# Patient Record
Sex: MALE | Race: WHITE | NOT HISPANIC OR LATINO | Employment: OTHER | URBAN - METROPOLITAN AREA
[De-identification: names, ages, dates, MRNs, and addresses within clinical notes are randomized per-mention and may not be internally consistent; named-entity substitution may affect disease eponyms.]

---

## 2017-03-24 DIAGNOSIS — N23 RENAL COLIC: Primary | ICD-10-CM

## 2017-03-24 NOTE — NURSING NOTE
Pt calling with elevated renal colic. He has an appointment for stone eval in May. Discussed with Dr Andrade. Orders for CT ab/pel w/o contrast ordered. Pt can be worked in next week. PENNIE Esquivel

## 2017-03-27 ENCOUNTER — HOSPITAL ENCOUNTER (OUTPATIENT)
Dept: CT IMAGING | Facility: CLINIC | Age: 62
Discharge: HOME | End: 2017-03-27
Attending: UROLOGY | Admitting: UROLOGY
Payer: COMMERCIAL

## 2017-03-27 DIAGNOSIS — N23 RENAL COLIC: ICD-10-CM

## 2017-03-27 PROCEDURE — 74176 CT ABD & PELVIS W/O CONTRAST: CPT

## 2017-03-28 DIAGNOSIS — N20.1 CALCULUS OF URETER: Primary | ICD-10-CM

## 2017-03-28 RX ORDER — TAMSULOSIN HYDROCHLORIDE 0.4 MG/1
0.4 CAPSULE ORAL DAILY
Qty: 30 CAPSULE | Refills: 1 | Status: SHIPPED | OUTPATIENT
Start: 2017-03-28 | End: 2017-05-30

## 2017-03-30 DIAGNOSIS — N20.0 KIDNEY STONE: Primary | ICD-10-CM

## 2017-03-30 PROCEDURE — 82365 CALCULUS SPECTROSCOPY: CPT | Mod: 90 | Performed by: UROLOGY

## 2017-03-30 PROCEDURE — 99000 SPECIMEN HANDLING OFFICE-LAB: CPT | Performed by: UROLOGY

## 2017-04-02 LAB
APPEARANCE STONE: NORMAL
COMPN STONE: NORMAL
NUMBER STONE: 1
SIZE STONE: NORMAL MM3
WT STONE: 152 MG

## 2017-05-10 DIAGNOSIS — N20.0 KIDNEY STONE: Primary | ICD-10-CM

## 2017-05-12 DIAGNOSIS — N20.0 KIDNEY STONE: Primary | ICD-10-CM

## 2017-05-26 DIAGNOSIS — N20.0 KIDNEY STONE: Primary | ICD-10-CM

## 2017-05-30 ENCOUNTER — HOSPITAL ENCOUNTER (OUTPATIENT)
Dept: GENERAL RADIOLOGY | Facility: CLINIC | Age: 62
Discharge: HOME | End: 2017-05-30
Attending: UROLOGY | Admitting: UROLOGY
Payer: COMMERCIAL

## 2017-05-30 ENCOUNTER — OFFICE VISIT (OUTPATIENT)
Dept: UROLOGY | Facility: CLINIC | Age: 62
End: 2017-05-30
Payer: COMMERCIAL

## 2017-05-30 VITALS
WEIGHT: 221 LBS | HEIGHT: 71 IN | BODY MASS INDEX: 30.94 KG/M2 | DIASTOLIC BLOOD PRESSURE: 90 MMHG | HEART RATE: 70 BPM | SYSTOLIC BLOOD PRESSURE: 160 MMHG

## 2017-05-30 DIAGNOSIS — N20.0 KIDNEY STONE: ICD-10-CM

## 2017-05-30 DIAGNOSIS — N20.0 CALCULUS OF KIDNEY: Primary | ICD-10-CM

## 2017-05-30 PROCEDURE — 99213 OFFICE O/P EST LOW 20 MIN: CPT | Performed by: UROLOGY

## 2017-05-30 PROCEDURE — 74010 XR KUB: CPT | Mod: 52

## 2017-05-30 RX ORDER — POTASSIUM CITRATE 10 MEQ/1
20 TABLET, EXTENDED RELEASE ORAL 2 TIMES DAILY WITH MEALS
Qty: 360 TABLET | Refills: 11 | Status: SHIPPED | OUTPATIENT
Start: 2017-05-30 | End: 2019-09-06

## 2017-05-30 ASSESSMENT — PAIN SCALES - GENERAL: PAINLEVEL: NO PAIN (0)

## 2017-05-30 NOTE — LETTER
"5/30/2017       RE: Magdaleno Marie  [address] [place] [address]     Dear Colleague,    Thank you for referring your patient, Magdaleno Marie, to the [facility] UROLOGY CLINIC [place] at [facility]. Please see a copy of my visit note below.    Office Visit Note  [facility]  [phone]    UROLOGIC DIAGNOSES:   History of stones    CURRENT INTERVENTIONS:       HISTORY:   This is a 61 year old gentleman who underwent a ureteroscopy to remove a stone in 2016. He called in March reporting recurrence of left sided flank pain so a CT scan was ordered, diagnosing him with a 6mm left ureteral stone. We make plans for followup but then 3 days later he passed that stone. This most recent stone consisted of uric acid. Previous stones contained calcium. He is here for KUB today and feels well.      PAST MEDICAL HISTORY:   Past Medical History:   Diagnosis Date     Calculus of kidney        PAST SURGICAL HISTORY:   Past Surgical History:   Procedure Laterality Date     COLONOSCOPY       COMBINED CYSTOSCOPY, RETROGRADES, URETEROSCOPY, LASER HOLMIUM LITHOTRIPSY URETER(S), INSERT STENT Left 7/28/2016    Procedure: COMBINED CYSTOSCOPY, RETROGRADES, URETEROSCOPY, LASER HOLMIUM LITHOTRIPSY URETER(S), INSERT STENT;  Surgeon: Arnel Andrade MD;  Location:  OR       FAMILY HISTORY: No family history on file.    SOCIAL HISTORY:   Social History   Substance Use Topics     Smoking status: Never Smoker     Smokeless tobacco: Never Used     Alcohol use Yes       Current Outpatient Prescriptions   Medication     potassium citrate (UROCIT-K) 10 MEQ (1080 MG) CR tablet     No current facility-administered medications for this visit.      PHYSICAL EXAM:  /90  Pulse 70  Ht 1.803 m (5' 11\")  Wt 100.2 kg (221 lb)  BMI 30.82 kg/m2    HEENT: Normocephalic and atraumatic   Cardiac: Not done  Back/Flank: Not done  CNS/PNS: Not " done  Respiratory: Normal non-labored breathing  Abdomen: Soft nontender and nondistended  Peripheral Vascular: Not done  Mental Status: Not done    Penis: Not done  Scrotal Skin: Not done  Testicles: Not done  Epididymis: Not done  Digital Rectal Exam:     Cystoscopy: Not done    Imaging: KUB today shows small stone in lower pole of right kidney    Urinalysis: UA RESULTS:  Recent Labs   Lab Test  05/28/16   1424   COLOR  Light Yellow   APPEARANCE  Clear   URINEGLC  Negative   URINEBILI  Negative   URINEKETONE  Negative   SG  1.003   UBLD  Moderate*   URINEPH  6.5   PROTEIN  Negative   NITRITE  Negative   LEUKEST  Negative   RBCU  26*   WBCU  <1       PSA:     Post Void Residual:     Other labs: None today      IMPRESSION:  Uric acid stones, right kidney stone    PLAN:  We discussed prevention methods for uric acid stones. I recommended starting KCitrate. This was prescribed for him. I will see him back in 6 months for repeat KUB and urine pH testing    Total Time: 15 min                                      Total in Consultation: 15 min      Arnel Andrade M.D.

## 2017-05-30 NOTE — MR AVS SNAPSHOT
After Visit Summary   5/30/2017    Magdaleno Marie    MRN: [id number]           Patient Information     Date Of Birth          1955        Visit Information        Provider Department      5/30/2017 11:00 AM Arnel Andrade MD [facility]        Today's Diagnoses     Calculus of kidney    -  1       Follow-ups after your visit        Follow-up notes from your care team     Return in about 6 months (around 11/30/2017) for UA, KUB same day.      Your next 10 appointments already scheduled     Dec 05, 2017 10:20 AM CST   XR KUB with SHXR3   [facility] ([facility])    [address] [id number] [phone]           Please bring a list of your current medicines to your exam. (Include vitamins, minerals and over-thecounter medicines.) Leave your valuables at home.  Tell your doctor if there is a chance you may be pregnant.  You do not need to do anything special for this exam.            Dec 05, 2017 11:00 AM CST   Return Visit with Arnel Andrade MD   [facility] ([facility])    [address]  [address]  [place] [id number] [phone]              Future tests that were ordered for you today     Open Future Orders        Priority Expected Expires Ordered    XR KUB Routine 5/30/2017 5/30/2018 5/30/2017            Who to contact     If you have questions or need follow up information about today's clinic visit or your schedule please contact [facility]OLOGY [facility] directly at [phone].  Normal or non-critical lab and imaging results will be communicated to you by MyChart, letter or phone within 4 business days after the clinic has received the results. If you do not hear from us within 7 days, please contact the clinic through MyChart or phone. If you have a critical or abnormal lab result, we  "will notify you by phone as soon as possible.  Submit refill requests through [employer] or call your pharmacy and they will forward the refill request to us. Please allow 3 business days for your refill to be completed.          Additional Information About Your Visit        [employer] Information     [employer] gives you secure access to your electronic health record. If you see a primary care provider, you can also send messages to your care team and make appointments. If you have questions, please call your primary care clinic.  If you do not have a primary care provider, please call [phone] and they will assist you.        Care EveryWhere ID     This is your Care EveryWhere ID. This could be used by other organizations to access your [facility] medical records  [id number]        Your Vitals Were     Pulse Height BMI (Body Mass Index)             70 1.803 m (5' 11\") 30.82 kg/m2          Blood Pressure from Last 3 Encounters:   05/30/17 160/90   07/28/16 (!) 154/94   05/28/16 (!) 183/105    Weight from Last 3 Encounters:   05/30/17 100.2 kg (221 lb)   07/28/16 105.9 kg (233 lb 8 oz)   05/28/16 99.8 kg (220 lb)                 Today's Medication Changes          These changes are accurate as of: 5/30/17 11:36 AM.  If you have any questions, ask your nurse or doctor.               Start taking these medicines.        Dose/Directions    potassium citrate 10 MEQ (1080 MG) CR tablet   Commonly known as:  UROCIT-K   Used for:  Calculus of kidney   Started by:  Arnel Andrade MD        Dose:  20 mEq   Take 2 tablets (20 mEq) by mouth 2 times daily (with meals)   Quantity:  360 tablet   Refills:  11         Stop taking these medicines if you haven't already. Please contact your care team if you have questions.     tamsulosin 0.4 MG capsule   Commonly known as:  FLOMAX   Stopped by:  Arnel Andrade MD                Where to get your medicines      These medications were sent to [id number] - " [place], [place] - [address] AT [facility] OF  [address]  [address], [place] [address]     Phone:  [phone]     potassium citrate 10 MEQ (1080 MG) CR tablet                Primary Care Provider Office Phone # Fax #    [facility] [phone] [address] [address]        Thank you!     Thank you for choosing [facility]OLOG[facility] [place]  for your care. Our goal is always to provide you with excellent care. Hearing back from our patients is one way we can continue to improve our services. Please take a few minutes to complete the written survey that you may receive in the mail after your visit with us. Thank you!             Your Updated Medication List - Protect others around you: Learn how to safely use, store and throw away your medicines at www.disposemymeds.org.          This list is accurate as of: 5/30/17 11:36 AM.  Always use your most recent med list.                   Brand Name Dispense Instructions for use    potassium citrate 10 MEQ (1080 MG) CR tablet    UROCIT-K    360 tablet    Take 2 tablets (20 mEq) by mouth 2 times daily (with meals)

## 2017-05-30 NOTE — PROGRESS NOTES
"Office Visit Note  [facility]  [phone]    UROLOGIC DIAGNOSES:   History of stones    CURRENT INTERVENTIONS:       HISTORY:   This is a 61 year old gentleman who underwent a ureteroscopy to remove a stone in 2016. He called in March reporting recurrence of left sided flank pain so a CT scan was ordered, diagnosing him with a 6mm left ureteral stone. We make plans for followup but then 3 days later he passed that stone. This most recent stone consisted of uric acid. Previous stones contained calcium. He is here for KUB today and feels well.      PAST MEDICAL HISTORY:   Past Medical History:   Diagnosis Date     Calculus of kidney        PAST SURGICAL HISTORY:   Past Surgical History:   Procedure Laterality Date     COLONOSCOPY       COMBINED CYSTOSCOPY, RETROGRADES, URETEROSCOPY, LASER HOLMIUM LITHOTRIPSY URETER(S), INSERT STENT Left 7/28/2016    Procedure: COMBINED CYSTOSCOPY, RETROGRADES, URETEROSCOPY, LASER HOLMIUM LITHOTRIPSY URETER(S), INSERT STENT;  Surgeon: Arnel Andrade MD;  Location:  OR       FAMILY HISTORY: No family history on file.    SOCIAL HISTORY:   Social History   Substance Use Topics     Smoking status: Never Smoker     Smokeless tobacco: Never Used     Alcohol use Yes       Current Outpatient Prescriptions   Medication     potassium citrate (UROCIT-K) 10 MEQ (1080 MG) CR tablet     No current facility-administered medications for this visit.          PHYSICAL EXAM:    /90  Pulse 70  Ht 1.803 m (5' 11\")  Wt 100.2 kg (221 lb)  BMI 30.82 kg/m2    HEENT: Normocephalic and atraumatic   Cardiac: Not done  Back/Flank: Not done  CNS/PNS: Not done  Respiratory: Normal non-labored breathing  Abdomen: Soft nontender and nondistended  Peripheral Vascular: Not done  Mental Status: Not done    Penis: Not done  Scrotal Skin: Not done  Testicles: Not done  Epididymis: Not done  Digital Rectal Exam:     Cystoscopy: Not done    Imaging: KUB today shows small stone in lower " pole of right kidney    Urinalysis: UA RESULTS:  Recent Labs   Lab Test  05/28/16   1424   COLOR  Light Yellow   APPEARANCE  Clear   URINEGLC  Negative   URINEBILI  Negative   URINEKETONE  Negative   SG  1.003   UBLD  Moderate*   URINEPH  6.5   PROTEIN  Negative   NITRITE  Negative   LEUKEST  Negative   RBCU  26*   WBCU  <1       PSA:     Post Void Residual:     Other labs: None today      IMPRESSION:  Uric acid stones, right kidney stone    PLAN:  We discussed prevention methods for uric acid stones. I recommended starting KCitrate. This was prescribed for him. I will see him back in 6 months for repeat KUB and urine pH testing    Total Time: 15 min                                      Total in Consultation: 15 min      Arnel Andrade M.D.

## 2017-05-30 NOTE — NURSING NOTE
Chief Complaint   Patient presents with     Results     KUB results     Mimi Mackey LPN 11:14 AM May 30, 2017

## 2017-12-05 DIAGNOSIS — N20.0 CALCULUS OF KIDNEY: Primary | ICD-10-CM

## 2019-08-26 ENCOUNTER — HOSPITAL ENCOUNTER (OUTPATIENT)
Dept: GENERAL RADIOLOGY | Facility: CLINIC | Age: 64
Discharge: HOME | End: 2019-08-26
Attending: UROLOGY | Admitting: UROLOGY
Payer: COMMERCIAL

## 2019-08-26 ENCOUNTER — TELEPHONE (OUTPATIENT)
Dept: UROLOGY | Facility: CLINIC | Age: 64
End: 2019-08-26

## 2019-08-26 DIAGNOSIS — R10.9 LT FLANK PAIN: Primary | ICD-10-CM

## 2019-08-26 DIAGNOSIS — R10.9 LT FLANK PAIN: ICD-10-CM

## 2019-08-26 PROCEDURE — 74019 RADEX ABDOMEN 2 VIEWS: CPT

## 2019-08-26 NOTE — TELEPHONE ENCOUNTER
Per last office visit. KUB order was placed. Patient will go and have this done. He is planning on traveling out of town in the near future. If there is a stone that is a good size, patient may cancel plans and will schedule follow-up with Md.     Judy Barrow LPN

## 2019-08-26 NOTE — TELEPHONE ENCOUNTER
[facility] Call Center    Phone Message    May a detailed message be left on voicemail: yes    Reason for Call: Other: Patient called has seen Raymond in the past for kidneystones, patient thinks he has them again and would like to see if Dr. Andrade can order a Xray? Please call to inform what he need to do.     Action Taken: Other: p urology

## 2019-08-27 ENCOUNTER — TELEPHONE (OUTPATIENT)
Dept: UROLOGY | Facility: CLINIC | Age: 64
End: 2019-08-27

## 2019-08-27 DIAGNOSIS — N20.0 CALCULUS OF KIDNEY: Primary | ICD-10-CM

## 2019-08-27 NOTE — TELEPHONE ENCOUNTER
Called patient and informed him that he should make an appt. to see MD regarding results. He is aware he has a stone. Patient was transferred to scheduling to make appt.     Judy Barrow LPN

## 2019-08-27 NOTE — TELEPHONE ENCOUNTER
Patient reports to  that he is in pretty severe pain and taking Oxycodone every 6 hours and tamsulosin. Patient wants to have surgery right away to remove stone. Patient's MD is currently out of the office. Patient was instructed if pain is severe to go to ER. Otherwise, if pain is tolerable he can make appt. with MD.     Judy Barrow LPN

## 2019-08-27 NOTE — TELEPHONE ENCOUNTER
[facility] Call Center    Phone Message    May a detailed message be left on voicemail: yes    Reason for Call: Requesting Results   Name/type of test: xray  Date of test: 8/26/19  Was test done at a location other than [facility] (Please fill in the location if not [facility])?: No      Action Taken: Message routed to:  Other: UA Uro

## 2019-09-05 ENCOUNTER — TELEPHONE (OUTPATIENT)
Dept: SURGERY | Facility: CLINIC | Age: 64
End: 2019-09-05

## 2019-09-05 DIAGNOSIS — N20.0 KIDNEY STONE: Primary | ICD-10-CM

## 2019-09-05 NOTE — TELEPHONE ENCOUNTER
Spoke on phone re: KUB results  Pain is on left side but stone is on right  He has had uric acid stones previously so I recommended CT scan tomorrow

## 2019-09-06 ENCOUNTER — HOSPITAL ENCOUNTER (OUTPATIENT)
Dept: CT IMAGING | Facility: CLINIC | Age: 64
Discharge: HOME | End: 2019-09-06
Attending: UROLOGY | Admitting: UROLOGY
Payer: COMMERCIAL

## 2019-09-06 DIAGNOSIS — N20.0 KIDNEY STONE: ICD-10-CM

## 2019-09-06 PROCEDURE — 74176 CT ABD & PELVIS W/O CONTRAST: CPT

## 2019-09-06 RX ORDER — IBUPROFEN 600 MG/1
600 TABLET, FILM COATED ORAL EVERY 6 HOURS PRN
COMMUNITY
End: 2021-09-20

## 2019-09-06 RX ORDER — LISINOPRIL 10 MG/1
10 TABLET ORAL DAILY
COMMUNITY
End: 2023-10-26 | Stop reason: DRUGHIGH

## 2019-09-06 RX ORDER — OXYBUTYNIN CHLORIDE 5 MG/1
5 TABLET ORAL 3 TIMES DAILY PRN
COMMUNITY
End: 2021-09-20

## 2019-09-06 ASSESSMENT — MIFFLIN-ST. JEOR: SCORE: 1818.65

## 2019-09-09 ENCOUNTER — APPOINTMENT (OUTPATIENT)
Dept: GENERAL RADIOLOGY | Facility: CLINIC | Age: 64
End: 2019-09-09
Attending: UROLOGY
Payer: COMMERCIAL

## 2019-09-09 ENCOUNTER — ANESTHESIA (OUTPATIENT)
Dept: SURGERY | Facility: CLINIC | Age: 64
End: 2019-09-09
Payer: COMMERCIAL

## 2019-09-09 ENCOUNTER — ANESTHESIA EVENT (OUTPATIENT)
Dept: SURGERY | Facility: CLINIC | Age: 64
End: 2019-09-09
Payer: COMMERCIAL

## 2019-09-09 ENCOUNTER — HOSPITAL ENCOUNTER (OUTPATIENT)
Facility: CLINIC | Age: 64
Discharge: HOME | End: 2019-09-09
Attending: UROLOGY | Admitting: UROLOGY
Payer: COMMERCIAL

## 2019-09-09 VITALS
SYSTOLIC BLOOD PRESSURE: 160 MMHG | HEART RATE: 70 BPM | OXYGEN SATURATION: 99 % | TEMPERATURE: 98.6 F | DIASTOLIC BLOOD PRESSURE: 89 MMHG | RESPIRATION RATE: 16 BRPM | WEIGHT: 221 LBS | BODY MASS INDEX: 30.94 KG/M2 | HEIGHT: 71 IN

## 2019-09-09 DIAGNOSIS — N20.0 CALCULUS OF KIDNEY: Primary | ICD-10-CM

## 2019-09-09 DIAGNOSIS — N20.0 KIDNEY STONE: Primary | ICD-10-CM

## 2019-09-09 LAB
COPATH REPORT: NORMAL
CREAT SERPL-MCNC: 1.65 MG/DL (ref 0.66–1.25)
GFR SERPL CREATININE-BSD FRML MDRD: 43 ML/MIN/{1.73_M2}
INTERPRETATION ECG - MUSE: NORMAL
POTASSIUM SERPL-SCNC: 4.1 MMOL/L (ref 3.4–5.3)

## 2019-09-09 PROCEDURE — 93010 ELECTROCARDIOGRAM REPORT: CPT | Performed by: INTERNAL MEDICINE

## 2019-09-09 PROCEDURE — 25800030 ZZH RX IP 258 OP 636: Performed by: ANESTHESIOLOGY

## 2019-09-09 PROCEDURE — 36000058 ZZH SURGERY LEVEL 3 EA 15 ADDTL MIN: Performed by: UROLOGY

## 2019-09-09 PROCEDURE — 52356 CYSTO/URETERO W/LITHOTRIPSY: CPT | Mod: 50 | Performed by: UROLOGY

## 2019-09-09 PROCEDURE — 37000008 ZZH ANESTHESIA TECHNICAL FEE, 1ST 30 MIN: Performed by: UROLOGY

## 2019-09-09 PROCEDURE — 88300 SURGICAL PATH GROSS: CPT | Mod: 26 | Performed by: UROLOGY

## 2019-09-09 PROCEDURE — 36000060 ZZH SURGERY LEVEL 3 W FLUORO 1ST 30 MIN: Performed by: UROLOGY

## 2019-09-09 PROCEDURE — 37000009 ZZH ANESTHESIA TECHNICAL FEE, EACH ADDTL 15 MIN: Performed by: UROLOGY

## 2019-09-09 PROCEDURE — 40000306 ZZH STATISTIC PRE PROC ASSESS II: Performed by: UROLOGY

## 2019-09-09 PROCEDURE — C2617 STENT, NON-COR, TEM W/O DEL: HCPCS | Performed by: UROLOGY

## 2019-09-09 PROCEDURE — 25000125 ZZHC RX 250: Performed by: ANESTHESIOLOGY

## 2019-09-09 PROCEDURE — 25000132 ZZH RX MED GY IP 250 OP 250 PS 637: Performed by: UROLOGY

## 2019-09-09 PROCEDURE — 88300 SURGICAL PATH GROSS: CPT | Performed by: UROLOGY

## 2019-09-09 PROCEDURE — 71000013 ZZH RECOVERY PHASE 1 LEVEL 1 EA ADDTL HR: Performed by: UROLOGY

## 2019-09-09 PROCEDURE — 71000027 ZZH RECOVERY PHASE 2 EACH 15 MINS: Performed by: UROLOGY

## 2019-09-09 PROCEDURE — 71000012 ZZH RECOVERY PHASE 1 LEVEL 1 FIRST HR: Performed by: UROLOGY

## 2019-09-09 PROCEDURE — 25000128 H RX IP 250 OP 636: Performed by: UROLOGY

## 2019-09-09 PROCEDURE — 25000128 H RX IP 250 OP 636: Performed by: NURSE ANESTHETIST, CERTIFIED REGISTERED

## 2019-09-09 PROCEDURE — 82565 ASSAY OF CREATININE: CPT | Performed by: ANESTHESIOLOGY

## 2019-09-09 PROCEDURE — 27211024 ZZHC OR SUPPLY OTHER OPNP: Performed by: UROLOGY

## 2019-09-09 PROCEDURE — 74420 UROGRAPHY RTRGR +-KUB: CPT | Mod: 26 | Performed by: UROLOGY

## 2019-09-09 PROCEDURE — 25500064 ZZH RX 255 OP 636: Performed by: UROLOGY

## 2019-09-09 PROCEDURE — 25000125 ZZHC RX 250: Performed by: NURSE ANESTHETIST, CERTIFIED REGISTERED

## 2019-09-09 PROCEDURE — 36415 COLL VENOUS BLD VENIPUNCTURE: CPT | Performed by: ANESTHESIOLOGY

## 2019-09-09 PROCEDURE — 40000277 XR SURGERY CARM FLUORO LESS THAN 5 MIN W STILLS: Mod: TC

## 2019-09-09 PROCEDURE — 25000125 ZZHC RX 250: Performed by: UROLOGY

## 2019-09-09 PROCEDURE — 25000132 ZZH RX MED GY IP 250 OP 250 PS 637: Performed by: ANESTHESIOLOGY

## 2019-09-09 PROCEDURE — 84132 ASSAY OF SERUM POTASSIUM: CPT | Performed by: ANESTHESIOLOGY

## 2019-09-09 PROCEDURE — C1894 INTRO/SHEATH, NON-LASER: HCPCS | Performed by: UROLOGY

## 2019-09-09 PROCEDURE — 82365 CALCULUS SPECTROSCOPY: CPT | Performed by: UROLOGY

## 2019-09-09 PROCEDURE — C1769 GUIDE WIRE: HCPCS | Performed by: UROLOGY

## 2019-09-09 PROCEDURE — 25000128 H RX IP 250 OP 636: Performed by: ANESTHESIOLOGY

## 2019-09-09 PROCEDURE — 27210794 ZZH OR GENERAL SUPPLY STERILE: Performed by: UROLOGY

## 2019-09-09 DEVICE — STENT URETERAL POLARIS ULTRA 5FRX26CM M0061921230: Type: IMPLANTABLE DEVICE | Site: URETER | Status: FUNCTIONAL

## 2019-09-09 RX ORDER — DEXAMETHASONE SODIUM PHOSPHATE 4 MG/ML
INJECTION, SOLUTION INTRA-ARTICULAR; INTRALESIONAL; INTRAMUSCULAR; INTRAVENOUS; SOFT TISSUE PRN
Status: DISCONTINUED | OUTPATIENT
Start: 2019-09-09 | End: 2019-09-09

## 2019-09-09 RX ORDER — ACETAMINOPHEN 325 MG/1
975 TABLET ORAL ONCE
Status: COMPLETED | OUTPATIENT
Start: 2019-09-09 | End: 2019-09-09

## 2019-09-09 RX ORDER — PROPOFOL 10 MG/ML
INJECTION, EMULSION INTRAVENOUS PRN
Status: DISCONTINUED | OUTPATIENT
Start: 2019-09-09 | End: 2019-09-09

## 2019-09-09 RX ORDER — CEFAZOLIN SODIUM 1 G/3ML
1 INJECTION, POWDER, FOR SOLUTION INTRAMUSCULAR; INTRAVENOUS SEE ADMIN INSTRUCTIONS
Status: DISCONTINUED | OUTPATIENT
Start: 2019-09-09 | End: 2019-09-09 | Stop reason: HOSPADM

## 2019-09-09 RX ORDER — PHENYLEPHRINE HYDROCHLORIDE 10 MG/ML
INJECTION INTRAVENOUS PRN
Status: DISCONTINUED | OUTPATIENT
Start: 2019-09-09 | End: 2019-09-09

## 2019-09-09 RX ORDER — ATROPA BELLADONNA AND OPIUM 16.2; 3 MG/1; MG/1
SUPPOSITORY RECTAL PRN
Status: DISCONTINUED | OUTPATIENT
Start: 2019-09-09 | End: 2019-09-09 | Stop reason: HOSPADM

## 2019-09-09 RX ORDER — GLYCOPYRROLATE 0.2 MG/ML
INJECTION, SOLUTION INTRAMUSCULAR; INTRAVENOUS PRN
Status: DISCONTINUED | OUTPATIENT
Start: 2019-09-09 | End: 2019-09-09

## 2019-09-09 RX ORDER — FENTANYL CITRATE 50 UG/ML
INJECTION, SOLUTION INTRAMUSCULAR; INTRAVENOUS PRN
Status: DISCONTINUED | OUTPATIENT
Start: 2019-09-09 | End: 2019-09-09

## 2019-09-09 RX ORDER — NALOXONE HYDROCHLORIDE 0.4 MG/ML
.1-.4 INJECTION, SOLUTION INTRAMUSCULAR; INTRAVENOUS; SUBCUTANEOUS
Status: DISCONTINUED | OUTPATIENT
Start: 2019-09-09 | End: 2019-09-09 | Stop reason: HOSPADM

## 2019-09-09 RX ORDER — FENTANYL CITRATE 50 UG/ML
25-50 INJECTION, SOLUTION INTRAMUSCULAR; INTRAVENOUS EVERY 5 MIN PRN
Status: DISCONTINUED | OUTPATIENT
Start: 2019-09-09 | End: 2019-09-09 | Stop reason: HOSPADM

## 2019-09-09 RX ORDER — SODIUM CHLORIDE, SODIUM LACTATE, POTASSIUM CHLORIDE, CALCIUM CHLORIDE 600; 310; 30; 20 MG/100ML; MG/100ML; MG/100ML; MG/100ML
INJECTION, SOLUTION INTRAVENOUS CONTINUOUS
Status: DISCONTINUED | OUTPATIENT
Start: 2019-09-09 | End: 2019-09-09 | Stop reason: HOSPADM

## 2019-09-09 RX ORDER — HYDROMORPHONE HYDROCHLORIDE 1 MG/ML
.3-.5 INJECTION, SOLUTION INTRAMUSCULAR; INTRAVENOUS; SUBCUTANEOUS EVERY 10 MIN PRN
Status: DISCONTINUED | OUTPATIENT
Start: 2019-09-09 | End: 2019-09-09 | Stop reason: HOSPADM

## 2019-09-09 RX ORDER — ONDANSETRON 4 MG/1
4 TABLET, ORALLY DISINTEGRATING ORAL EVERY 30 MIN PRN
Status: DISCONTINUED | OUTPATIENT
Start: 2019-09-09 | End: 2019-09-09 | Stop reason: HOSPADM

## 2019-09-09 RX ORDER — ONDANSETRON 2 MG/ML
INJECTION INTRAMUSCULAR; INTRAVENOUS PRN
Status: DISCONTINUED | OUTPATIENT
Start: 2019-09-09 | End: 2019-09-09

## 2019-09-09 RX ORDER — KETOROLAC TROMETHAMINE 10 MG/1
10 TABLET, FILM COATED ORAL EVERY 6 HOURS PRN
Qty: 10 TABLET | Refills: 1 | Status: SHIPPED | OUTPATIENT
Start: 2019-09-09 | End: 2021-09-20

## 2019-09-09 RX ORDER — FENTANYL CITRATE 50 UG/ML
25-50 INJECTION, SOLUTION INTRAMUSCULAR; INTRAVENOUS
Status: DISCONTINUED | OUTPATIENT
Start: 2019-09-09 | End: 2019-09-09 | Stop reason: HOSPADM

## 2019-09-09 RX ORDER — KETOROLAC TROMETHAMINE 30 MG/ML
30 INJECTION, SOLUTION INTRAMUSCULAR; INTRAVENOUS ONCE
Status: COMPLETED | OUTPATIENT
Start: 2019-09-09 | End: 2019-09-09

## 2019-09-09 RX ORDER — HYDROCODONE BITARTRATE AND ACETAMINOPHEN 5; 325 MG/1; MG/1
1-2 TABLET ORAL EVERY 4 HOURS PRN
Status: DISCONTINUED | OUTPATIENT
Start: 2019-09-09 | End: 2019-09-09 | Stop reason: HOSPADM

## 2019-09-09 RX ORDER — OXYBUTYNIN CHLORIDE 5 MG/1
5 TABLET ORAL 3 TIMES DAILY PRN
Qty: 10 TABLET | Refills: 1 | Status: SHIPPED | OUTPATIENT
Start: 2019-09-09 | End: 2020-06-11

## 2019-09-09 RX ORDER — HYDROCODONE BITARTRATE AND ACETAMINOPHEN 5; 325 MG/1; MG/1
1-2 TABLET ORAL EVERY 4 HOURS PRN
Qty: 5 TABLET | Refills: 0 | Status: SHIPPED | OUTPATIENT
Start: 2019-09-09 | End: 2021-09-20

## 2019-09-09 RX ORDER — LIDOCAINE 40 MG/G
CREAM TOPICAL
Status: DISCONTINUED | OUTPATIENT
Start: 2019-09-09 | End: 2019-09-09 | Stop reason: HOSPADM

## 2019-09-09 RX ORDER — CEFAZOLIN SODIUM 2 G/100ML
2 INJECTION, SOLUTION INTRAVENOUS
Status: COMPLETED | OUTPATIENT
Start: 2019-09-09 | End: 2019-09-09

## 2019-09-09 RX ORDER — MEPERIDINE HYDROCHLORIDE 50 MG/ML
12.5 INJECTION INTRAMUSCULAR; INTRAVENOUS; SUBCUTANEOUS
Status: DISCONTINUED | OUTPATIENT
Start: 2019-09-09 | End: 2019-09-09 | Stop reason: HOSPADM

## 2019-09-09 RX ORDER — ONDANSETRON 2 MG/ML
4 INJECTION INTRAMUSCULAR; INTRAVENOUS EVERY 30 MIN PRN
Status: DISCONTINUED | OUTPATIENT
Start: 2019-09-09 | End: 2019-09-09 | Stop reason: HOSPADM

## 2019-09-09 RX ADMIN — ACETAMINOPHEN 975 MG: 325 TABLET, FILM COATED ORAL at 10:54

## 2019-09-09 RX ADMIN — PROPOFOL 200 MG: 10 INJECTION, EMULSION INTRAVENOUS at 12:05

## 2019-09-09 RX ADMIN — FENTANYL CITRATE 50 MCG: 50 INJECTION INTRAMUSCULAR; INTRAVENOUS at 14:23

## 2019-09-09 RX ADMIN — FENTANYL CITRATE 50 MCG: 50 INJECTION INTRAMUSCULAR; INTRAVENOUS at 14:09

## 2019-09-09 RX ADMIN — PHENYLEPHRINE HYDROCHLORIDE 100 MCG: 10 INJECTION INTRAVENOUS at 12:56

## 2019-09-09 RX ADMIN — FENTANYL CITRATE 50 MCG: 50 INJECTION INTRAMUSCULAR; INTRAVENOUS at 13:55

## 2019-09-09 RX ADMIN — FENTANYL CITRATE 100 MCG: 50 INJECTION, SOLUTION INTRAMUSCULAR; INTRAVENOUS at 12:22

## 2019-09-09 RX ADMIN — HYDROCODONE BITARTRATE AND ACETAMINOPHEN 1 TABLET: 5; 325 TABLET ORAL at 14:38

## 2019-09-09 RX ADMIN — LIDOCAINE HYDROCHLORIDE 50 MG: 10 INJECTION, SOLUTION EPIDURAL; INFILTRATION; INTRACAUDAL; PERINEURAL at 12:05

## 2019-09-09 RX ADMIN — MIDAZOLAM 2 MG: 1 INJECTION INTRAMUSCULAR; INTRAVENOUS at 12:00

## 2019-09-09 RX ADMIN — PHENYLEPHRINE HYDROCHLORIDE 100 MCG: 10 INJECTION INTRAVENOUS at 13:13

## 2019-09-09 RX ADMIN — SODIUM CHLORIDE, POTASSIUM CHLORIDE, SODIUM LACTATE AND CALCIUM CHLORIDE: 600; 310; 30; 20 INJECTION, SOLUTION INTRAVENOUS at 14:04

## 2019-09-09 RX ADMIN — ONDANSETRON HYDROCHLORIDE 4 MG: 2 INJECTION, SOLUTION INTRAVENOUS at 12:21

## 2019-09-09 RX ADMIN — PHENYLEPHRINE HYDROCHLORIDE 150 MCG: 10 INJECTION INTRAVENOUS at 12:18

## 2019-09-09 RX ADMIN — FENTANYL CITRATE 50 MCG: 50 INJECTION, SOLUTION INTRAMUSCULAR; INTRAVENOUS at 12:49

## 2019-09-09 RX ADMIN — CEFAZOLIN SODIUM 2 G: 2 INJECTION, SOLUTION INTRAVENOUS at 12:00

## 2019-09-09 RX ADMIN — DEXAMETHASONE SODIUM PHOSPHATE 4 MG: 4 INJECTION, SOLUTION INTRA-ARTICULAR; INTRALESIONAL; INTRAMUSCULAR; INTRAVENOUS; SOFT TISSUE at 12:06

## 2019-09-09 RX ADMIN — FENTANYL CITRATE 50 MCG: 50 INJECTION INTRAMUSCULAR; INTRAVENOUS at 14:01

## 2019-09-09 RX ADMIN — SODIUM CHLORIDE, POTASSIUM CHLORIDE, SODIUM LACTATE AND CALCIUM CHLORIDE: 600; 310; 30; 20 INJECTION, SOLUTION INTRAVENOUS at 11:01

## 2019-09-09 RX ADMIN — KETOROLAC TROMETHAMINE 30 MG: 30 INJECTION, SOLUTION INTRAMUSCULAR at 16:50

## 2019-09-09 RX ADMIN — FENTANYL CITRATE 100 MCG: 50 INJECTION, SOLUTION INTRAMUSCULAR; INTRAVENOUS at 12:05

## 2019-09-09 RX ADMIN — HYDROMORPHONE HYDROCHLORIDE 0.5 MG: 10 INJECTION, SOLUTION INTRAMUSCULAR; INTRAVENOUS; SUBCUTANEOUS at 14:33

## 2019-09-09 RX ADMIN — GLYCOPYRROLATE 0.1 MG: 0.2 INJECTION, SOLUTION INTRAMUSCULAR; INTRAVENOUS at 12:50

## 2019-09-09 RX ADMIN — FENTANYL CITRATE 50 MCG: 50 INJECTION, SOLUTION INTRAMUSCULAR; INTRAVENOUS at 15:15

## 2019-09-09 RX ADMIN — HYDROMORPHONE HYDROCHLORIDE 0.5 MG: 10 INJECTION, SOLUTION INTRAMUSCULAR; INTRAVENOUS; SUBCUTANEOUS at 14:12

## 2019-09-09 ASSESSMENT — ENCOUNTER SYMPTOMS
SEIZURES: 0
DYSRHYTHMIAS: 0

## 2019-09-09 ASSESSMENT — MIFFLIN-ST. JEOR: SCORE: 1809.32

## 2019-09-09 NOTE — DISCHARGE INSTRUCTIONS
CYSTOSCOPY DISCHARGE INSTRUCTIONS  [facility] UROLOGY  [address]  [phone]    YOU MAY GO BACK TO YOUR NORMAL DIET AND ACTIVITY, UNLESS YOUR DOCTOR TELLS YOU NOT TO.    FOR THE NEXT TWO DAYS, YOU MAY NOTICE:    SOME BLOOD IN YOUR URINE.  SOME BURNING WHEN YOU URINATE.  AN URGE TO URINATE MORE OFTEN.  BLADDER SPASMS.    THESE ARE NORMAL AFTER THE PROCEDURE.  THEY SHOULD GO AWAY AFTER A DAY OR TWO.  TO RELIEVE THESE PROBLEMS:     DRINK 6 TO 8 LARGE GLASSES OF WATER EACH DAY (INCLUDES DRINKS AT MEALS).  THIS WILL HELP CLEAR THE URINE.    TAKE WARM BATHS TO RELIEVE PAIN AND BLADDER SPASMS.  DO NOT ADD ANYTHING TO THE BATH WATER.    YOUR DOCTOR MAY PRESCRIBE PAIN MEDICINE.  YOU MAY ALSO TAKE TYLENOL (ACETAMINOPHEN) FOR PAIN.    CALL YOUR SURGEON IF YOU HAVE:    A FEVER OVER 101 DEGREES.  CHECK YOUR TEMPERATURE UNDER YOUR TONGUE.    CHILLS.    FAILURE TO URINATE (NO URINE COMES OUT WHEN YOU TRY TO USE THE TOILET).  TRY SOAKING IN A BATHTUB FULL OF WARM WATER.  IF STILL NO URINE, CALL YOUR DOCTOR.    A LOT OF BLOOD IN THE URINE, OR BLOOD CLOTS LARGER THAN A NICKEL.      PAIN IN THE BACK OR BELLY AREA (ABDOMEN).    PAIN OR SPASMS THAT ARE NOT RELIEVED BY WARM TUB BATHS AND PAIN MEDICINE.      SEVERE PAIN, BURNING OR OTHER PROBLEMS WHILE PASSING URINE.    PAIN THAT GETS WORSE AFTER TWO DAYS.                                                                   STENT INFORMATION/DISCHARGE INSTRUCTIONS   [facility]  [address]  [phone]    During surgery, a stent may be placed in the ureter.  The ureter is the tube that drains urine from the kidney to the bladder.  The stent is placed to dilate (open) the ureter so stone fragments can pass easily through the ureter or to decrease ureteral swelling after surgery or to relieve an obstruction.      The stent is made of silicone.  The upper end of the stent curls in the kidney while the lower end rests in the  bladder.    While the stent is in place you may experience the following symptoms:  Blood and/or small blood clots in the urine  Bladder spasms (frequency and urgency of urination)  Discomfort or aching in the back or side where the stent is  Burning or discomfort at the end of urine stream    To decrease these symptoms you should:  Take antispasmodic medication as prescribed (Detrol, Ditropan, etc.)  Drink plenty of fluids but avoid caffeine and citrus (include cranberry)  If you are having discomfort in back or side, decrease activity    Please call your physician or the physician on call if you experience:  Fever greater than 101 degrees  Severe pain not relieved by pain medication or rest    Please make an appointment for the removal of the stent according to your physician's instructions.               GENERAL ANESTHESIA OR SEDATION ADULT DISCHARGE INSTRUCTIONS   SPECIAL PRECAUTIONS FOR 24 HOURS AFTER SURGERY    IT IS NOT UNUSUAL TO FEEL LIGHT-HEADED OR FAINT, UP TO 24 HOURS AFTER SURGERY OR WHILE TAKING PAIN MEDICATION.  IF YOU HAVE THESE SYMPTOMS; SIT FOR A FEW MINUTES BEFORE STANDING AND HAVE SOMEONE ASSIST YOU WHEN YOU GET UP TO WALK OR USE THE BATHROOM.    YOU SHOULD REST AND RELAX FOR THE NEXT 24 HOURS AND YOU MUST MAKE ARRANGEMENTS TO HAVE SOMEONE STAY WITH YOU FOR AT LEAST 24 HOURS AFTER YOUR DISCHARGE.  AVOID HAZARDOUS AND STRENUOUS ACTIVITIES.  DO NOT MAKE IMPORTANT DECISIONS FOR 24 HOURS.    DO NOT DRIVE ANY VEHICLE OR OPERATE MECHANICAL EQUIPMENT FOR 24 HOURS FOLLOWING THE END OF YOUR SURGERY.  EVEN THOUGH YOU MAY FEEL NORMAL, YOUR REACTIONS MAY BE AFFECTED BY THE MEDICATION YOU HAVE RECEIVED.    DO NOT DRINK ALCOHOLIC BEVERAGES FOR 24 HOURS FOLLOWING YOUR SURGERY.    DRINK CLEAR LIQUIDS (APPLE JUICE, GINGER ALE, 7-UP, BROTH, ETC.).  PROGRESS TO YOUR REGULAR DIET AS YOU FEEL ABLE.    YOU MAY HAVE A DRY MOUTH, A SORE THROAT, MUSCLES ACHES OR TROUBLE SLEEPING.  THESE SHOULD GO AWAY AFTER 24  HOURS.    CALL YOUR DOCTOR FOR ANY OF THE FOLLOWING:  SIGNS OF INFECTION (FEVER, GROWING TENDERNESS AT THE SURGERY SITE, A LARGE AMOUNT OF DRAINAGE OR BLEEDING, SEVERE PAIN, FOUL-SMELLING DRAINAGE, REDNESS OR SWELLING.    IT HAS BEEN OVER 8 TO 10 HOURS SINCE SURGERY AND YOU ARE STILL NOT ABLE TO URINATE (PASS WATER).

## 2019-09-09 NOTE — ANESTHESIA POSTPROCEDURE EVALUATION
Patient: Magdaleno Tellezter    Procedure(s):  CYSTOURETEROSCOPY, WITH Bilateral RETROGRADE PYELOGRAM, HOLMIUM LASER LITHOTRIPSY OF URETERAL CALCULUS, AND STENT INSERTION    Diagnosis:stone  Diagnosis Additional Information: No value filed.    Anesthesia Type:  General, LMA    Note:  Anesthesia Post Evaluation    Patient location during evaluation: PACU  Patient participation: Able to fully participate in evaluation  Level of consciousness: awake and alert  Pain management: adequate  Airway patency: patent  Cardiovascular status: acceptable  Respiratory status: acceptable  Hydration status: acceptable  PONV: controlled             Last vitals:  Vitals:    09/09/19 1330 09/09/19 1335 09/09/19 1340   BP: 130/76 105/74 115/80   Pulse: 64 73 67   Resp: 18 9 12   Temp: 97  F (36.1  C)     SpO2: 99% 96% 95%         Electronically Signed By: Frantz Barrow MD  September 9, 2019  1:59 PM

## 2019-09-09 NOTE — OR NURSING
Pt complaining of pain in lower R groin. States pain is 6-7/10.  Feels pulsating pain where he feels the stent is. Has already voided post surgery without complications, does have blood tinged urine.  Updated Dr nAdrade about intensity of pain and location. Ordered toradol and oxybutin to take home also. Given toradol IV. Will continue to monitor pain.

## 2019-09-09 NOTE — PROGRESS NOTES
Patient referred for Healing Touch due to pain. Pain 7/10 prior to session.  Healing Touch explained to the patient and patient consented to Healing Touch session.  Healing Touch performed by Irene. Pain post session 5.75/10.

## 2019-09-09 NOTE — ANESTHESIA CARE TRANSFER NOTE
Patient: Magdaleno Marie    Procedure(s):  CYSTOURETEROSCOPY, WITH Bilateral RETROGRADE PYELOGRAM, HOLMIUM LASER LITHOTRIPSY OF URETERAL CALCULUS, AND STENT INSERTION    Diagnosis: stone  Diagnosis Additional Information: No value filed.    Anesthesia Type:   General, LMA     Note:  Airway :Face Mask  Patient transferred to:PACU  Comments:   Spontaneous respirations, oral suctioned, bilateral eye opening and hand grasps.  Extubated to FM O2 6lpm.  VSS to PACU.Handoff Report: Identifed the Patient, Identified the Reponsible Provider, Reviewed the pertinent medical history, Discussed the surgical course, Reviewed Intra-OP anesthesia mangement and issues during anesthesia, Set expectations for post-procedure period and Allowed opportunity for questions and acknowledgement of understanding      Vitals: (Last set prior to Anesthesia Care Transfer)    CRNA VITALS  9/9/2019 1258 - 9/9/2019 1335      9/9/2019             Resp Rate (observed):  20                Electronically Signed By: ДМИТРИЙ Glover CRNA  September 9, 2019  1:35 PM

## 2019-09-09 NOTE — OP NOTE
Procedure Date: 09/09/2019      SURGEON:  Arnel Andrade MD.      PREOPERATIVE DIAGNOSIS:  Bilateral ureteral stones.      POSTOPERATIVE DIAGNOSIS:  Bilateral ureteral stones.      PROCEDURE PERFORMED:  Cystoscopy, bilateral retrograde pyelograms, interpretation of fluoroscopic images, bilateral ureteroscopy with holmium laser lithotripsy and stone basketing, placement of 5 x 26 double-J ureteral stents bilaterally.      ANESTHESIA:  General endotracheal.      COMPLICATIONS:  None.      INDICATIONS FOR PROCEDURE:  Magdaleno Marie is a 63-year-old gentleman with bilateral UPJ stones who now presents for removal.      DETAILS OF THE PROCEDURE:  The risks and benefits of the procedure were explained in detail with the patient and informed consent was obtained.  The patient was brought to the operating room, placed supine on the table, where he underwent general endotracheal anesthetic.  He was then moved down to the dorsal lithotomy position where he was prepped and draped in standard sterile fashion.        We began the procedure by introducing the 22-Irish cystoscope through the urethra into the bladder to perform cystoscopy.  There were no urothelial abnormalities identified.  I cannulated the left ureteral catheter and performed retrograde pyelogram.  There was hydronephrosis in the proximal left ureter as well as the left kidney.  I passed a Glidewire in the left kidney and backed off the ureteral catheter along with the scope.  Alongside the Glidewire, I passed the rigid ureteroscope through the urethra, into the bladder and up the left ureter.  The stone was seen in the very proximal left ureter.  I used the 200 micron holmium laser fiber to break up the stone into multiple fragments.  These fragments were basketed out and placed in the bladder.  I then performed ureteroscopy all the way up to the left kidney and no stones remained in the ureter.  I passed a second Glidewire into the left kidney and backed off  the rigid ureteroscope.  I then passed the Olympus flexible ureteroscope over the Glidewire into the left kidney under fluoroscopic guidance.  I performed retrograde pyelogram.  I then performed a complete renoscopy under fluoroscopic guidance as well to make certain I looked in the entire kidney and there were no stones remaining in the left kidney.  I removed the ureteroscope.  I reloaded the cystoscope over the Glidewire until the left ureteral orifice was visualized.  I passed a 5 x 26 double-J ureteral stent over the wire.  The wire was pulled back and a good curl could be seen in the renal pelvis under fluoroscopy.  A good curl was seen in the bladder under direct visualization.  The bladder was drained and I sent all of the stones separately as left kidney stones.        I then cannulated the right ureteral orifice with ureteral catheter and performed a retrograde pyelogram.  The retrograde pyelogram was completely normal.  I passed a Glidewire into the right kidney and backed off the ureteral catheter along with the scope.  Alongside the Glidewire, I passed the rigid ureteroscope through the urethra, into the bladder and up the right ureter.  The right ureter was free of any stones.  I passed a second Glidewire into the right kidney and backed off the rigid scope.  I then attempted to pass the Olympus flexible digital ureteroscope over the Glidewire, but could not get past the right UPJ.  I then backloaded off the scope and then passed the Storz fiberoptics scope up into the right kidney.  I performed a retrograde pyelogram and performed a complete renoscopy.  The stone was then pushed back up into the upper pole of the right kidney.  I used a 200 micron holmium laser fiber on the dusting setting to dust the stone down to very small pieces and then I basketed out this final piece and sent it as a separate specimen labeled right kidney stone.  I reloaded the cystoscope over the Glidewire until the right  ureteral orifice was visualized.  I passed a 5 x 26 double-J ureteral stent over the wire.  The wire was pulled back and a good curl could be seen in the renal pelvis under fluoroscopy.  A good curl was seen in the bladder under direct visualization.  The bladder was drained, the scope was removed.  I placed a B and O suppository at the end the case.      The patient tolerated the procedure well without complications.  He went to the post-anesthetic care in good condition.  He will go home from there.  I will see him back in 1 week for a stent out appointment and he will have a KUB beforehand as well.         LOKESH PAGAN MD             D: 2019   T: 2019   MT: ANASTASIA      Name:     DANNIE JONES   MRN:      [id number]        Account:        [id number]   :      1955           Procedure Date: 2019      Document: [id number]

## 2019-09-09 NOTE — ANESTHESIA PREPROCEDURE EVALUATION
Anesthesia Pre-Procedure Evaluation    Patient: Magdaleno Marie   MRN: [id number] : 1955          Preoperative Diagnosis: stone    Procedure(s):  CYSTOURETEROSCOPY, WITH RETROGRADE PYELOGRAM, HOLMIUM LASER LITHOTRIPSY OF URETERAL CALCULUS, AND STENT INSERTION    Past Medical History:   Diagnosis Date     Calculus of kidney     kidney stones     Hypertension      Past Surgical History:   Procedure Laterality Date     COLONOSCOPY       COMBINED CYSTOSCOPY, RETROGRADES, URETEROSCOPY, LASER HOLMIUM LITHOTRIPSY URETER(S), INSERT STENT Left 2016    Procedure: COMBINED CYSTOSCOPY, RETROGRADES, URETEROSCOPY, LASER HOLMIUM LITHOTRIPSY URETER(S), INSERT STENT;  Surgeon: Arnel Andrade MD;  Location:  OR     GENITOURINARY SURGERY      shock wave lithotripsy     Anesthesia Evaluation     .             ROS/MED HX    ENT/Pulmonary:  - neg pulmonary ROS     Neurologic:  - neg neurologic ROS    (-) seizures and Neuropathy   Cardiovascular:     (+) hypertension----. : . . . :. .      (-) CAD, syncope and arrhythmias   METS/Exercise Tolerance:  >4 METS   Hematologic:  - neg hematologic  ROS       Musculoskeletal:  - neg musculoskeletal ROS       GI/Hepatic:  - neg GI/hepatic ROS      (-) GERD   Renal/Genitourinary:     (+) Nephrolithiasis ,       Endo:  - neg endo ROS       Psychiatric:  - neg psychiatric ROS       Infectious Disease:  - neg infectious disease ROS       Malignancy:      - no malignancy   Other:                          Physical Exam  Normal systems: cardiovascular, pulmonary and dental    Airway   Mallampati: II  TM distance: >3 FB  Neck ROM: full    Dental     Cardiovascular       Pulmonary             Lab Results   Component Value Date    WBC 8.6 2016    HGB 16.3 2016    HCT 46.3 2016     (L) 2016     2016    POTASSIUM 3.8 2016    CHLORIDE 104 2016    CO2 25 2016    BUN 22 2016    CR 0.97 2016    GLC 99  "05/28/2016    CHRISTOPH 9.0 05/28/2016       Preop Vitals  BP Readings from Last 3 Encounters:   05/30/17 160/90   07/28/16 (!) 154/94   05/28/16 (!) 183/105    Pulse Readings from Last 3 Encounters:   05/30/17 70   05/28/16 64      Resp Readings from Last 3 Encounters:   07/28/16 16   05/28/16 18    SpO2 Readings from Last 3 Encounters:   07/28/16 96%   05/28/16 98%      Temp Readings from Last 1 Encounters:   07/28/16 97.8  F (36.6  C) (Temporal)    Ht Readings from Last 1 Encounters:   09/09/19 1.803 m (5' 10.98\")      Wt Readings from Last 1 Encounters:   09/09/19 100.2 kg (221 lb)    Estimated body mass index is 30.84 kg/m  as calculated from the following:    Height as of this encounter: 1.803 m (5' 10.98\").    Weight as of this encounter: 100.2 kg (221 lb).       Anesthesia Plan      History & Physical Review  History and physical reviewed and following examination; no interval change.    ASA Status:  2 .    NPO Status:  > 8 hours    Plan for General and LMA with Intravenous induction. Maintenance will be Inhalation.    PONV prophylaxis:  Ondansetron (or other 5HT-3) and Dexamethasone or Solumedrol       Postoperative Care  Postoperative pain management:  IV analgesics, Multi-modal analgesia and Oral pain medications.      Consents  Anesthetic plan, risks, benefits and alternatives discussed with:  Patient..                 Frantz Barrow MD                    .  "

## 2019-09-10 ENCOUNTER — TELEPHONE (OUTPATIENT)
Dept: UROLOGY | Facility: CLINIC | Age: 64
End: 2019-09-10

## 2019-09-10 NOTE — TELEPHONE ENCOUNTER
"Urology pt of Dr. Andrade s/p Cystoscopy with bilateral ureteroscopy with holmium laser lithotripsy and stone basketing, and placement of double-J ureteral stents bilaterally yesterday 9/9/2019.   Marcoscornelio calls today reporting he's doing well. He describes his pain now as \"very little\" and urine is clearing. He was given both Norco and Toradol for pain. He has 2 Norco left. He states the Toradol is also working pretty well for his pain. He has more of those left and a refill. He feels this should be sufficient coverage of his pain but will call us, if needed.  Per Dr. Andrade, may Rx Tramadol 50mg, 1 tab q 4-6 h, PRN, if needed.        "

## 2019-09-11 ENCOUNTER — TELEPHONE (OUTPATIENT)
Dept: UROLOGY | Facility: CLINIC | Age: 64
End: 2019-09-11

## 2019-09-11 LAB
APPEARANCE STONE: NORMAL
APPEARANCE STONE: NORMAL
COMPN STONE: NORMAL
COMPN STONE: NORMAL
NUMBER STONE: 1
NUMBER STONE: NORMAL
SIZE STONE: NORMAL MM
SIZE STONE: NORMAL MM
WT STONE: 118 MG
WT STONE: 7 MG

## 2019-09-11 NOTE — TELEPHONE ENCOUNTER
This message is incorrect. Pt's Toradol has a refill; not hydrocodone. Called pt. He is aware of this.     [facility] Call Center    Phone Message    May a detailed message be left on voicemail: yes    Reason for Call: Other: Pt called wanting to know from Dr. Arnel Andrade what pharmacy was the HYDROcodone 5-325mgs sent to? Pt got a call from the pharmacy this morning stating his medicaton was ready for pickup, went wrong location. Please call pt, thank you     Action Taken: Message routed to:  [facility] ([facility]): Urology

## 2019-09-19 ENCOUNTER — OFFICE VISIT (OUTPATIENT)
Dept: UROLOGY | Facility: CLINIC | Age: 64
End: 2019-09-19
Payer: COMMERCIAL

## 2019-09-19 ENCOUNTER — HOSPITAL ENCOUNTER (OUTPATIENT)
Dept: GENERAL RADIOLOGY | Facility: CLINIC | Age: 64
Discharge: HOME | End: 2019-09-19
Attending: UROLOGY | Admitting: UROLOGY
Payer: COMMERCIAL

## 2019-09-19 VITALS
WEIGHT: 220 LBS | SYSTOLIC BLOOD PRESSURE: 138 MMHG | OXYGEN SATURATION: 97 % | BODY MASS INDEX: 30.8 KG/M2 | HEART RATE: 66 BPM | DIASTOLIC BLOOD PRESSURE: 88 MMHG | HEIGHT: 71 IN

## 2019-09-19 DIAGNOSIS — N20.0 KIDNEY STONE: ICD-10-CM

## 2019-09-19 DIAGNOSIS — N20.0 CALCULUS OF KIDNEY: ICD-10-CM

## 2019-09-19 DIAGNOSIS — Z79.2 PROPHYLACTIC ANTIBIOTIC: Primary | ICD-10-CM

## 2019-09-19 PROCEDURE — 52310 CYSTOSCOPY AND TREATMENT: CPT | Performed by: UROLOGY

## 2019-09-19 PROCEDURE — 74019 RADEX ABDOMEN 2 VIEWS: CPT

## 2019-09-19 PROCEDURE — 99213 OFFICE O/P EST LOW 20 MIN: CPT | Mod: 25 | Performed by: UROLOGY

## 2019-09-19 RX ORDER — CIPROFLOXACIN 500 MG/1
500 TABLET, FILM COATED ORAL ONCE
Qty: 1 TABLET | Refills: 0 | Status: SHIPPED | OUTPATIENT
Start: 2019-09-19 | End: 2019-09-19

## 2019-09-19 RX ORDER — LIDOCAINE HYDROCHLORIDE 20 MG/ML
JELLY TOPICAL ONCE
Status: DISCONTINUED | OUTPATIENT
Start: 2019-09-19 | End: 2019-09-20 | Stop reason: HOSPADM

## 2019-09-19 RX ORDER — POTASSIUM CITRATE 10 MEQ/1
20 TABLET, EXTENDED RELEASE ORAL 2 TIMES DAILY WITH MEALS
Qty: 360 TABLET | Refills: 3 | Status: ON HOLD | OUTPATIENT
Start: 2019-09-19 | End: 2022-01-21

## 2019-09-19 ASSESSMENT — PAIN SCALES - GENERAL: PAINLEVEL: NO PAIN (0)

## 2019-09-19 ASSESSMENT — MIFFLIN-ST. JEOR: SCORE: 1805.04

## 2019-09-19 NOTE — PROGRESS NOTES
Office Visit Note  [facility]  [phone]    UROLOGIC DIAGNOSES:   Bilateral kidney stones    CURRENT INTERVENTIONS:   S/P bilateral ureteroscopy    HISTORY:   Magdaleno returns to clinic today for postoperative stent removal.  He underwent bilateral ureteroscopy to remove bilateral kidney stones.  On one side the stone was composed of uric acid, on the other side the stone was composed of calcium oxalate.  He has found his stents to be bothersome but otherwise has felt well since his procedure.  He had a KUB today as well.      PAST MEDICAL HISTORY:   Past Medical History:   Diagnosis Date     Calculus of kidney     kidney stones     Hypertension        PAST SURGICAL HISTORY:   Past Surgical History:   Procedure Laterality Date     COLONOSCOPY       COMBINED CYSTOSCOPY, RETROGRADES, URETEROSCOPY, LASER HOLMIUM LITHOTRIPSY URETER(S), INSERT STENT Left 7/28/2016    Procedure: COMBINED CYSTOSCOPY, RETROGRADES, URETEROSCOPY, LASER HOLMIUM LITHOTRIPSY URETER(S), INSERT STENT;  Surgeon: Arnel Andrade MD;  Location:  OR     COMBINED CYSTOSCOPY, RETROGRADES, URETEROSCOPY, LASER HOLMIUM LITHOTRIPSY URETER(S), INSERT STENT Bilateral 9/9/2019    Procedure: Cystoscopy, bilateral retrograde pyelograms, interpretation of fluoroscopic images, bilateral ureteroscopy with holmium laser lithotripsy and stone basketing, placement of 5 x 26 double-J ureteral stents bilaterally;  Surgeon: Arnel Andrade MD;  Location:  OR     GENITOURINARY SURGERY  1980's    shock wave lithotripsy       FAMILY HISTORY: No family history on file.    SOCIAL HISTORY:   Social History     Tobacco Use     Smoking status: Never Smoker     Smokeless tobacco: Never Used   Substance Use Topics     Alcohol use: Yes     Comment: 8-10 drinks per  week       Current Outpatient Medications   Medication     HYDROcodone-acetaminophen (NORCO) 5-325 MG tablet     ibuprofen (ADVIL/MOTRIN) 600 MG tablet     ketorolac (TORADOL) 10 MG  tablet     lisinopril (PRINIVIL/ZESTRIL) 10 MG tablet     oxybutynin (DITROPAN) 5 MG tablet     oxybutynin (DITROPAN) 5 MG tablet     No current facility-administered medications for this visit.          PHYSICAL EXAM:    There were no vitals taken for this visit.    Constitutional: Well developed. Conversant and in no acute distress  Eyes: Anicteric sclera, conjunctiva clear, normal extraocular movements  ENT: Normocephalic and atraumatic,   Skin: Warm and dry. No rashes or lesions  Cardiac: No peripheral edema  Back/Flank: Not done  CNS/PNS: Normal musculature and movements, moves all extremities normally  Respiratory: Normal non-labored breathing  Abdomen: Soft nontender and nondistended  Peripheral Vascular: No peripheral edema  Mental Status/Psych: Alert and Oriented x 3. Normal mood and affect    Penis: Normal  Scrotal skin: Normal, no lesions  Testicles: Normal to palpation bilaterally  Epididymis: Normal to palpation bilaterally  Lymphatic: Normal inguinal lymph nodes  Digital Rectal Exam:     Cystoscopy: I performed flexible cystoscopy today and both stents were removed without difficulty    Imaging: I reviewed his KUB images.  Stents in good position.  Perhaps a small fragment remaining in the right kidney    Urinalysis: UA RESULTS:  Recent Labs   Lab Test 05/28/16  1424   COLOR Light Yellow   APPEARANCE Clear   URINEGLC Negative   URINEBILI Negative   URINEKETONE Negative   SG 1.003   UBLD Moderate*   URINEPH 6.5   PROTEIN Negative   NITRITE Negative   LEUKEST Negative   RBCU 26*   WBCU <1       PSA:     Post Void Residual:     Other labs: None today      IMPRESSION:  Doing well, stents removed    PLAN:  We discussed his stone analysis results and prevention methods for future stones.  He had previously taken potassium citrate but it stopped.  I recommended that he go back on the potassium citrate and recommended he take 20meq twice daily.  I wrote him a new prescription and gave him instructions.  We  discussed dietary recommendations as well.  I would like to see him back in 6 months with a CT scan to make certain he is not forming any new stones.    Total Time: 20 min                                      Total in Consultation: 15 min      Arnel Andrade M.D.

## 2019-09-19 NOTE — PATIENT INSTRUCTIONS

## 2019-09-19 NOTE — LETTER
9/19/2019       RE: Magdaleno Marie  [id number]  [place] [address]     Dear Colleague,    Thank you for referring your patient, Magdaleno Marie, to the [facility] at [facility]. Please see a copy of my visit note below.    Office Visit Note  [facility]  [phone]    UROLOGIC DIAGNOSES:   Bilateral kidney stones    CURRENT INTERVENTIONS:   S/P bilateral ureteroscopy    HISTORY:   Magdaleno returns to clinic today for postoperative stent removal.  He underwent bilateral ureteroscopy to remove bilateral kidney stones.  On one side the stone was composed of uric acid, on the other side the stone was composed of calcium oxalate.  He has found his stents to be bothersome but otherwise has felt well since his procedure.  He had a KUB today as well.      PAST MEDICAL HISTORY:   Past Medical History:   Diagnosis Date     Calculus of kidney     kidney stones     Hypertension        PAST SURGICAL HISTORY:   Past Surgical History:   Procedure Laterality Date     COLONOSCOPY       COMBINED CYSTOSCOPY, RETROGRADES, URETEROSCOPY, LASER HOLMIUM LITHOTRIPSY URETER(S), INSERT STENT Left 7/28/2016    Procedure: COMBINED CYSTOSCOPY, RETROGRADES, URETEROSCOPY, LASER HOLMIUM LITHOTRIPSY URETER(S), INSERT STENT;  Surgeon: Arnel Andrade MD;  Location:  OR     COMBINED CYSTOSCOPY, RETROGRADES, URETEROSCOPY, LASER HOLMIUM LITHOTRIPSY URETER(S), INSERT STENT Bilateral 9/9/2019    Procedure: Cystoscopy, bilateral retrograde pyelograms, interpretation of fluoroscopic images, bilateral ureteroscopy with holmium laser lithotripsy and stone basketing, placement of 5 x 26 double-J ureteral stents bilaterally;  Surgeon: Arnel Andrade MD;  Location:  OR     GENITOURINARY SURGERY  1980's    shock wave lithotripsy       FAMILY HISTORY: No family history on file.    SOCIAL HISTORY:   Social History     Tobacco  Use     Smoking status: Never Smoker     Smokeless tobacco: Never Used   Substance Use Topics     Alcohol use: Yes     Comment: 8-10 drinks per  week       Current Outpatient Medications   Medication     HYDROcodone-acetaminophen (NORCO) 5-325 MG tablet     ibuprofen (ADVIL/MOTRIN) 600 MG tablet     ketorolac (TORADOL) 10 MG tablet     lisinopril (PRINIVIL/ZESTRIL) 10 MG tablet     oxybutynin (DITROPAN) 5 MG tablet     oxybutynin (DITROPAN) 5 MG tablet     No current facility-administered medications for this visit.          PHYSICAL EXAM:    There were no vitals taken for this visit.    Constitutional: Well developed. Conversant and in no acute distress  Eyes: Anicteric sclera, conjunctiva clear, normal extraocular movements  ENT: Normocephalic and atraumatic,   Skin: Warm and dry. No rashes or lesions  Cardiac: No peripheral edema  Back/Flank: Not done  CNS/PNS: Normal musculature and movements, moves all extremities normally  Respiratory: Normal non-labored breathing  Abdomen: Soft nontender and nondistended  Peripheral Vascular: No peripheral edema  Mental Status/Psych: Alert and Oriented x 3. Normal mood and affect    Penis: Normal  Scrotal skin: Normal, no lesions  Testicles: Normal to palpation bilaterally  Epididymis: Normal to palpation bilaterally  Lymphatic: Normal inguinal lymph nodes  Digital Rectal Exam:     Cystoscopy: I performed flexible cystoscopy today and both stents were removed without difficulty    Imaging: I reviewed his KUB images.  Stents in good position.  Perhaps a small fragment remaining in the right kidney    Urinalysis: UA RESULTS:  Recent Labs   Lab Test 05/28/16  1424   COLOR Light Yellow   APPEARANCE Clear   URINEGLC Negative   URINEBILI Negative   URINEKETONE Negative   SG 1.003   UBLD Moderate*   URINEPH 6.5   PROTEIN Negative   NITRITE Negative   LEUKEST Negative   RBCU 26*   WBCU <1       PSA:     Post Void Residual:     Other labs: None today      IMPRESSION:  Doing well,  stents removed    PLAN:  We discussed his stone analysis results and prevention methods for future stones.  He had previously taken potassium citrate but it stopped.  I recommended that he go back on the potassium citrate and recommended he take 20meq twice daily.  I wrote him a new prescription and gave him instructions.  We discussed dietary recommendations as well.  I would like to see him back in 6 months with a CT scan to make certain he is not forming any new stones.    Total Time: 20 min                                      Total in Consultation: 15 min      Arnel Andrade M.D.

## 2019-09-19 NOTE — NURSING NOTE
Chief Complaint   Patient presents with     Cystoscopy     Pt here for stent removal     Prior to the start of the procedure and with procedural staff participation, I verbally confirmed the patient s identity using two indicators, relevant allergies, that the procedure was appropriate and matched the consent or emergent situation, and that the correct equipment/implants were available. Immediately prior to starting the procedure I conducted the Time Out with the procedural staff and re-confirmed the patient s name, procedure, and site/side. I have wiped the patient off with the povidone-Iodine solution, draped them,  used Lidocaine hydrochloride jelly, and instilled sterile water into the bladder. (The Joint Commission universal protocol was followed.)  Yes    Sedation (Moderate or Deep): None  5mL 2% lidocaine hydrochloride Urojet instilled into urethra.    NDC# [address]-84  Lot #: [id number]  Expiration Date:  5/21    Jennifer Guaman CMA

## 2020-02-10 ENCOUNTER — HEALTH MAINTENANCE LETTER (OUTPATIENT)
Age: 65
End: 2020-02-10

## 2020-03-17 DIAGNOSIS — N20.0 CALCULUS OF KIDNEY: Primary | ICD-10-CM

## 2020-06-11 ENCOUNTER — HOSPITAL ENCOUNTER (OUTPATIENT)
Dept: CT IMAGING | Facility: CLINIC | Age: 65
Discharge: HOME | End: 2020-06-11
Attending: UROLOGY | Admitting: UROLOGY
Payer: COMMERCIAL

## 2020-06-11 ENCOUNTER — VIRTUAL VISIT (OUTPATIENT)
Dept: UROLOGY | Facility: CLINIC | Age: 65
End: 2020-06-11
Payer: COMMERCIAL

## 2020-06-11 VITALS — WEIGHT: 220 LBS | HEIGHT: 71 IN | BODY MASS INDEX: 30.8 KG/M2

## 2020-06-11 DIAGNOSIS — N20.0 NEPHROLITHIASIS: Primary | ICD-10-CM

## 2020-06-11 DIAGNOSIS — N20.0 KIDNEY STONE: ICD-10-CM

## 2020-06-11 PROCEDURE — 74176 CT ABD & PELVIS W/O CONTRAST: CPT

## 2020-06-11 PROCEDURE — 99212 OFFICE O/P EST SF 10 MIN: CPT | Mod: 95 | Performed by: UROLOGY

## 2020-06-11 ASSESSMENT — MIFFLIN-ST. JEOR: SCORE: 1800.04

## 2020-06-11 ASSESSMENT — PAIN SCALES - GENERAL: PAINLEVEL: NO PAIN (0)

## 2020-06-11 NOTE — PROGRESS NOTES
"Magdaleno Marie is a 64 year old male who is being evaluated via a billable video visit.      The patient has been notified of following:     \"This video visit will be conducted via a call between you and your physician/provider. We have found that certain health care needs can be provided without the need for an in-person physical exam.  This service lets us provide the care you need with a video conversation.  If a prescription is necessary we can send it directly to your pharmacy.  If lab work is needed we can place an order for that and you can then stop by our lab to have the test done at a later time.    Video visits are billed at different rates depending on your insurance coverage.  Please reach out to your insurance provider with any questions.    If during the course of the call the physician/provider feels a video visit is not appropriate, you will not be charged for this service.\"    Patient has given verbal consent for Video visit? Yes    Will anyone else be joining your video visit? No      Office Visit Note  [facility]  [phone]    UROLOGIC DIAGNOSES:   History of kidney stones    CURRENT INTERVENTIONS:   Prior ureteroscopy, potassium citrate    HISTORY:   Magdaleno is a set up for virtual visit today to follow-up on recent CT scan.  He has had no pain or symptoms for the past 9 months.  He has been taking potassium citrate twice daily.      PAST MEDICAL HISTORY:   Past Medical History:   Diagnosis Date     Calculus of kidney     kidney stones     Hypertension        PAST SURGICAL HISTORY:   Past Surgical History:   Procedure Laterality Date     COLONOSCOPY       COMBINED CYSTOSCOPY, RETROGRADES, URETEROSCOPY, LASER HOLMIUM LITHOTRIPSY URETER(S), INSERT STENT Left 7/28/2016    Procedure: COMBINED CYSTOSCOPY, RETROGRADES, URETEROSCOPY, LASER HOLMIUM LITHOTRIPSY URETER(S), INSERT STENT;  Surgeon: Arnel Andrade MD;  Location:  OR     COMBINED CYSTOSCOPY, RETROGRADES, " URETEROSCOPY, LASER HOLMIUM LITHOTRIPSY URETER(S), INSERT STENT Bilateral 9/9/2019    Procedure: Cystoscopy, bilateral retrograde pyelograms, interpretation of fluoroscopic images, bilateral ureteroscopy with holmium laser lithotripsy and stone basketing, placement of 5 x 26 double-J ureteral stents bilaterally;  Surgeon: Arnel Andrade MD;  Location: [facility] OR     CYSTOSCOPY       GENITOURINARY SURGERY  1980's    shock wave lithotripsy       FAMILY HISTORY: No family history on file.    SOCIAL HISTORY:   Social History     Socioeconomic History     Marital status:      Spouse name: None     Number of children: None     Years of education: None     Highest education level: None   Occupational History     None   Social Needs     Financial resource strain: None     Food insecurity     Worry: None     Inability: None     Transportation needs     Medical: None     Non-medical: None   Tobacco Use     Smoking status: Never Smoker     Smokeless tobacco: Never Used   Substance and Sexual Activity     Alcohol use: Yes     Comment: 8-10 drinks per  week     Drug use: No     Sexual activity: None   Lifestyle     Physical activity     Days per week: None     Minutes per session: None     Stress: None   Relationships     Social connections     Talks on phone: None     Gets together: None     Attends Jain service: None     Active member of club or organization: None     Attends meetings of clubs or organizations: None     Relationship status: None     Intimate partner violence     Fear of current or ex partner: None     Emotionally abused: None     Physically abused: None     Forced sexual activity: None   Other Topics Concern     Parent/sibling w/ CABG, MI or angioplasty before 65F 55M? Not Asked   Social History Narrative     None       Review Of Systems:  Skin: No rash, pruritis, or skin pigmentation  Eyes: No changes in vision  Ears/Nose/Throat: No changes in hearing, no nosebleeds  Respiratory: No shortness  "of breath, dyspnea on exertion, cough, or hemoptysis  Cardiovascular: No chest pain or palpitations  Gastrointestinal: No diarrhea or constipation. No abdominal pain. No hematochezia  Genitourinary: see HPI  Musculoskeletal: No pain or swelling of joints, normal range of motion  Neurologic: No weakness or tremors  Psychiatric: No recent changes in memory or mood  Hematologic/Lymphatic/Immunologic: No easy bruising or enlarged lymph nodes  Endocrine: No weight gain or loss      PHYSICAL EXAM:    Ht 1.803 m (5' 11\")   Wt 99.8 kg (220 lb)   BMI 30.68 kg/m      Constitutional: Well developed. Conversant and in no acute distress  Eyes: Anicteric sclera, conjunctiva clear, normal extraocular movements  ENT: Normocephalic and atraumatic,   Skin: Warm and dry. No rashes or lesions  Cardiac: No peripheral edema  Back/Flank: Not done  CNS/PNS: Normal musculature and movements, moves all extremities normally  Respiratory: Normal non-labored breathing  Abdomen: Soft nontender and nondistended  Peripheral Vascular: No peripheral edema  Mental Status/Psych: Alert and Oriented x 3. Normal mood and affect    Penis: Not done  Scrotal Skin: Not done  Testicles: Not done  Epididymis: Not done  Digital Rectal Exam:     Cystoscopy: Not done    Imaging: I reviewed his CT scan images.  A few tiny punctate renal calculi in the right kidney and nothing on the left side.    Urinalysis: UA RESULTS:  Recent Labs   Lab Test 05/28/16  1424   COLOR Light Yellow   APPEARANCE Clear   URINEGLC Negative   URINEBILI Negative   URINEKETONE Negative   SG 1.003   UBLD Moderate*   URINEPH 6.5   PROTEIN Negative   NITRITE Negative   LEUKEST Negative   RBCU 26*   WBCU <1       PSA:     Post Void Residual:     Other labs: None today      IMPRESSION:  Doing well    PLAN:  He is doing well and he has developed no new kidney stones in the past 9 months.  We discussed prevention methods for stones again and I recommended that he continue with the potassium " citrate.  He will follow-up with me as needed in the future.      Arnel Andrade M.D.              Video-Visit Details    Type of service:  Video Visit    Video Start Time: 11:43 AM  Video End Time: 11:49AM    Originating Location (pt. Location): Home    Distant Location (provider location):  [facility] [place]     Platform used for Video Visit: [employer]    Arnel Andrade MD

## 2020-06-11 NOTE — LETTER
"6/11/2020       RE: Magdaleno Marie  [id number]  [place] [address]     Dear Colleague,    Thank you for referring your patient, Magdaleno Marie, to the [facility] [place] at [facility]. Please see a copy of my visit note below.    Magdaleno Marie is a 64 year old male who is being evaluated via a billable video visit.      The patient has been notified of following:     \"This video visit will be conducted via a call between you and your physician/provider. We have found that certain health care needs can be provided without the need for an in-person physical exam.  This service lets us provide the care you need with a video conversation.  If a prescription is necessary we can send it directly to your pharmacy.  If lab work is needed we can place an order for that and you can then stop by our lab to have the test done at a later time.    Video visits are billed at different rates depending on your insurance coverage.  Please reach out to your insurance provider with any questions.    If during the course of the call the physician/provider feels a video visit is not appropriate, you will not be charged for this service.\"    Patient has given verbal consent for Video visit? Yes    Will anyone else be joining your video visit? No      Office Visit Note  [facility]  [phone]    UROLOGIC DIAGNOSES:   History of kidney stones    CURRENT INTERVENTIONS:   Prior ureteroscopy, potassium citrate    HISTORY:   Magdaleno is a set up for virtual visit today to follow-up on recent CT scan.  He has had no pain or symptoms for the past 9 months.  He has been taking potassium citrate twice daily.      PAST MEDICAL HISTORY:   Past Medical History:   Diagnosis Date     Calculus of kidney     kidney stones     Hypertension        PAST SURGICAL HISTORY:   Past Surgical History:   Procedure Laterality Date     COLONOSCOPY       " COMBINED CYSTOSCOPY, RETROGRADES, URETEROSCOPY, LASER HOLMIUM LITHOTRIPSY URETER(S), INSERT STENT Left 7/28/2016    Procedure: COMBINED CYSTOSCOPY, RETROGRADES, URETEROSCOPY, LASER HOLMIUM LITHOTRIPSY URETER(S), INSERT STENT;  Surgeon: Arnel Andrade MD;  Location:  OR     COMBINED CYSTOSCOPY, RETROGRADES, URETEROSCOPY, LASER HOLMIUM LITHOTRIPSY URETER(S), INSERT STENT Bilateral 9/9/2019    Procedure: Cystoscopy, bilateral retrograde pyelograms, interpretation of fluoroscopic images, bilateral ureteroscopy with holmium laser lithotripsy and stone basketing, placement of 5 x 26 double-J ureteral stents bilaterally;  Surgeon: Arnel Andrade MD;  Location:  OR     CYSTOSCOPY       GENITOURINARY SURGERY  1980's    shock wave lithotripsy       FAMILY HISTORY: No family history on file.    SOCIAL HISTORY:   Social History     Socioeconomic History     Marital status:      Spouse name: None     Number of children: None     Years of education: None     Highest education level: None   Occupational History     None   Social Needs     Financial resource strain: None     Food insecurity     Worry: None     Inability: None     Transportation needs     Medical: None     Non-medical: None   Tobacco Use     Smoking status: Never Smoker     Smokeless tobacco: Never Used   Substance and Sexual Activity     Alcohol use: Yes     Comment: 8-10 drinks per  week     Drug use: No     Sexual activity: None   Lifestyle     Physical activity     Days per week: None     Minutes per session: None     Stress: None   Relationships     Social connections     Talks on phone: None     Gets together: None     Attends Denominational service: None     Active member of club or organization: None     Attends meetings of clubs or organizations: None     Relationship status: None     Intimate partner violence     Fear of current or ex partner: None     Emotionally abused: None     Physically abused: None     Forced sexual activity:  "None   Other Topics Concern     Parent/sibling w/ CABG, MI or angioplasty before 65F 55M? Not Asked   Social History Narrative     None       Review Of Systems:  Skin: No rash, pruritis, or skin pigmentation  Eyes: No changes in vision  Ears/Nose/Throat: No changes in hearing, no nosebleeds  Respiratory: No shortness of breath, dyspnea on exertion, cough, or hemoptysis  Cardiovascular: No chest pain or palpitations  Gastrointestinal: No diarrhea or constipation. No abdominal pain. No hematochezia  Genitourinary: see HPI  Musculoskeletal: No pain or swelling of joints, normal range of motion  Neurologic: No weakness or tremors  Psychiatric: No recent changes in memory or mood  Hematologic/Lymphatic/Immunologic: No easy bruising or enlarged lymph nodes  Endocrine: No weight gain or loss      PHYSICAL EXAM:    Ht 1.803 m (5' 11\")   Wt 99.8 kg (220 lb)   BMI 30.68 kg/m      Constitutional: Well developed. Conversant and in no acute distress  Eyes: Anicteric sclera, conjunctiva clear, normal extraocular movements  ENT: Normocephalic and atraumatic,   Skin: Warm and dry. No rashes or lesions  Cardiac: No peripheral edema  Back/Flank: Not done  CNS/PNS: Normal musculature and movements, moves all extremities normally  Respiratory: Normal non-labored breathing  Abdomen: Soft nontender and nondistended  Peripheral Vascular: No peripheral edema  Mental Status/Psych: Alert and Oriented x 3. Normal mood and affect    Penis: Not done  Scrotal Skin: Not done  Testicles: Not done  Epididymis: Not done  Digital Rectal Exam:     Cystoscopy: Not done    Imaging: I reviewed his CT scan images.  A few tiny punctate renal calculi in the right kidney and nothing on the left side.    Urinalysis: UA RESULTS:  Recent Labs   Lab Test 05/28/16  1424   COLOR Light Yellow   APPEARANCE Clear   URINEGLC Negative   URINEBILI Negative   URINEKETONE Negative   SG 1.003   UBLD Moderate*   URINEPH 6.5   PROTEIN Negative   NITRITE Negative   LEUKEST " Negative   RBCU 26*   WBCU <1       PSA:     Post Void Residual:     Other labs: None today      IMPRESSION:  Doing well    PLAN:  He is doing well and he has developed no new kidney stones in the past 9 months.  We discussed prevention methods for stones again and I recommended that he continue with the potassium citrate.  He will follow-up with me as needed in the future.      Arnel Andrade M.D.              Video-Visit Details    Type of service:  Video Visit    Video Start Time: 11:43 AM  Video End Time: 11:49AM    Originating Location (pt. Location): Home    Distant Location (provider location):  [facility] [place]     Platform used for Video Visit: [employer]    Arnel Andrade MD

## 2020-11-10 ENCOUNTER — MEDICAL CORRESPONDENCE (OUTPATIENT)
Dept: HEALTH INFORMATION MANAGEMENT | Facility: CLINIC | Age: 65
End: 2020-11-10

## 2020-11-16 ENCOUNTER — HEALTH MAINTENANCE LETTER (OUTPATIENT)
Age: 65
End: 2020-11-16

## 2021-04-03 ENCOUNTER — HEALTH MAINTENANCE LETTER (OUTPATIENT)
Age: 66
End: 2021-04-03

## 2021-09-16 ENCOUNTER — TELEPHONE (OUTPATIENT)
Dept: UROLOGY | Facility: CLINIC | Age: 66
End: 2021-09-16

## 2021-09-16 DIAGNOSIS — R10.9 FLANK PAIN: Primary | ICD-10-CM

## 2021-09-16 NOTE — TELEPHONE ENCOUNTER
"Per MD- \"    If he things he has another kidney stone he should have noncontrast CT abd/pelv and also a video visit with me      Judy Barrow LPN    "

## 2021-09-16 NOTE — TELEPHONE ENCOUNTER
[facility] Call Center    Phone Message    May a detailed message be left on voicemail: yes     Reason for Call: Other: Pt would like a call back to discuss his options for a kidney stone      Action Taken: Message routed to:  [facility] Uro    Travel Screening: Not Applicable

## 2021-09-16 NOTE — TELEPHONE ENCOUNTER
Patient called nurse line again and LM. His abdomen is in some discomfort and he thinks he has a stone. So far discomfort is managed with some tylenol and Ibuprofen. Patient was informed that a note would be sent to MD and a return phone call would be made to patient is case of ordered imaging.     Judy Barrow LPN

## 2021-09-17 ENCOUNTER — HOSPITAL ENCOUNTER (OUTPATIENT)
Dept: CT IMAGING | Facility: CLINIC | Age: 66
Discharge: HOME | End: 2021-09-17
Attending: UROLOGY | Admitting: UROLOGY
Payer: COMMERCIAL

## 2021-09-17 DIAGNOSIS — R10.9 FLANK PAIN: ICD-10-CM

## 2021-09-17 PROCEDURE — 74176 CT ABD & PELVIS W/O CONTRAST: CPT

## 2021-09-18 ENCOUNTER — HEALTH MAINTENANCE LETTER (OUTPATIENT)
Age: 66
End: 2021-09-18

## 2021-09-20 ENCOUNTER — VIRTUAL VISIT (OUTPATIENT)
Dept: UROLOGY | Facility: CLINIC | Age: 66
End: 2021-09-20
Payer: COMMERCIAL

## 2021-09-20 ENCOUNTER — LAB (OUTPATIENT)
Dept: LAB | Facility: CLINIC | Age: 66
End: 2021-09-20
Payer: COMMERCIAL

## 2021-09-20 VITALS — HEIGHT: 71 IN | WEIGHT: 228 LBS | BODY MASS INDEX: 31.92 KG/M2

## 2021-09-20 DIAGNOSIS — N20.0 KIDNEY STONE: Primary | ICD-10-CM

## 2021-09-20 DIAGNOSIS — R97.20 ELEVATED PROSTATE SPECIFIC ANTIGEN (PSA): ICD-10-CM

## 2021-09-20 PROCEDURE — 99213 OFFICE O/P EST LOW 20 MIN: CPT | Mod: 95 | Performed by: UROLOGY

## 2021-09-20 RX ORDER — POTASSIUM CITRATE 10 MEQ/1
20 TABLET, EXTENDED RELEASE ORAL
Qty: 360 TABLET | Refills: 4 | Status: ON HOLD | OUTPATIENT
Start: 2021-09-20 | End: 2022-03-31

## 2021-09-20 ASSESSMENT — PAIN SCALES - GENERAL: PAINLEVEL: NO PAIN (0)

## 2021-09-20 ASSESSMENT — MIFFLIN-ST. JEOR: SCORE: 1831.33

## 2021-09-20 NOTE — PROGRESS NOTES
MAGDALENO is a 65 year old who is being evaluated via a billable video visit.      How would you like to obtain your AVS? [email or web address]  If the video visit is dropped, the invitation should be resent by: Other e-mail: [email or web address]  Will anyone else be joining your video visit? No         Office Visit Note  [facility]  [phone]    UROLOGIC DIAGNOSES:   History of uric acid and calcium kidney stones  Elevated PSA    CURRENT INTERVENTIONS:   Potassium citrate    HISTORY:   Magdaleno had recurrence of hematuria on Thursday next week along with right sided abdominal pain. He had CT scan on Friday and it showed a 4-5mm proximal left ureteral stone. He passed the stone on Sunday. His pain resolved immediately.  He currently feels well.  He had another 7 mm stone in the right kidney.    I had also noted on my chart that his PSA was high last year.  His PSA was 4.66 at his annual physical.  This was his first PSA elevation.  The patient was not aware of this.  He has no urinary symptoms or complaints.    Patient unable to connect to my chart so this ended up being a telephone visit    PAST MEDICAL HISTORY:   Past Medical History:   Diagnosis Date     Calculus of kidney     kidney stones     Hypertension        PAST SURGICAL HISTORY:   Past Surgical History:   Procedure Laterality Date     COLONOSCOPY       COMBINED CYSTOSCOPY, RETROGRADES, URETEROSCOPY, LASER HOLMIUM LITHOTRIPSY URETER(S), INSERT STENT Left 7/28/2016    Procedure: COMBINED CYSTOSCOPY, RETROGRADES, URETEROSCOPY, LASER HOLMIUM LITHOTRIPSY URETER(S), INSERT STENT;  Surgeon: Arnel Andrade MD;  Location:  OR     COMBINED CYSTOSCOPY, RETROGRADES, URETEROSCOPY, LASER HOLMIUM LITHOTRIPSY URETER(S), INSERT STENT Bilateral 9/9/2019    Procedure: Cystoscopy, bilateral retrograde pyelograms, interpretation of fluoroscopic images, bilateral ureteroscopy with holmium laser lithotripsy and stone basketing, placement of 5 x 26 double-J ureteral stents  bilaterally;  Surgeon: Arnel Andrade MD;  Location: [facility] OR     CYSTOSCOPY       GENITOURINARY SURGERY  1980's    shock wave lithotripsy       FAMILY HISTORY: No family history on file.    SOCIAL HISTORY:   Social History     Socioeconomic History     Marital status:      Spouse name: None     Number of children: None     Years of education: None     Highest education level: None   Occupational History     None   Tobacco Use     Smoking status: Never Smoker     Smokeless tobacco: Never Used   Substance and Sexual Activity     Alcohol use: Yes     Comment: 8-10 drinks per  week     Drug use: No     Sexual activity: None   Other Topics Concern     Parent/sibling w/ CABG, MI or angioplasty before 65F 55M? Not Asked   Social History Narrative     None     Social Determinants of Health     Financial Resource Strain:      Difficulty of Paying Living Expenses:    Food Insecurity:      Worried About Running Out of Food in the Last Year:      Ran Out of Food in the Last Year:    Transportation Needs:      Lack of Transportation (Medical):      Lack of Transportation (Non-Medical):    Physical Activity:      Days of Exercise per Week:      Minutes of Exercise per Session:    Stress:      Feeling of Stress :    Social Connections:      Frequency of Communication with Friends and Family:      Frequency of Social Gatherings with Friends and Family:      Attends Christian Services:      Active Member of Clubs or Organizations:      Attends Club or Organization Meetings:      Marital Status:    Intimate Partner Violence:      Fear of Current or Ex-Partner:      Emotionally Abused:      Physically Abused:      Sexually Abused:        Review Of Systems:  Skin: No rash, pruritis, or skin pigmentation  Eyes: No changes in vision  Ears/Nose/Throat: No changes in hearing, no nosebleeds  Respiratory: No shortness of breath, dyspnea on exertion, cough, or hemoptysis  Cardiovascular: No chest pain or  palpitations  Gastrointestinal: No diarrhea or constipation. No abdominal pain. No hematochezia  Genitourinary: see HPI  Musculoskeletal: No pain or swelling of joints, normal range of motion  Neurologic: No weakness or tremors  Psychiatric: No recent changes in memory or mood  Hematologic/Lymphatic/Immunologic: No easy bruising or enlarged lymph nodes  Endocrine: No weight gain or loss      PHYSICAL EXAM:    General: Alert and oriented to time, place, and self. In NAD   Lungs: no respiratory distress or labored breathing  Neuro: Alert, oriented, speech and mentation normal   Psych: affect and mood normal      Imaging: None    Urinalysis: UA RESULTS:  Recent Labs   Lab Test 05/28/16  1424   COLOR Light Yellow   APPEARANCE Clear   URINEGLC Negative   URINEBILI Negative   URINEKETONE Negative   SG 1.003   UBLD Moderate*   URINEPH 6.5   PROTEIN Negative   NITRITE Negative   LEUKEST Negative   RBCU 26*   WBCU <1       PSA: 4.66    Post Void Residual:     Other labs: None today      IMPRESSION:  Right kidney stone, elevated PSA    PLAN:  He successfully passed his ureteral stone.  He has a remaining right kidney stone.  He has had both uric acid and calcium oxalate stones before.  He has not been using his potassium citrate recently but he has been using lemon juice.  Since his remaining stone may be composed of uric acid I recommended that he start taking potassium citrate 20 meq 3 times daily.  I wrote the prescription for him.  I will see him back in 1 month with a repeat CT scan to see if the stone is dissolving.    We also discussed his elevated PSA.  We need to recheck his PSA as well as a urinalysis when he sees me again in a month.      Arnel Andrade M.D.              Total telephone time: 16 minutes

## 2021-09-20 NOTE — LETTER
9/20/2021       RE: Magdaleno Marie  [id number]  [place] [address]     Dear Colleague,    Thank you for referring your patient, Magdaleno Marie, to the [facility] [place] at [facility]. Please see a copy of my visit note below.    MAGDALENO is a 65 year old who is being evaluated via a billable video visit.      How would you like to obtain your AVS? [employer]  If the video visit is dropped, the invitation should be resent by: Other e-mail: Video[employer]  Will anyone else be joining your video visit? No         Office Visit Note  [facility]  [phone]    UROLOGIC DIAGNOSES:   History of uric acid and calcium kidney stones  Elevated PSA    CURRENT INTERVENTIONS:   Potassium citrate    HISTORY:   Magdaleno had recurrence of hematuria on Thursday next week along with right sided abdominal pain. He had CT scan on Friday and it showed a 4-5mm proximal left ureteral stone. He passed the stone on Sunday. His pain resolved immediately.  He currently feels well.  He had another 7 mm stone in the right kidney.    I had also noted on my chart that his PSA was high last year.  His PSA was 4.66 at his annual physical.  This was his first PSA elevation.  The patient was not aware of this.  He has no urinary symptoms or complaints.    Patient unable to connect to my chart so this ended up being a telephone visit    PAST MEDICAL HISTORY:   Past Medical History:   Diagnosis Date     Calculus of kidney     kidney stones     Hypertension        PAST SURGICAL HISTORY:   Past Surgical History:   Procedure Laterality Date     COLONOSCOPY       COMBINED CYSTOSCOPY, RETROGRADES, URETEROSCOPY, LASER HOLMIUM LITHOTRIPSY URETER(S), INSERT STENT Left 7/28/2016    Procedure: COMBINED CYSTOSCOPY, RETROGRADES, URETEROSCOPY, LASER HOLMIUM LITHOTRIPSY URETER(S), INSERT STENT;  Surgeon: Arnel Andrade MD;  Location:  OR     [facility]  CYSTOSCOPY, RETROGRADES, URETEROSCOPY, LASER HOLMIUM LITHOTRIPSY URETER(S), INSERT STENT Bilateral 9/9/2019    Procedure: Cystoscopy, bilateral retrograde pyelograms, interpretation of fluoroscopic images, bilateral ureteroscopy with holmium laser lithotripsy and stone basketing, placement of 5 x 26 double-J ureteral stents bilaterally;  Surgeon: Arnel Andrade MD;  Location: [facility] OR     CYSTOSCOPY       GENITOURINARY SURGERY  1980's    shock wave lithotripsy       FAMILY HISTORY: No family history on file.    SOCIAL HISTORY:   Social History     Socioeconomic History     Marital status:      Spouse name: None     Number of children: None     Years of education: None     Highest education level: None   Occupational History     None   Tobacco Use     Smoking status: Never Smoker     Smokeless tobacco: Never Used   Substance and Sexual Activity     Alcohol use: Yes     Comment: 8-10 drinks per  week     Drug use: No     Sexual activity: None   Other Topics Concern     Parent/sibling w/ CABG, MI or angioplasty before 65F 55M? Not Asked   Social History Narrative     None     Social Determinants of Health     Financial Resource Strain:      Difficulty of Paying Living Expenses:    Food Insecurity:      Worried About Running Out of Food in the Last Year:      Ran Out of Food in the Last Year:    Transportation Needs:      Lack of Transportation (Medical):      Lack of Transportation (Non-Medical):    Physical Activity:      Days of Exercise per Week:      Minutes of Exercise per Session:    Stress:      Feeling of Stress :    Social Connections:      Frequency of Communication with Friends and Family:      Frequency of Social Gatherings with Friends and Family:      Attends Quaker Services:      Active Member of Clubs or Organizations:      Attends Club or Organization Meetings:      Marital Status:    Intimate Partner Violence:      Fear of Current or Ex-Partner:      Emotionally Abused:      Physically  Abused:      Sexually Abused:      Review Of Systems:  Skin: No rash, pruritis, or skin pigmentation  Eyes: No changes in vision  Ears/Nose/Throat: No changes in hearing, no nosebleeds  Respiratory: No shortness of breath, dyspnea on exertion, cough, or hemoptysis  Cardiovascular: No chest pain or palpitations  Gastrointestinal: No diarrhea or constipation. No abdominal pain. No hematochezia  Genitourinary: see HPI  Musculoskeletal: No pain or swelling of joints, normal range of motion  Neurologic: No weakness or tremors  Psychiatric: No recent changes in memory or mood  Hematologic/Lymphatic/Immunologic: No easy bruising or enlarged lymph nodes  Endocrine: No weight gain or loss      PHYSICAL EXAM:    General: Alert and oriented to time, place, and self. In NAD   Lungs: no respiratory distress or labored breathing  Neuro: Alert, oriented, speech and mentation normal   Psych: affect and mood normal    Imaging: None    Urinalysis: UA RESULTS:  Recent Labs   Lab Test 05/28/16  1424   COLOR Light Yellow   APPEARANCE Clear   URINEGLC Negative   URINEBILI Negative   URINEKETONE Negative   SG 1.003   UBLD Moderate*   URINEPH 6.5   PROTEIN Negative   NITRITE Negative   LEUKEST Negative   RBCU 26*   WBCU <1     PSA: 4.66    Post Void Residual:     Other labs: None today    IMPRESSION:  Right kidney stone, elevated PSA    PLAN:  He successfully passed his ureteral stone.  He has a remaining right kidney stone.  He has had both uric acid and calcium oxalate stones before.  He has not been using his potassium citrate recently but he has been using lemon juice.  Since his remaining stone may be composed of uric acid I recommended that he start taking potassium citrate 20 meq 3 times daily.  I wrote the prescription for him.  I will see him back in 1 month with a repeat CT scan to see if the stone is dissolving.    We also discussed his elevated PSA.  We need to recheck his PSA as well as a urinalysis when he sees me again in a  month.    Arnel Andrade M.D.     Total telephone time: 16 minutes

## 2021-09-21 PROCEDURE — 82365 CALCULUS SPECTROSCOPY: CPT | Mod: 90

## 2021-09-24 LAB
APPEARANCE STONE: NORMAL
COMPN STONE: NORMAL
NUMBER STONE: 2
SIZE STONE: NORMAL MM
SPECIMEN WT: 8 MG

## 2021-10-25 ENCOUNTER — HOSPITAL ENCOUNTER (OUTPATIENT)
Dept: CT IMAGING | Facility: CLINIC | Age: 66
Discharge: HOME | End: 2021-10-25
Attending: UROLOGY | Admitting: UROLOGY
Payer: COMMERCIAL

## 2021-10-25 DIAGNOSIS — N20.0 KIDNEY STONE: ICD-10-CM

## 2021-10-25 PROCEDURE — 74176 CT ABD & PELVIS W/O CONTRAST: CPT

## 2021-11-02 ENCOUNTER — OFFICE VISIT (OUTPATIENT)
Dept: UROLOGY | Facility: CLINIC | Age: 66
End: 2021-11-02
Payer: COMMERCIAL

## 2021-11-02 VITALS
WEIGHT: 225 LBS | BODY MASS INDEX: 31.5 KG/M2 | HEIGHT: 71 IN | SYSTOLIC BLOOD PRESSURE: 140 MMHG | OXYGEN SATURATION: 97 % | DIASTOLIC BLOOD PRESSURE: 80 MMHG | HEART RATE: 84 BPM

## 2021-11-02 DIAGNOSIS — N20.0 KIDNEY STONE: Primary | ICD-10-CM

## 2021-11-02 DIAGNOSIS — N20.0 KIDNEY STONE: ICD-10-CM

## 2021-11-02 DIAGNOSIS — R97.20 ELEVATED PROSTATE SPECIFIC ANTIGEN (PSA): Primary | ICD-10-CM

## 2021-11-02 LAB
ALBUMIN UR-MCNC: NEGATIVE MG/DL
APPEARANCE UR: CLEAR
BILIRUB UR QL STRIP: NEGATIVE
COLOR UR AUTO: YELLOW
GLUCOSE UR STRIP-MCNC: NEGATIVE MG/DL
HGB UR QL STRIP: NEGATIVE
KETONES UR STRIP-MCNC: NEGATIVE MG/DL
LEUKOCYTE ESTERASE UR QL STRIP: NEGATIVE
NITRATE UR QL: NEGATIVE
PH UR STRIP: 7 [PH] (ref 5–7)
PSA SERPL-MCNC: 9.3 UG/L (ref 0–4)
RESULT: 19
SP GR UR STRIP: 1.02 (ref 1–1.03)
UROBILINOGEN UR STRIP-ACNC: 0.2 E.U./DL

## 2021-11-02 PROCEDURE — 51798 US URINE CAPACITY MEASURE: CPT | Performed by: UROLOGY

## 2021-11-02 PROCEDURE — 84153 ASSAY OF PSA TOTAL: CPT | Performed by: UROLOGY

## 2021-11-02 PROCEDURE — 36415 COLL VENOUS BLD VENIPUNCTURE: CPT | Performed by: UROLOGY

## 2021-11-02 PROCEDURE — 99214 OFFICE O/P EST MOD 30 MIN: CPT | Mod: 25 | Performed by: UROLOGY

## 2021-11-02 PROCEDURE — 81003 URINALYSIS AUTO W/O SCOPE: CPT | Mod: QW | Performed by: UROLOGY

## 2021-11-02 ASSESSMENT — PAIN SCALES - GENERAL: PAINLEVEL: NO PAIN (0)

## 2021-11-02 ASSESSMENT — MIFFLIN-ST. JEOR: SCORE: 1812.72

## 2021-11-02 NOTE — NURSING NOTE
Chief Complaint   Patient presents with     Elevated PSA     Kidney stone     UA,PSA     Maryuri Nicole

## 2021-11-02 NOTE — LETTER
11/2/2021       RE: Magdaleno Marie  [address]  [place] [address]     Dear Colleague,    Thank you for referring your patient, Magdaleno Marie, to the [facility] [place] at [facility]. Please see a copy of my visit note below.    Office Visit Note  [facility]  [phone]    UROLOGIC DIAGNOSES:   History of uric acid and calcium kidney stones  Elevated PSA    CURRENT INTERVENTIONS:   Potassium citrate    HISTORY:   Magdaleno returns to clinic today for follow-up on kidney stones as well as elevated PSA.  He has been taking potassium citrate again. He has been taking it 3 times daily. He had a CT scan repeated last week.  Stone in the lower pole of the right kidney appears unchanged. He has no symptoms or complaints at this time. He does have some concerns about upcoming travel in February.    His PSA today is significantly elevated at 9.3. He has no urinary symptoms or complaints at this time.      PAST MEDICAL HISTORY:   Past Medical History:   Diagnosis Date     Calculus of kidney     kidney stones     Hypertension        PAST SURGICAL HISTORY:   Past Surgical History:   Procedure Laterality Date     COLONOSCOPY       COMBINED CYSTOSCOPY, RETROGRADES, URETEROSCOPY, LASER HOLMIUM LITHOTRIPSY URETER(S), INSERT STENT Left 7/28/2016    Procedure: COMBINED CYSTOSCOPY, RETROGRADES, URETEROSCOPY, LASER HOLMIUM LITHOTRIPSY URETER(S), INSERT STENT;  Surgeon: Arnel Andrade MD;  Location:  OR     COMBINED CYSTOSCOPY, RETROGRADES, URETEROSCOPY, LASER HOLMIUM LITHOTRIPSY URETER(S), INSERT STENT Bilateral 9/9/2019    Procedure: Cystoscopy, bilateral retrograde pyelograms, interpretation of fluoroscopic images, bilateral ureteroscopy with holmium laser lithotripsy and stone basketing, placement of 5 x 26 double-J ureteral stents bilaterally;  Surgeon: Arnel Andrade MD;  Location:  OR     CYSTOSCOPY        GENITOURINARY SURGERY  1980's    shock wave lithotripsy       FAMILY HISTORY: History reviewed. No pertinent family history.    SOCIAL HISTORY:   Social History     Socioeconomic History     Marital status:      Spouse name: None     Number of children: None     Years of education: None     Highest education level: None   Occupational History     None   Tobacco Use     Smoking status: Never Smoker     Smokeless tobacco: Never Used   Substance and Sexual Activity     Alcohol use: Yes     Comment: 8-10 drinks per  week     Drug use: No     Sexual activity: None   Other Topics Concern     Parent/sibling w/ CABG, MI or angioplasty before 65F 55M? Not Asked   Social History Narrative     None     Social Determinants of Health     Financial Resource Strain:      Difficulty of Paying Living Expenses:    Food Insecurity:      Worried About Running Out of Food in the Last Year:      Ran Out of Food in the Last Year:    Transportation Needs:      Lack of Transportation (Medical):      Lack of Transportation (Non-Medical):    Physical Activity:      Days of Exercise per Week:      Minutes of Exercise per Session:    Stress:      Feeling of Stress :    Social Connections:      Frequency of Communication with Friends and Family:      Frequency of Social Gatherings with Friends and Family:      Attends [employer]ze Services:      Active Member of Clubs or Organizations:      Attends Club or Organization Meetings:      Marital Status:    Intimate Partner Violence:      Fear of Current or Ex-Partner:      Emotionally Abused:      Physically Abused:      Sexually Abused:        Review Of Systems:  Skin: No rash, pruritis, or skin pigmentation  Eyes: No changes in vision  Ears/Nose/Throat: No changes in hearing, no nosebleeds  Respiratory: No shortness of breath, dyspnea on exertion, cough, or hemoptysis  Cardiovascular: No chest pain or palpitations  Gastrointestinal: No diarrhea or constipation. No abdominal pain. No  "hematochezia  Genitourinary: see HPI  Musculoskeletal: No pain or swelling of joints, normal range of motion  Neurologic: No weakness or tremors  Psychiatric: No recent changes in memory or mood  Hematologic/Lymphatic/Immunologic: No easy bruising or enlarged lymph nodes  Endocrine: No weight gain or loss      PHYSICAL EXAM:    BP (!) 140/80   Pulse 84   Ht 1.803 m (5' 11\")   Wt 102.1 kg (225 lb)   SpO2 97%   BMI 31.38 kg/m      Constitutional: Well developed. Conversant and in no acute distress  Eyes: Anicteric sclera, conjunctiva clear, normal extraocular movements  ENT: Normocephalic and atraumatic,   Skin: Warm and dry. No rashes or lesions  Cardiac: No peripheral edema  Back/Flank: Not done  CNS/PNS: Normal musculature and movements, moves all extremities normally  Respiratory: Normal non-labored breathing  Abdomen: Soft nontender and nondistended  Peripheral Vascular: No peripheral edema  Mental Status/Psych: Alert and Oriented x 3. Normal mood and affect    Penis: Normal  Scrotal skin: Normal, no lesions  Testicles: Normal to palpation bilaterally  Epididymis: Normal to palpation bilaterally  Lymphatic: Normal inguinal lymph nodes    Digital Rectal Exam: The prostate is only slightly enlarged, benign and symmetric to palpation    Cystoscopy: Not done    Imaging: None    Urinalysis: UA RESULTS:  Recent Labs   Lab Test 11/02/21  1353 05/28/16  1424   COLOR Yellow Light Yellow   APPEARANCE Clear Clear   URINEGLC Negative Negative   URINEBILI Negative Negative   URINEKETONE Negative Negative   SG 1.025 1.003   UBLD Negative Moderate*   URINEPH 7.0 6.5   PROTEIN Negative Negative   UROBILINOGEN 0.2  --    NITRITE Negative Negative   LEUKEST Negative Negative   RBCU  --  26*   WBCU  --  <1       PSA: 9.3    Post Void Residual: 19mL    Other labs: None today      IMPRESSION:  Right kidney stone, elevated PSA    PLAN:  We discussed both the issue of his kidney stone and his elevated PSA.    For his kidney " stones, he has been taking the potassium citrate 3 times daily and the pH of his urine is 7.0 today. Despite this the stones on the right side appear unchanged. We discussed options of observation versus ureteroscopy versus shockwave lithotripsy. He has had both ureteroscopy and shockwave lithotripsy in the past and he would prefer to have shockwave lithotripsy for these stones if possible. He is quite bothered by ureteral stents. We discussed both options. I counseled him that he would need to have a KUB to see if the stones are radiopaque first before considering shockwave lithotripsy. He would like to do this.    We discussed his elevated PSA. His PSA is elevated significantly, I do not see any obvious reasons for false elevations such as recent ejaculation, recent bike ride, or infection or prostatitis. I recommended an evaluation for a potential prostate cancer. I recommended we begin his evaluation with an MRI of the prostate. He wishes to proceed.    He also asked about prevention methods for kidney stones. These were discussed today. He would also like a referral to the kidney stone prevention clinic at the [facility]. At this time I recommended he continue with the potassium citrate, but reduce dose to 20meq twice daily.    I will contact him when the KUB results are available and I will also contact him when the MRI results are available so that we can discuss both results.    Total time spent today in review of outside records and test results, discussion with the patient, and documentation: 30 minutes      Arnel Andrade M.D.

## 2021-11-19 ENCOUNTER — ANCILLARY PROCEDURE (OUTPATIENT)
Dept: MRI IMAGING | Facility: CLINIC | Age: 66
End: 2021-11-19
Attending: UROLOGY
Payer: COMMERCIAL

## 2021-11-19 ENCOUNTER — ANCILLARY PROCEDURE (OUTPATIENT)
Dept: GENERAL RADIOLOGY | Facility: CLINIC | Age: 66
End: 2021-11-19
Attending: UROLOGY
Payer: COMMERCIAL

## 2021-11-19 DIAGNOSIS — N20.0 KIDNEY STONE: ICD-10-CM

## 2021-11-19 DIAGNOSIS — R97.20 ELEVATED PROSTATE SPECIFIC ANTIGEN (PSA): ICD-10-CM

## 2021-11-19 PROCEDURE — 74019 RADEX ABDOMEN 2 VIEWS: CPT | Performed by: RADIOLOGY

## 2021-11-19 PROCEDURE — A9585 GADOBUTROL INJECTION: HCPCS | Performed by: STUDENT IN AN ORGANIZED HEALTH CARE EDUCATION/TRAINING PROGRAM

## 2021-11-19 PROCEDURE — 72197 MRI PELVIS W/O & W/DYE: CPT | Mod: 26 | Performed by: STUDENT IN AN ORGANIZED HEALTH CARE EDUCATION/TRAINING PROGRAM

## 2021-11-19 RX ORDER — GADOBUTROL 604.72 MG/ML
10 INJECTION INTRAVENOUS ONCE
Status: COMPLETED | OUTPATIENT
Start: 2021-11-19 | End: 2021-11-19

## 2021-11-19 RX ORDER — GADOBUTROL 604.72 MG/ML
10 INJECTION INTRAVENOUS ONCE
Status: DISCONTINUED | OUTPATIENT
Start: 2021-11-19 | End: 2021-11-19

## 2021-11-19 RX ADMIN — GADOBUTROL 10 ML: 604.72 INJECTION INTRAVENOUS at 11:50

## 2021-11-19 NOTE — DISCHARGE INSTRUCTIONS
MRI Contrast Discharge Instructions    The IV contrast you received today will pass out of your body in your  urine. This will happen in the next 24 hours. You will not feel this process.  Your urine will not change color.    Drink at least 4 extra glasses of water or juice today (unless your doctor  has restricted your fluids). This reduces the stress on your kidneys.  You may take your regular medicines.    If you are on dialysis: It is best to have dialysis today.    If you have a reaction: Most reactions happen right away. If you have  any new symptoms after leaving the hospital (such as hives or swelling),  call your hospital at the correct number below. Or call your family doctor.  If you have breathing distress or wheezing, call 911.    Special instructions: ***    I have read and understand the above information.    Signature:______________________________________ Date:___________    Staff:__________________________________________ Date:___________     Time:__________    [place] [facility] Departments:    ___[place]: [phone]  ___[facility]: [phone]  ___[place]: [phone] ___[facility]: [phone]  ___[facility]: [phone]  ___[facility]: [phone]  ___[phone]  ___[facility]: [phone]  ___[phone]

## 2021-12-01 ENCOUNTER — TELEPHONE (OUTPATIENT)
Dept: SURGERY | Facility: CLINIC | Age: 66
End: 2021-12-01
Payer: COMMERCIAL

## 2021-12-01 NOTE — TELEPHONE ENCOUNTER
Unable to reach patient by phone after multiple attempts over the past 3 days to discuss test results    I will have my office try to contact him to make an appointment

## 2021-12-02 ENCOUNTER — OFFICE VISIT (OUTPATIENT)
Dept: UROLOGY | Facility: CLINIC | Age: 66
End: 2021-12-02
Payer: COMMERCIAL

## 2021-12-02 VITALS
HEART RATE: 96 BPM | BODY MASS INDEX: 31.5 KG/M2 | WEIGHT: 225 LBS | DIASTOLIC BLOOD PRESSURE: 82 MMHG | HEIGHT: 71 IN | OXYGEN SATURATION: 97 % | SYSTOLIC BLOOD PRESSURE: 140 MMHG

## 2021-12-02 DIAGNOSIS — N40.0 ENLARGED PROSTATE: ICD-10-CM

## 2021-12-02 DIAGNOSIS — R97.20 ELEVATED PROSTATE SPECIFIC ANTIGEN (PSA): ICD-10-CM

## 2021-12-02 DIAGNOSIS — N20.0 NEPHROLITHIASIS: ICD-10-CM

## 2021-12-02 DIAGNOSIS — N20.0 KIDNEY STONE: Primary | ICD-10-CM

## 2021-12-02 PROCEDURE — 99214 OFFICE O/P EST MOD 30 MIN: CPT | Performed by: UROLOGY

## 2021-12-02 RX ORDER — LEVOFLOXACIN 500 MG/1
500 TABLET, FILM COATED ORAL DAILY
Qty: 3 TABLET | Refills: 0 | Status: ON HOLD | OUTPATIENT
Start: 2021-12-02 | End: 2022-01-21

## 2021-12-02 ASSESSMENT — MIFFLIN-ST. JEOR: SCORE: 1812.72

## 2021-12-02 ASSESSMENT — PAIN SCALES - GENERAL: PAINLEVEL: NO PAIN (0)

## 2021-12-02 NOTE — NURSING NOTE
Chief Complaint   Patient presents with     Elevated PSA     Discuss MRI of prostate   Kristine Zuñiga LPN

## 2021-12-02 NOTE — LETTER
12/2/2021       RE: Magdaleno Marie  [address]  [place] [address]     Dear Colleague,    Thank you for referring your patient, Magdaleno Marie, to the [facility] [place] at [facility]. Please see a copy of my visit note below.    Office Visit Note  [facility]  [phone]    UROLOGIC DIAGNOSES:   Right kidney stones, elevated PSA, enlarged prostate    CURRENT INTERVENTIONS:   Potassium citrate    HISTORY:   Magdaleno returns to clinic today for review of both his KUB and MRI prostate results.  His stone is not visible on KUB.  His MRI of the prostate showed 4 lesions concerning for prostate cancer, all were rated as PI-RADS 4, the prostate measured 40 g      PAST MEDICAL HISTORY:   Past Medical History:   Diagnosis Date     Calculus of kidney     kidney stones     Hypertension        PAST SURGICAL HISTORY:   Past Surgical History:   Procedure Laterality Date     COLONOSCOPY       COMBINED CYSTOSCOPY, RETROGRADES, URETEROSCOPY, LASER HOLMIUM LITHOTRIPSY URETER(S), INSERT STENT Left 7/28/2016    Procedure: COMBINED CYSTOSCOPY, RETROGRADES, URETEROSCOPY, LASER HOLMIUM LITHOTRIPSY URETER(S), INSERT STENT;  Surgeon: Arnel Andrade MD;  Location: [facility] OR     COMBINED CYSTOSCOPY, RETROGRADES, URETEROSCOPY, LASER HOLMIUM LITHOTRIPSY URETER(S), INSERT STENT Bilateral 9/9/2019    Procedure: Cystoscopy, bilateral retrograde pyelograms, interpretation of fluoroscopic images, bilateral ureteroscopy with holmium laser lithotripsy and stone basketing, placement of 5 x 26 double-J ureteral stents bilaterally;  Surgeon: Arnel Andrade MD;  Location:  OR     CYSTOSCOPY       GENITOURINARY SURGERY  1980's    shock wave lithotripsy       FAMILY HISTORY: History reviewed. No pertinent family history.    SOCIAL HISTORY:   Social History     Socioeconomic History     Marital status:      Spouse name: None      "Number of children: None     Years of education: None     Highest education level: None   Occupational History     None   Tobacco Use     Smoking status: Never Smoker     Smokeless tobacco: Never Used   Substance and Sexual Activity     Alcohol use: Yes     Comment: 8-10 drinks per  week     Drug use: No     Sexual activity: Yes   Other Topics Concern     Parent/sibling w/ CABG, MI or angioplasty before 65F 55M? Not Asked   Social History Narrative     None     Social Determinants of Health     Financial Resource Strain: Not on file   Food Insecurity: Not on file   Transportation Needs: Not on file   Physical Activity: Not on file   Stress: Not on file   Social Connections: Not on file   Intimate Partner Violence: Not on file   Housing Stability: Not on file       Review Of Systems:  Skin: No rash, pruritis, or skin pigmentation  Eyes: No changes in vision  Ears/Nose/Throat: No changes in hearing, no nosebleeds  Respiratory: No shortness of breath, dyspnea on exertion, cough, or hemoptysis  Cardiovascular: No chest pain or palpitations  Gastrointestinal: No diarrhea or constipation. No abdominal pain. No hematochezia  Genitourinary: see HPI  Musculoskeletal: No pain or swelling of joints, normal range of motion  Neurologic: No weakness or tremors  Psychiatric: No recent changes in memory or mood  Hematologic/Lymphatic/Immunologic: No easy bruising or enlarged lymph nodes  Endocrine: No weight gain or loss      PHYSICAL EXAM:    BP (!) 140/82 (BP Location: Left arm, Patient Position: Sitting, Cuff Size: Adult Regular)   Pulse 96   Ht 1.803 m (5' 11\")   Wt 102.1 kg (225 lb)   SpO2 97%   BMI 31.38 kg/m      Constitutional: Well developed. Conversant and in no acute distress  Eyes: Anicteric sclera, conjunctiva clear, normal extraocular movements  ENT: Normocephalic and atraumatic,   Skin: Warm and dry. No rashes or lesions  Cardiac: No peripheral edema  Back/Flank: Not done  CNS/PNS: Normal musculature and " movements, moves all extremities normally  Respiratory: Normal non-labored breathing  Abdomen: Soft nontender and nondistended  Peripheral Vascular: No peripheral edema  Mental Status/Psych: Alert and Oriented x 3. Normal mood and affect    Penis: Not done  Scrotal Skin: Not done  Testicles: Not done  Epididymis: Not done  Digital Rectal Exam:     Cystoscopy: Not done    Imaging: None    Urinalysis: UA RESULTS:  Recent Labs   Lab Test 11/02/21  1353 05/28/16  1424   COLOR Yellow Light Yellow   APPEARANCE Clear Clear   URINEGLC Negative Negative   URINEBILI Negative Negative   URINEKETONE Negative Negative   SG 1.025 1.003   UBLD Negative Moderate*   URINEPH 7.0 6.5   PROTEIN Negative Negative   UROBILINOGEN 0.2  --    NITRITE Negative Negative   LEUKEST Negative Negative   RBCU  --  26*   WBCU  --  <1       PSA: 9.3    Post Void Residual:     Other labs: None today      IMPRESSION:  Elevated PSA, enlarged prostate, right kidney stone    PLAN:  We first discussed his right kidney stone.  He has a kidney stone seen on CT scan but we cannot see the stone on KUB.  Therefore, shockwave lithotripsy would not be an option.  He has tolerated stents poorly in the past.  We discussed his options of observation versus proceeding to the operating room for ureteroscopy with laser lithotripsy and stone basketing, although this option would likely require stent placement for at least 5 to 7 days postoperatively.  He wishes to proceed with surgery.  He will schedule this on an elective basis as an outpatient.    We discussed his MRI results.  The MRI is concerning for the presence of a prostate cancer.  I recommended an MRI fusion prostate biopsy.  We discussed prostate biopsy in detail today along with its risks, including bleeding and infection.  All of his questions were answered.  He wishes to proceed.  Levaquin was prescribed for prophylaxis for his biopsy.        Arnel Andrade M.D.

## 2021-12-02 NOTE — PROGRESS NOTES
Office Visit Note  [facility]  [phone]    UROLOGIC DIAGNOSES:   Right kidney stones, elevated PSA, enlarged prostate    CURRENT INTERVENTIONS:   Potassium citrate    HISTORY:   Magdaleno returns to clinic today for review of both his KUB and MRI prostate results.  His stone is not visible on KUB.  His MRI of the prostate showed 4 lesions concerning for prostate cancer, all were rated as PI-RADS 4, the prostate measured 40 g      PAST MEDICAL HISTORY:   Past Medical History:   Diagnosis Date     Calculus of kidney     kidney stones     Hypertension        PAST SURGICAL HISTORY:   Past Surgical History:   Procedure Laterality Date     COLONOSCOPY       COMBINED CYSTOSCOPY, RETROGRADES, URETEROSCOPY, LASER HOLMIUM LITHOTRIPSY URETER(S), INSERT STENT Left 7/28/2016    Procedure: COMBINED CYSTOSCOPY, RETROGRADES, URETEROSCOPY, LASER HOLMIUM LITHOTRIPSY URETER(S), INSERT STENT;  Surgeon: Arnel Andrade MD;  Location: [facility]     COMBINED CYSTOSCOPY, RETROGRADES, URETEROSCOPY, LASER HOLMIUM LITHOTRIPSY URETER(S), INSERT STENT Bilateral 9/9/2019    Procedure: Cystoscopy, bilateral retrograde pyelograms, interpretation of fluoroscopic images, bilateral ureteroscopy with holmium laser lithotripsy and stone basketing, placement of 5 x 26 double-J ureteral stents bilaterally;  Surgeon: Arnel Andrade MD;  Location: [facility] OR     CYSTOSCOPY       GENITOURINARY SURGERY  1980's    shock wave lithotripsy       FAMILY HISTORY: History reviewed. No pertinent family history.    SOCIAL HISTORY:   Social History     Socioeconomic History     Marital status:      Spouse name: None     Number of children: None     Years of education: None     Highest education level: None   Occupational History     None   Tobacco Use     Smoking status: Never Smoker     Smokeless tobacco: Never Used   Substance and Sexual Activity     Alcohol use: Yes     Comment: 8-10 drinks per  week     Drug use: No     Sexual  "activity: Yes   Other Topics Concern     Parent/sibling w/ CABG, MI or angioplasty before 65F 55M? Not Asked   Social History Narrative     None     Social Determinants of Health     Financial Resource Strain: Not on file   Food Insecurity: Not on file   Transportation Needs: Not on file   Physical Activity: Not on file   Stress: Not on file   Social Connections: Not on file   Intimate Partner Violence: Not on file   Housing Stability: Not on file       Review Of Systems:  Skin: No rash, pruritis, or skin pigmentation  Eyes: No changes in vision  Ears/Nose/Throat: No changes in hearing, no nosebleeds  Respiratory: No shortness of breath, dyspnea on exertion, cough, or hemoptysis  Cardiovascular: No chest pain or palpitations  Gastrointestinal: No diarrhea or constipation. No abdominal pain. No hematochezia  Genitourinary: see HPI  Musculoskeletal: No pain or swelling of joints, normal range of motion  Neurologic: No weakness or tremors  Psychiatric: No recent changes in memory or mood  Hematologic/Lymphatic/Immunologic: No easy bruising or enlarged lymph nodes  Endocrine: No weight gain or loss      PHYSICAL EXAM:    BP (!) 140/82 (BP Location: Left arm, Patient Position: Sitting, Cuff Size: Adult Regular)   Pulse 96   Ht 1.803 m (5' 11\")   Wt 102.1 kg (225 lb)   SpO2 97%   BMI 31.38 kg/m      Constitutional: Well developed. Conversant and in no acute distress  Eyes: Anicteric sclera, conjunctiva clear, normal extraocular movements  ENT: Normocephalic and atraumatic,   Skin: Warm and dry. No rashes or lesions  Cardiac: No peripheral edema  Back/Flank: Not done  CNS/PNS: Normal musculature and movements, moves all extremities normally  Respiratory: Normal non-labored breathing  Abdomen: Soft nontender and nondistended  Peripheral Vascular: No peripheral edema  Mental Status/Psych: Alert and Oriented x 3. Normal mood and affect    Penis: Not done  Scrotal Skin: Not done  Testicles: Not done  Epididymis: Not " done  Digital Rectal Exam:     Cystoscopy: Not done    Imaging: None    Urinalysis: UA RESULTS:  Recent Labs   Lab Test 11/02/21  1353 05/28/16  1424   COLOR Yellow Light Yellow   APPEARANCE Clear Clear   URINEGLC Negative Negative   URINEBILI Negative Negative   URINEKETONE Negative Negative   SG 1.025 1.003   UBLD Negative Moderate*   URINEPH 7.0 6.5   PROTEIN Negative Negative   UROBILINOGEN 0.2  --    NITRITE Negative Negative   LEUKEST Negative Negative   RBCU  --  26*   WBCU  --  <1       PSA: 9.3    Post Void Residual:     Other labs: None today      IMPRESSION:  Elevated PSA, enlarged prostate, right kidney stone    PLAN:  We first discussed his right kidney stone.  He has a kidney stone seen on CT scan but we cannot see the stone on KUB.  Therefore, shockwave lithotripsy would not be an option.  He has tolerated stents poorly in the past.  We discussed his options of observation versus proceeding to the operating room for ureteroscopy with laser lithotripsy and stone basketing, although this option would likely require stent placement for at least 5 to 7 days postoperatively.  He wishes to proceed with surgery.  He will schedule this on an elective basis as an outpatient.    We discussed his MRI results.  The MRI is concerning for the presence of a prostate cancer.  I recommended an MRI fusion prostate biopsy.  We discussed prostate biopsy in detail today along with its risks, including bleeding and infection.  All of his questions were answered.  He wishes to proceed.  Levaquin was prescribed for prophylaxis for his biopsy.        Arnel Andrade M.D.

## 2021-12-02 NOTE — PATIENT INSTRUCTIONS
Ultrasound Guided Prostate Biopsy    What is a prostate biopsy? A prostate biopsy is a relatively painless procedure performed in the physician's office, outpatient facility or hospital.  A long, thin needle is inserted into the prostate to collect a sample of tissue from the prostate.  These samples are then examined by a pathologist for abnormal cells.    Why do I need a prostate biopsy? During a man's lifetime the prostate gradually increases in size.  The patient may or may not experience symptoms.  Symptoms of an enlarged prostate include:    Increased frequency of urination    Decreased force of urinary stream    Trouble with urination    Awakening at night to urinate    When the prostate is examined, the physician may feel a nodule (hard or firm growth) which would require a biopsy.    Another reason for having a prostate biopsy is an increase in the prostate specific androgen (PSA) in your blood.  A prostate biopsy may be necessary to determine the cause of the increased PSA.    Your physician will also perform an ultrasound (an image created by sound waves).  The ultrasound is performed by placing a small probe in the rectum to image the prostate gland.    Preparation for the Prostate Biopsy    1. Blood thinning medications must be stopped prior to your biopsy.  Please stop the following medication the appropriate number of days before:  a. 10 days-acetylsalicylic acid, vitamin E, fish oil, aspirin, baby aspirin  b. 7 days- Plavix, Brilinta, ibuprofen (Advil, Motrin, Aleve)  c. 5 days-Pradaxa  d. 4 days-Coumadin/warfarin  e. 3 days-Eliquis, Xarelto    2.  If you have any bleeding problems (thin blood), please tell your doctor.    3.  If you have been told by another doctor to take antibiotics before dental work or surgery, please tell the doctor.  This is common for patients that have had a joint replacement.    YOU HAVE BEEN PRESCRIBED AN ANTIBIOTIC.  You will start this medication the day before your  biopsy. It is one pill, twice a day.  You will continue taking the medication until it is gone.    The day of the biopsy you will be asked to use an enema one hour before you leave for your appointment.    PLEASE EAT YOUR REGULAR MEALS ON THE DAY OF YOUR BIOPSY.    Unless you have been prescribed a sedative (Valium or a similar drug) you will be able to drive to and from your appointment.  If you have taken a sedative you must have a ride.    AFTER THE BIOPSY    DANGER SIGNS    Small amount of blood in the urine for 10-14 days Excessive blood in the urine, stool or ejaculate  Small amount of blood in the stool for 48 hours Fever over 100 or chills  Small amount of blood in the ejaculate for up to Frequent urination or burning when you urinate   4 weeks          CALL THE DOCTOR'S OFFICE [phone] IF YOU HAVE ANY OF THESE SYMPTOMS.  IF YOU CANNOT CONTACT THE OFFICE, GO TO THE EMERGENCY ROOM.          Your biopsy is scheduled on____12/16/21_________ at our [place] office.  Please be at the office     at ___8:00AM____.     A follow up visit has been scheduled for you on___12/27/21_______@___8:45AM______ . This is     a virutal visit.  Your doctor will discuss your results with you at this visit.

## 2021-12-16 ENCOUNTER — OFFICE VISIT (OUTPATIENT)
Dept: UROLOGY | Facility: CLINIC | Age: 66
End: 2021-12-16
Payer: COMMERCIAL

## 2021-12-16 ENCOUNTER — ALLIED HEALTH/NURSE VISIT (OUTPATIENT)
Dept: UROLOGY | Facility: CLINIC | Age: 66
End: 2021-12-16
Payer: COMMERCIAL

## 2021-12-16 VITALS
HEIGHT: 71 IN | WEIGHT: 225 LBS | SYSTOLIC BLOOD PRESSURE: 120 MMHG | BODY MASS INDEX: 31.5 KG/M2 | DIASTOLIC BLOOD PRESSURE: 70 MMHG | OXYGEN SATURATION: 97 % | HEART RATE: 77 BPM

## 2021-12-16 DIAGNOSIS — R97.20 ELEVATED PROSTATE SPECIFIC ANTIGEN (PSA): Primary | ICD-10-CM

## 2021-12-16 DIAGNOSIS — Z79.2 PROPHYLACTIC ANTIBIOTIC: Primary | ICD-10-CM

## 2021-12-16 DIAGNOSIS — R97.20 ELEVATED PROSTATE SPECIFIC ANTIGEN (PSA): ICD-10-CM

## 2021-12-16 PROCEDURE — 88341 IMHCHEM/IMCYTCHM EA ADD ANTB: CPT | Performed by: PATHOLOGY

## 2021-12-16 PROCEDURE — 76942 ECHO GUIDE FOR BIOPSY: CPT | Performed by: UROLOGY

## 2021-12-16 PROCEDURE — 88305 TISSUE EXAM BY PATHOLOGIST: CPT | Performed by: PATHOLOGY

## 2021-12-16 PROCEDURE — 96372 THER/PROPH/DIAG INJ SC/IM: CPT | Mod: 59 | Performed by: UROLOGY

## 2021-12-16 PROCEDURE — 55700 PR BIOPSY OF PROSTATE,NEEDLE/PUNCH: CPT | Performed by: UROLOGY

## 2021-12-16 PROCEDURE — 88342 IMHCHEM/IMCYTCHM 1ST ANTB: CPT | Performed by: PATHOLOGY

## 2021-12-16 RX ORDER — LEVOFLOXACIN 500 MG/1
500 TABLET, FILM COATED ORAL DAILY
Qty: 1 TABLET | Refills: 0 | Status: SHIPPED | OUTPATIENT
Start: 2021-12-16 | End: 2021-12-17

## 2021-12-16 RX ORDER — GENTAMICIN 40 MG/ML
80 INJECTION, SOLUTION INTRAMUSCULAR; INTRAVENOUS ONCE
Status: COMPLETED | OUTPATIENT
Start: 2021-12-16 | End: 2021-12-16

## 2021-12-16 RX ADMIN — GENTAMICIN 80 MG: 40 INJECTION, SOLUTION INTRAMUSCULAR; INTRAVENOUS at 08:01

## 2021-12-16 ASSESSMENT — MIFFLIN-ST. JEOR: SCORE: 1812.72

## 2021-12-16 ASSESSMENT — PAIN SCALES - GENERAL: PAINLEVEL: NO PAIN (0)

## 2021-12-16 NOTE — NURSING NOTE
Chief Complaint   Patient presents with     Elevated PSA     Patient is here for Transrectal Ultrasound/MRI Guided Prostate Biopsies      Procedure was explained to patient prior to performing said procedure.  The patient signed the [facility] consent form and all questions were answered prior to the procedure.  Any pre-procedural antibiotics were given according to the performing physician's recommendation.  Patient reviewed information on the labels attached to samples and confirmed the accuracy of all of the labels.     Performing Physician:  Dr. Andrade  Antibiotic taken?  yes  Aspirin or other blood thinning medications discontinued 7-10 days:  yes  Time of enema:  6:30am  Patient given Gentamicin intramuscular injection 30 minutes prior to procedure  Yes       Vitals were repeated prior to patient leaving and instructions for post TRUS care were explained to the patient.

## 2021-12-16 NOTE — NURSING NOTE
"Chief Complaint   Patient presents with     Elevated PSA     Patient is here for Transrectal Ultrasound/MRI Guided Prostate Biopsies        Blood pressure 120/70, pulse 77, height 1.803 m (5' 11\"), weight 102.1 kg (225 lb), SpO2 97 %. Body mass index is 31.38 kg/m .    There is no problem list on file for this patient.      Allergies   Allergen Reactions     No Known Drug Allergies        Current Outpatient Medications   Medication Sig Dispense Refill     levofloxacin (LEVAQUIN) 500 MG tablet Take 1 tablet (500 mg) by mouth daily for 1 day 1 tablet 0     levofloxacin (LEVAQUIN) 500 MG tablet Take 1 tablet (500 mg) by mouth daily 3 tablet 0     lisinopril (PRINIVIL/ZESTRIL) 10 MG tablet Take 10 mg by mouth daily       potassium citrate (UROCIT-K) 10 MEQ (1080 MG) CR tablet Take 2 tablets (20 mEq) by mouth 3 times daily (with meals) (Patient taking differently: Take 20 mEq by mouth 3 times daily (with meals) Patient is taking 4 tabs instead of 6) 360 tablet 4     potassium citrate (UROCIT-K) 10 MEQ (1080 MG) CR tablet Take 2 tablets (20 mEq) by mouth 2 times daily (with meals) 360 tablet 3       Social History     Tobacco Use     Smoking status: Never Smoker     Smokeless tobacco: Never Used   Substance Use Topics     Alcohol use: Yes     Comment: 8-10 drinks per  week     Drug use: No       Procedure was explained to patient prior to performing said procedure. The patient signed the consent form and all questions were answered prior to the procedure. Any pre-procedural antibiotics were given according to the performing physicians recommendation. Pt's information was confirmed on samples and samples were sent for analysis. [facility] reviewed information on labels sent with patient and confirmed the accuracy of all the labels.    Consent read and signed: Yes     Allergies   Allergen Reactions     No Known Drug Allergies      Performing Physician: Dr. Andrade  Antibiotic taken?  Yes sent  another antibiotic in to the " pharmacy  Aspirin or other blood thinning medications discontinued 7-10 days:  YES  Time of Fleet's enema:  Yes  Patient given Gentamicin intramuscular injection 30 minutes prior to procedure Yes Given by Judy Barrow    12 samples were taken from the right and left, medial and lateral base, mid, and apex of the prostate respectively. Yes additional samples were taken Vitals were repeated prior to patient leaving and instructions for post Transrectal Ultrasound/MRI Guided Prostate Biopsies  care were explained to the patient.    The following medication was given:     MEDICATION:  Lidocaine % Soln  ROUTE: RECTAL  SITE: PROSTATE  DOSE: 15ML  LOT #: [id number]  : [employer]  EXPIRATION DATE: 05/2023  NDC#: [address]-63  Was there drug waste? Yes  Amount of drug waste (mL): 35ML.  Reason for waste:  Single use vial  Multi-dose vial: PENNIE Gu  12/16/2021  10:59 AM

## 2021-12-16 NOTE — PROGRESS NOTES
Here for an MRI-ultrasound-fusion guided biopsy of the prostate    We previously obtained an MRI of the prostate and identified all PIRADS 4-5 lesions for targeting    Target Lesion #1 right anterior apex lesion  Target Lesion #2 left lateral lesion  Target lesion #3 right base lesion  Target lesion #4 left apex lesion    Prostate volume on MRI 40 g    PSA Tumor Marker   Date Value Ref Range Status   11/02/2021 9.30 (H) 0.00 - 4.00 ug/L Final       An enema was completed and 500 mg of Cipro was given prior to the biopsy.  After obtaining informed consent patient was placed in lateral decubitus position.  The ultrasound probe was placed in the rectum.  The prostate was numbed using ultrasound guidance with 1% lidocaine 5 mls along each nerve bundle.  US images were obtained and then fused with the MRI images.  The fused images were then used to guide the biopsy of the targeted lesions with at least 2 cores taken of each lesion.  We then performed random biopsies.  12 cores were taken with 6 on each side, base, mid and apex.  The patient tolerated the procedure well.      We will follow up with the results in 7-10 days and contact patient with these results.

## 2021-12-16 NOTE — PATIENT INSTRUCTIONS
[facility]  Transrectal Ultrasound  Post Operative Information    The physician who performed your Transrectal Ultrasound is Dr. Andrade (telephone number [phone]).  Please contact this doctor if you have any problems or questions.  If unable to reach your doctor, please return to the Emergency Department.      Take one antibiotic the evening of the procedure and then as directed on your prescription.    Drink at least 6-8 glasses of fluids for the first 48 hours.    Avoid heavy lifting and strenuous activity for 48 hours.    Avoid sexual intercourse for the first 24 hours.    No aspirin or ibuprofen products (Motrin, Advil, Nuprin, ect.) for one week.  You may take acetaminophen (Tylenol) for pain.    You may notice a small amount of blood on the tissue after a bowel movement.    You may pass blood with clots in your urine following the procedure.  The amount will decrease with time but may be visible for up to two weeks.     You make have blood in your semen for 4 weeks after the procedure.    You may experience mild perineal (groin area) discomfort after the procedure.    Please call you doctor if you have any of the follow symptoms:  Fever  Increase in the amount of blood passed  Severe discomfort or pain

## 2021-12-16 NOTE — NURSING NOTE
Chief Complaint   Patient presents with     Elevated PSA     Patient is here for Transrectal Ultrasound/MRI Guided Prostate Biopsies      Procedure was explained to patient prior to performing said procedure.  The patient signed the [facility] consent form and all questions were answered prior to the procedure.  Any pre-procedural antibiotics were given according to the performing physician's recommendation.  Patient reviewed information on the labels attached to samples and confirmed the accuracy of all of the labels.     Performing Physician:  Dr. Andrade  Antibiotic taken?  yes  Aspirin or other blood thinning medications discontinued 7-10 days:  yes  Time of enema:    Patient given Gentamicin intramuscular injection 30 minutes prior to procedure  Yes     Vitals were repeated prior to patient leaving and instructions for post TRUS care were explained to the patient.     Judy Barrow LPN

## 2021-12-16 NOTE — PATIENT INSTRUCTIONS
[facility]  Transrectal Ultrasound  Post Operative Information    The physician who performed your Transrectal Ultrasound is Dr. Andrade (telephone number [phone]).  Please contact this doctor if you have any problems or questions.  If unable to reach your doctor, please return to the Emergency Department.      Take one antibiotic the evening of the procedure and then as directed on your prescription.    Drink at least 6-8 glasses of fluids for the first 48 hours.    Avoid heavy lifting and strenuous activity for 48 hours.    Avoid sexual intercourse for the first 24 hours.    No aspirin or ibuprofen products (Motrin, Advil, Nuprin, ect.) for one week.  You may take acetaminophen (Tylenol) for pain.    You may notice a small amount of blood on the tissue after a bowel movement.    You may pass blood with clots in your urine following the procedure.  The amount will decrease with time but may be visible for up to two weeks.     You make have blood in your semen for 4 weeks after the procedure.    You may experience mild perineal (groin area) discomfort after the procedure.    Please call you doctor if you have any of the follow symptoms:  Fever  Increase in the amount of blood passed  Severe discomfort or pain

## 2021-12-16 NOTE — NURSING NOTE
The following medication was given:     MEDICATION: Gentamicin   ROUTE: IM  SITE: LUQ - Gluteus  DOSE:80mg/2ml  LOT #: [id number]  : [employer]  EXPIRATION DATE: 02/23  NDC#:97080-283-44    Was there drug waste? No  Multi-dose vial: Yes  Using a 1 1/2 inch 18 guage needle Gentamicin medication was drawn up as ordered with sterile syringe. Using sterile technique, a new 1 1/2 needle 18 gauge placed on syringe and patient cleansed with alcohol pad. Using a 90 degree angle dart method and after aspiration of needle, patient was given IM injection. Patient tolerated injection well, and bandage placed on site following insertion removal.   Judy Barrow LPN

## 2021-12-16 NOTE — LETTER
12/16/2021       RE: Magdaleno Marie  [address]  [place] [address]     Dear Colleague,    Thank you for referring your patient, Magdaleno Marie, to the [facility] [place] at [facility]. Please see a copy of my visit note below.    Here for an MRI-ultrasound-fusion guided biopsy of the prostate    We previously obtained an MRI of the prostate and identified all PIRADS 4-5 lesions for targeting    Target Lesion #1 right anterior apex lesion  Target Lesion #2 left lateral lesion  Target lesion #3 right base lesion  Target lesion #4 left apex lesion    Prostate volume on MRI 40 g    PSA Tumor Marker   Date Value Ref Range Status   11/02/2021 9.30 (H) 0.00 - 4.00 ug/L Final       An enema was completed and 500 mg of Cipro was given prior to the biopsy.  After obtaining informed consent patient was placed in lateral decubitus position.  The ultrasound probe was placed in the rectum.  The prostate was numbed using ultrasound guidance with 1% lidocaine 5 mls along each nerve bundle.  US images were obtained and then fused with the MRI images.  The fused images were then used to guide the biopsy of the targeted lesions with at least 2 cores taken of each lesion.  We then performed random biopsies.  12 cores were taken with 6 on each side, base, mid and apex.  The patient tolerated the procedure well.      We will follow up with the results in 7-10 days and contact patient with these results.   Arnel Andrade MD

## 2021-12-18 DIAGNOSIS — Z11.59 ENCOUNTER FOR SCREENING FOR OTHER VIRAL DISEASES: ICD-10-CM

## 2021-12-20 LAB
PATH REPORT.COMMENTS IMP SPEC: ABNORMAL
PATH REPORT.COMMENTS IMP SPEC: YES
PATH REPORT.FINAL DX SPEC: ABNORMAL
PATH REPORT.GROSS SPEC: ABNORMAL
PATH REPORT.MICROSCOPIC SPEC OTHER STN: ABNORMAL
PATH REPORT.RELEVANT HX SPEC: ABNORMAL
PHOTO IMAGE: ABNORMAL

## 2021-12-27 ENCOUNTER — VIRTUAL VISIT (OUTPATIENT)
Dept: UROLOGY | Facility: CLINIC | Age: 66
End: 2021-12-27
Payer: COMMERCIAL

## 2021-12-27 VITALS — WEIGHT: 225 LBS | BODY MASS INDEX: 31.5 KG/M2 | HEIGHT: 71 IN

## 2021-12-27 DIAGNOSIS — N20.0 NEPHROLITHIASIS: ICD-10-CM

## 2021-12-27 DIAGNOSIS — C61 PROSTATE CANCER (H): Primary | ICD-10-CM

## 2021-12-27 PROCEDURE — 99214 OFFICE O/P EST MOD 30 MIN: CPT | Mod: 95 | Performed by: UROLOGY

## 2021-12-27 ASSESSMENT — MIFFLIN-ST. JEOR: SCORE: 1812.72

## 2021-12-27 ASSESSMENT — PAIN SCALES - GENERAL: PAINLEVEL: NO PAIN (0)

## 2021-12-27 NOTE — LETTER
12/27/2021       RE: Magdaleno Marie  [id number]  [place] [address]     Dear Colleague,    Thank you for referring your patient, Magdaleno Marie, to the [facility] [place] at [facility]. Please see a copy of my visit note below.     Patient will meet you in My Chart       MAGDALENO is a 66 year old who is being evaluated via a billable video visit.        Office Visit Note  [facility]  [phone]    UROLOGIC DIAGNOSES:   Kidney stones  Prostate cancer  Enlarged prostate    CURRENT INTERVENTIONS:   Scheduled for upcoming ureteroscopy  Potassium citrate    HISTORY:   Magdaleno is set up for virtual visit today in order to go over his recent prostate biopsy results.  He has felt well since the biopsy.  He had 4 lesions that were seen on the MRI that were all biopsied and targeted using MRI fusion technology.  One of the lesions on the left side showed a [place] 3+4 equal 7 prostate cancer.  Another one of the lesions showed [place] 3+3 equal 6 prostate cancer in 2 of the lesions were negative for prostate cancer.  On his template biopsies, 3 of the biopsies were positive for prostate cancer on the left side and 2 were positive on the right side.  1 of these lesions on the left side also showed [place] 3+4 equal 7 prostate cancer.  He has no family history of prostate cancer.      PAST MEDICAL HISTORY:   Past Medical History:   Diagnosis Date     Calculus of kidney     kidney stones     Hypertension        PAST SURGICAL HISTORY:   Past Surgical History:   Procedure Laterality Date     COLONOSCOPY       COMBINED CYSTOSCOPY, RETROGRADES, URETEROSCOPY, LASER HOLMIUM LITHOTRIPSY URETER(S), INSERT STENT Left 7/28/2016    Procedure: COMBINED CYSTOSCOPY, RETROGRADES, URETEROSCOPY, LASER HOLMIUM LITHOTRIPSY URETER(S), INSERT STENT;  Surgeon: Arnel Andrade MD;  Location:  OR     COMBINED CYSTOSCOPY,  RETROGRADES, URETEROSCOPY, LASER HOLMIUM LITHOTRIPSY URETER(S), INSERT STENT Bilateral 9/9/2019    Procedure: Cystoscopy, bilateral retrograde pyelograms, interpretation of fluoroscopic images, bilateral ureteroscopy with holmium laser lithotripsy and stone basketing, placement of 5 x 26 double-J ureteral stents bilaterally;  Surgeon: Arnel Andrade MD;  Location: [facility] OR     CYSTOSCOPY       GENITOURINARY SURGERY  1980's    shock wave lithotripsy       FAMILY HISTORY: History reviewed. No pertinent family history.    SOCIAL HISTORY:   Social History     Tobacco Use     Smoking status: Never Smoker     Smokeless tobacco: Never Used   Substance Use Topics     Alcohol use: Yes     Comment: 8-10 drinks per  week       REVIEW OF SYSTEMS:  Skin: No rash, pruritis, or skin pigmentation  Eyes: No changes in vision  Ears/Nose/Throat: No changes in hearing, no nosebleeds  Respiratory: No shortness of breath, dyspnea on exertion, cough, or hemoptysis  Cardiovascular: No chest pain or palpitations  Gastrointestinal: No diarrhea or constipation. No abdominal pain. No hematochezia  Genitourinary: see HPI  Musculoskeletal: No pain or swelling of joints, normal range of motion  Neurologic: No weakness or tremors  Psychiatric: No recent changes in memory or mood  Hematologic/Lymphatic/Immunologic: No easy bruising or enlarged lymph nodes  Endocrine: No weight gain or loss      PHYSICAL EXAM:    General: Alert and oriented to time, place, and self. In NAD   HEENT: Head AT/NC, EOMI, CN Grossly intact   Lungs: no respiratory distress, or pursed lip breathing   Heart: No obvious jugular venous distension present   Musculoskeltal: Normal movements. Normal appearing musculature  Skin: no suspicious lesions or rashes   Neuro: Alert, oriented, speech and mentation normal; moving all 4 extremities equally.   Psych: affect and mood normal    Imaging: None    Urinalysis: UA RESULTS:  Recent Labs   Lab Test 11/02/21  1353 05/28/16  1424    COLOR Yellow Light Yellow   APPEARANCE Clear Clear   URINEGLC Negative Negative   URINEBILI Negative Negative   URINEKETONE Negative Negative   SG 1.025 1.003   UBLD Negative Moderate*   URINEPH 7.0 6.5   PROTEIN Negative Negative   UROBILINOGEN 0.2  --    NITRITE Negative Negative   LEUKEST Negative Negative   RBCU  --  26*   WBCU  --  <1       PSA: 9.3    Post Void Residual:     Other labs: None today      IMPRESSION:  Intermediate risk prostate cancer, kidney stones    PLAN:  We discussed his prostate biopsy results.  He has been diagnosed with an intermediate risk prostate cancer.  We discussed the diagnosis as well as the natural history of prostate cancer.  We discussed treatment options.    We discussed active surveillance as a treatment option.  We discussed a normal surveillance schedule.  However, active surveillance would carry a risk of cancer progression with him given the intermediate risk cancer as well as his overall good health and long life expectancy.    We discussed robotic prostatectomy as a treatment option.  We discussed the risks of the procedure along with expected recovery.    We discussed radiotherapy combined with hormonal therapy as a treatment option as well.  We also discussed the risks of this treatment as well as a normal radiation schedule.    He would like some time to think over his treatment options.  He is considering undergoing a robotic assisted laparoscopic radical prostatectomy.  He does also have his upcoming ureteroscopy scheduled for his kidney stones.  He would certainly want to have any kidney stones removed before he proceeded with a surgery for prostate cancer.    I will make certain that when I see him back a week after his ureteroscopy for stent removal we have adequate time to discuss prostate cancer again that day and to help him formulate a treatment plan.      Arnel Andrade M.D.

## 2021-12-27 NOTE — PROGRESS NOTES
Patient will meet you in My Chart       MAGDALENO is a 66 year old who is being evaluated via a billable video visit.      How would you like to obtain your AVS? Cartavihart  If the video visit is dropped, the invitation should be resent by: Text to cell phone: [phone]  Will anyone else be joining your video visit? No      Office Visit Note  [facility]  [phone]    UROLOGIC DIAGNOSES:   Kidney stones  Prostate cancer  Enlarged prostate    CURRENT INTERVENTIONS:   Scheduled for upcoming ureteroscopy  Potassium citrate    HISTORY:   Magdaleno is set up for virtual visit today in order to go over his recent prostate biopsy results.  He has felt well since the biopsy.  He had 4 lesions that were seen on the MRI that were all biopsied and targeted using MRI fusion technology.  One of the lesions on the left side showed a [place] 3+4 equal 7 prostate cancer.  Another one of the lesions showed [place] 3+3 equal 6 prostate cancer in 2 of the lesions were negative for prostate cancer.  On his template biopsies, 3 of the biopsies were positive for prostate cancer on the left side and 2 were positive on the right side.  1 of these lesions on the left side also showed [place] 3+4 equal 7 prostate cancer.  He has no family history of prostate cancer.      PAST MEDICAL HISTORY:   Past Medical History:   Diagnosis Date     Calculus of kidney     kidney stones     Hypertension        PAST SURGICAL HISTORY:   Past Surgical History:   Procedure Laterality Date     COLONOSCOPY       COMBINED CYSTOSCOPY, RETROGRADES, URETEROSCOPY, LASER HOLMIUM LITHOTRIPSY URETER(S), INSERT STENT Left 7/28/2016    Procedure: COMBINED CYSTOSCOPY, RETROGRADES, URETEROSCOPY, LASER HOLMIUM LITHOTRIPSY URETER(S), INSERT STENT;  Surgeon: Arnel Andrade MD;  Location:  OR     COMBINED CYSTOSCOPY, RETROGRADES, URETEROSCOPY, LASER HOLMIUM LITHOTRIPSY URETER(S), INSERT STENT Bilateral 9/9/2019    Procedure: Cystoscopy, bilateral  retrograde pyelograms, interpretation of fluoroscopic images, bilateral ureteroscopy with holmium laser lithotripsy and stone basketing, placement of 5 x 26 double-J ureteral stents bilaterally;  Surgeon: Arnel Andrade MD;  Location: [facility] OR     CYSTOSCOPY       GENITOURINARY SURGERY  1980's    shock wave lithotripsy       FAMILY HISTORY: History reviewed. No pertinent family history.    SOCIAL HISTORY:   Social History     Tobacco Use     Smoking status: Never Smoker     Smokeless tobacco: Never Used   Substance Use Topics     Alcohol use: Yes     Comment: 8-10 drinks per  week       REVIEW OF SYSTEMS:  Skin: No rash, pruritis, or skin pigmentation  Eyes: No changes in vision  Ears/Nose/Throat: No changes in hearing, no nosebleeds  Respiratory: No shortness of breath, dyspnea on exertion, cough, or hemoptysis  Cardiovascular: No chest pain or palpitations  Gastrointestinal: No diarrhea or constipation. No abdominal pain. No hematochezia  Genitourinary: see HPI  Musculoskeletal: No pain or swelling of joints, normal range of motion  Neurologic: No weakness or tremors  Psychiatric: No recent changes in memory or mood  Hematologic/Lymphatic/Immunologic: No easy bruising or enlarged lymph nodes  Endocrine: No weight gain or loss      PHYSICAL EXAM:    General: Alert and oriented to time, place, and self. In NAD   HEENT: Head AT/NC, EOMI, CN Grossly intact   Lungs: no respiratory distress, or pursed lip breathing   Heart: No obvious jugular venous distension present   Musculoskeltal: Normal movements. Normal appearing musculature  Skin: no suspicious lesions or rashes   Neuro: Alert, oriented, speech and mentation normal; moving all 4 extremities equally.   Psych: affect and mood normal    Imaging: None    Urinalysis: UA RESULTS:  Recent Labs   Lab Test 11/02/21  1353 05/28/16  1424   COLOR Yellow Light Yellow   APPEARANCE Clear Clear   URINEGLC Negative Negative   URINEBILI Negative Negative   URINEKETONE  Negative Negative   SG 1.025 1.003   UBLD Negative Moderate*   URINEPH 7.0 6.5   PROTEIN Negative Negative   UROBILINOGEN 0.2  --    NITRITE Negative Negative   LEUKEST Negative Negative   RBCU  --  26*   WBCU  --  <1       PSA: 9.3    Post Void Residual:     Other labs: None today      IMPRESSION:  Intermediate risk prostate cancer, kidney stones    PLAN:  We discussed his prostate biopsy results.  He has been diagnosed with an intermediate risk prostate cancer.  We discussed the diagnosis as well as the natural history of prostate cancer.  We discussed treatment options.    We discussed active surveillance as a treatment option.  We discussed a normal surveillance schedule.  However, active surveillance would carry a risk of cancer progression with him given the intermediate risk cancer as well as his overall good health and long life expectancy.    We discussed robotic prostatectomy as a treatment option.  We discussed the risks of the procedure along with expected recovery.    We discussed radiotherapy combined with hormonal therapy as a treatment option as well.  We also discussed the risks of this treatment as well as a normal radiation schedule.    He would like some time to think over his treatment options.  He is considering undergoing a robotic assisted laparoscopic radical prostatectomy.  He does also have his upcoming ureteroscopy scheduled for his kidney stones.  He would certainly want to have any kidney stones removed before he proceeded with a surgery for prostate cancer.    I will make certain that when I see him back a week after his ureteroscopy for stent removal we have adequate time to discuss prostate cancer again that day and to help him formulate a treatment plan.      Arnel Andrade M.D.              Video Start Time: 8:57 AM  Video-Visit Details    Type of service:  Video Visit    Video End Time:9:18 AM    Originating Location (pt. Location): Home    Distant Location (provider  location):  [facility] [place]     Platform used for Video Visit: [employer]aWell

## 2022-01-10 ENCOUNTER — LAB (OUTPATIENT)
Dept: LAB | Facility: CLINIC | Age: 67
End: 2022-01-10
Attending: UROLOGY
Payer: COMMERCIAL

## 2022-01-10 DIAGNOSIS — Z11.59 ENCOUNTER FOR SCREENING FOR OTHER VIRAL DISEASES: ICD-10-CM

## 2022-01-10 PROCEDURE — U0005 INFEC AGEN DETEC AMPLI PROBE: HCPCS

## 2022-01-11 LAB — SARS-COV-2 RNA RESP QL NAA+PROBE: POSITIVE

## 2022-01-12 ENCOUNTER — TELEPHONE (OUTPATIENT)
Dept: EMERGENCY MEDICINE | Facility: CLINIC | Age: 67
End: 2022-01-12
Payer: COMMERCIAL

## 2022-01-12 NOTE — TELEPHONE ENCOUNTER
Coronavirus (COVID-19) Notification  Procedure   Reason for call  Notify of POSITIVE  COVID-19 lab result, assess symptoms,  review [facility] recommendations    Lab Result   Lab test for 2019-nCoV rRt-PCR or SARS-COV-2 PCR  Oropharyngeal AND/OR nasopharyngeal swabs were POSITIVE for 2019-nCoV RNA [OR] SARS-COV-2 RNA (COVID-19) RNA     We have been unable to reach Patient by phone at this time to notify of their Positive COVID-19 result.  Left voicemail message requesting a call back to [phone] [facility] for results.        POSITIVE COVID-19 Letter sent.    Prabha Wilson LPN

## 2022-01-12 NOTE — TELEPHONE ENCOUNTER
"Coronavirus (COVID-19) Notification    Caller Name (Patient, parent, daughter/son, grandparent, etc)  Patient returned call  Had his procedure rescheduled due to test.  Patient stated I had covid 12/28/21 and I have recovered.   Reason for call  Notify of Positive Coronavirus (COVID-19) lab results, assess symptoms,  review [facility] recommendations    Lab Result    Lab test:  2019-nCoV rRt-PCR or SARS-CoV-2 PCR    Oropharyngeal AND/OR nasopharyngeal swabs is POSITIVE for 2019-nCoV RNA/SARS-COV-2 PCR (COVID-19 virus)    RN Recommendations/Instructions per [facility] Coronavirus COVID-19 recommendations    Brief introduction script  Introduce self then review script:  \"I am calling on behalf of [email or web address].  We were notified that your Coronavirus test (COVID-19) for was POSITIVE for the virus.  I have some information to relay to you but first I wanted to mention that the [place] Dept of Health will be contacting you shortly [it's possible MD already called Patient] to talk to you more about how you are feeling and other people you have had contact with who might now also have the virus.  Also, [facility] is Partnering with the [facility] for Covid-19 research, you may be contacted directly by research staff.\"    Assessment (Inquire about Patient's current symptoms)   Assessment   Current Symptoms at time of phone call: (if no symptoms, document No symptoms] Slight cough   Symptoms onset (if applicable) 12/28/21     If at time of call, Patients symptoms hare worsened, the Patient should contact 911 or have someone drive them to Emergency Dept promptly:      If Patient calling 911, inform 911 personal that you have tested positive for the Coronavirus (COVID-19).  Place mask on and await 911 to arrive.    If Emergency Dept, If possible, please have another adult drive you to the Emergency Dept but you need to wear mask when in contact with other people.      Monoclonal Antibody " Administration    You may be eligible to receive a new treatment with a monoclonal antibody for preventing hospitalization in patients at high risk for complications from COVID-19.   This medication is still experimental and available on a limited basis; it is given through an IV and must be given at an infusion center. Please note that not all people who are eligible will receive the medication since it is in limited supply.     Are you interested in being considered for this medication?  No.   Does the patient fit the criteria: Patient declined    Review information with Patient    Your result was positive. This means you have COVID-19 (coronavirus).  We have sent you a letter that reviews the information that I'll be reviewing with you now.    How can I protect others?    If you have symptoms: stay home and away from others (self-isolate) until:    You've had no fever--and no medicine that reduces fever--for 1 full day (24 hours). And       Your other symptoms have gotten better. For example, your cough or breathing has improved. And     At least 10 days have passed since your symptoms started. (If you've been told by a doctor that you have a weak immune system, wait 20 days.)     If you don't have symptoms: Stay home and away from others (self-isolate) until at least 10 days have passed since your first positive COVID-19 test. (Date test collected)    During this time:    Stay in your own room, including for meals. Use your own bathroom if you can.    Stay away from others in your home. No hugging, kissing or shaking hands. No visitors.     Don't go to work, school or anywhere else.     Clean  high touch  surfaces often (doorknobs, counters, handles, etc.). Use a household cleaning spray or wipes. You'll find a full list on the EPA website at www.epa.gov/pesticide-registration/list-n-disinfectants-use-against-sars-cov-2.     Cover your mouth and nose with a mask, tissue or other face covering to avoid spreading  germs.    Wash your hands and face often with soap and water.    Make a list of people you have been in close contact with recently, even if either of you wore a face covering.   - Start your list from 2 days before you became ill or had a positive test.  - Include anyone that was within 6 feet of you for a cumulative total of 15 minutes or more in 24 hours. (Example: if you sat next to Weston for 5 minutes in the morning and 10 minutes in the afternoon, then you were in close contact for 15 minutes total that day. Weston would be added to your list.)    A public health worker will call or text you. It is important that you answer. They will ask you questions about possible exposures to COVID-19, such as people you have been in direct contact with and places you have visited.    Tell the people on your list that you have COVID-19; they should stay away from others for 14 days starting from the last time they were in contact with you (unless you are told something different from a public health worker).     Caregivers in these groups are at risk for severe illness due to COVID-19:  o People 65 years and older  o People who live in a nursing home or long-term care facility  o People with chronic disease (lung, heart, cancer, diabetes, kidney, liver, immunologic)  o People who have a weakened immune system, including those who:  - Are in cancer treatment  - Take medicine that weakens the immune system, such as corticosteroids  - Had a bone marrow or organ transplant  - Have an immune deficiency  - Have poorly controlled HIV or AIDS  - Are obese (body mass index of 40 or higher)  - Smoke regularly    Caregivers should wear gloves while washing dishes, handling laundry and cleaning bedrooms and bathrooms.    Wash and dry laundry with special caution. Don't shake dirty laundry, and use the warmest water setting you can.    If you have a weakened immune system, ask your doctor about other actions you should take.    For more  tips, go to www.cdc.gov/coronavirus/2019-ncov/downloads/10Things.pdf.    You should not go back to work until you meet the guidelines above for ending your home isolation. You don't need to be retested for COVID-19 before going back to work--studies show that you won't spread the virus if it's been at least 10 days since your symptoms started (or 20 days, if you have a weak immune system).    Employers: This document serves as formal notice of your employee's medical guidelines for going back to work. They must meet the above guidelines before going back to work in person.    How can I take care of myself?    1. Get lots of rest. Drink extra fluids (unless a doctor has told you not to).    2. Take Tylenol (acetaminophen) for fever or pain. If you have liver or kidney problems, ask your family doctor if it's okay to take Tylenol.     Take either:     650 mg (two 325 mg pills) every 4 to 6 hours, or     1,000 mg (two 500 mg pills) every 8 hours as needed.     Note: Don't take more than 3,000 mg in one day. Acetaminophen is found in many medicines (both prescribed and over-the-counter medicines). Read all labels to be sure you don't take too much.    For children, check the Tylenol bottle for the right dose (based on their age or weight).    3. If you have other health problems (like cancer, heart failure, an organ transplant or severe kidney disease): Call your specialty clinic if you don't feel better in the next 2 days.    4. Know when to call 911: Emergency warning signs include:    Trouble breathing or shortness of breath    Pain or pressure in the chest that doesn't go away    Feeling confused like you haven't felt before, or not being able to wake up    Bluish-colored lips or face    5. Sign up for [employer]. We know it's scary to hear that you have COVID-19. We want to track your symptoms to make sure you're okay over the next 2 weeks. Please look for an email from [employer]--this is a free, online  program that we'll use to keep in touch. To sign up, follow the link in the email. Learn more at [email or web address].    Where can I get more information?    [facility]: [email or web address]    Coronavirus Basics: www.health.[email or web address]./diseases/coronavirus/basics.html    What to Do If You're Sick: www.cdc.gov/coronavirus/2019-ncov/about/steps-when-sick.html    Ending Home Isolation: www.cdc.gov/coronavirus/2019-ncov/hcp/disposition-in-home-patients.html     Caring for Someone with COVID-19: www.cdc.gov/coronavirus/2019-ncov/if-you-are-sick/care-for-someone.html     [facility] clinical trials (COVID-19 research studies): [email or web address]     A Positive COVID-19 letter will be sent via [employer] or the mail. (Exception, no letters sent to Presurgerical/Preprocedure Patients)    Prabha Wilson LPN

## 2022-01-20 ENCOUNTER — ANESTHESIA EVENT (OUTPATIENT)
Dept: SURGERY | Facility: CLINIC | Age: 67
End: 2022-01-20
Payer: COMMERCIAL

## 2022-01-20 NOTE — ANESTHESIA PREPROCEDURE EVALUATION
Anesthesia Pre-Procedure Evaluation    Patient: Magdaleno Marie   MRN: [id number] : 1955        Preoperative Diagnosis: Kidney stone [N20.0]    Procedure : Procedure(s):  Cystoscopy, right ureteroscopy with holmium laser lithotripsy, right ureteral stent placement          Past Medical History:   Diagnosis Date     Calculus of kidney     kidney stones     Hypertension       Past Surgical History:   Procedure Laterality Date     COLONOSCOPY       COMBINED CYSTOSCOPY, RETROGRADES, URETEROSCOPY, LASER HOLMIUM LITHOTRIPSY URETER(S), INSERT STENT Left 2016    Procedure: COMBINED CYSTOSCOPY, RETROGRADES, URETEROSCOPY, LASER HOLMIUM LITHOTRIPSY URETER(S), INSERT STENT;  Surgeon: Arnel Andrade MD;  Location: [facility] OR     COMBINED CYSTOSCOPY, RETROGRADES, URETEROSCOPY, LASER HOLMIUM LITHOTRIPSY URETER(S), INSERT STENT Bilateral 2019    Procedure: Cystoscopy, bilateral retrograde pyelograms, interpretation of fluoroscopic images, bilateral ureteroscopy with holmium laser lithotripsy and stone basketing, placement of 5 x 26 double-J ureteral stents bilaterally;  Surgeon: Arnel Andrade MD;  Location: [facility] OR     CYSTOSCOPY       GENITOURINARY SURGERY      shock wave lithotripsy     ORTHOPEDIC SURGERY      Torn meniscus       Allergies   Allergen Reactions     No Known Drug Allergies       Social History     Tobacco Use     Smoking status: Never Smoker     Smokeless tobacco: Never Used   Substance Use Topics     Alcohol use: Yes     Comment: 12-14 drinks per  week      Wt Readings from Last 1 Encounters:   21 102.1 kg (225 lb)      HGB 15.5    09-SEP-2019 10:30:49 [facility]DS[facility] ROUTINE RECORD Sinus rhythm Cannot rule out A    Anesthesia Evaluation            ROS/MED HX  ENT/Pulmonary: Comment: Covid + 1/10/2022, infection started 2021 and lasted to 2022. Slight lingering cough   (-) sleep apnea   Neurologic:  - neg neurologic ROS  (-) no seizures, no CVA  and migraines   Cardiovascular:     (+) Dyslipidemia hypertension----- (-) CHF, orthopnea/PND and arrhythmias   METS/Exercise Tolerance:     Hematologic:  - neg hematologic  ROS     Musculoskeletal:  - neg musculoskeletal ROS     GI/Hepatic:  - neg GI/hepatic ROS  (-) GERD   Renal/Genitourinary:     (+) renal disease, Nephrolithiasis ,     Endo:     (+) Obesity,  (-) Type II DM and thyroid disease   Psychiatric/Substance Use: Comment: 14 drinks per week - 2 glasses of wine per day - neg psychiatric ROS     Infectious Disease:       Malignancy:   (+) Malignancy, History of Prostate.    Other:            Physical Exam    Airway  airway exam normal      Mallampati: III   TM distance: > 3 FB   Neck ROM: full   Mouth opening: > 3 cm    Respiratory Devices and Support         Dental  no notable dental history         Cardiovascular   cardiovascular exam normal       Rhythm and rate: regular and normal     Pulmonary   pulmonary exam normal        breath sounds clear to auscultation           OUTSIDE LABS:  CBC:   Lab Results   Component Value Date    WBC 8.6 05/28/2016    HGB 16.3 05/28/2016    HCT 46.3 05/28/2016     (L) 05/28/2016     BMP:   Lab Results   Component Value Date     05/28/2016    POTASSIUM 4.1 09/09/2019    POTASSIUM 3.8 05/28/2016    CHLORIDE 104 05/28/2016    CO2 25 05/28/2016    BUN 22 05/28/2016    CR 1.65 (H) 09/09/2019    CR 0.97 05/28/2016    GLC 99 05/28/2016     COAGS: No results found for: PTT, INR, FIBR  POC: No results found for: BGM, HCG, HCGS  HEPATIC: No results found for: ALBUMIN, PROTTOTAL, ALT, AST, GGT, ALKPHOS, BILITOTAL, BILIDIRECT, ALON  OTHER:   Lab Results   Component Value Date    CHRISTOPH 9.0 05/28/2016       Anesthesia Plan    ASA Status:  2   NPO Status:  NPO Appropriate    Anesthesia Type: General.     - Airway: LMA   Induction: Propofol.   Maintenance: Balanced.        Consents    Anesthesia Plan(s) and associated risks, benefits, and realistic alternatives discussed.  Questions answered and patient/representative(s) expressed understanding.    - Discussed:     - Discussed with:  Patient         Postoperative Care    Pain management: IV analgesics.   PONV prophylaxis: Ondansetron (or other 5HT-3)     Comments:                Artie Giron MD

## 2022-01-21 ENCOUNTER — ANESTHESIA (OUTPATIENT)
Dept: SURGERY | Facility: CLINIC | Age: 67
End: 2022-01-21
Payer: COMMERCIAL

## 2022-01-21 ENCOUNTER — HOSPITAL ENCOUNTER (OUTPATIENT)
Facility: CLINIC | Age: 67
Discharge: HOME | End: 2022-01-21
Attending: UROLOGY | Admitting: UROLOGY
Payer: COMMERCIAL

## 2022-01-21 ENCOUNTER — APPOINTMENT (OUTPATIENT)
Dept: GENERAL RADIOLOGY | Facility: CLINIC | Age: 67
End: 2022-01-21
Attending: UROLOGY
Payer: COMMERCIAL

## 2022-01-21 VITALS
DIASTOLIC BLOOD PRESSURE: 82 MMHG | BODY MASS INDEX: 32.17 KG/M2 | SYSTOLIC BLOOD PRESSURE: 136 MMHG | HEIGHT: 71 IN | HEART RATE: 73 BPM | RESPIRATION RATE: 16 BRPM | WEIGHT: 229.8 LBS | TEMPERATURE: 97.2 F | OXYGEN SATURATION: 95 %

## 2022-01-21 DIAGNOSIS — N20.0 KIDNEY STONE: Primary | ICD-10-CM

## 2022-01-21 LAB
ANION GAP SERPL CALCULATED.3IONS-SCNC: 4 MMOL/L (ref 3–14)
BUN SERPL-MCNC: 24 MG/DL (ref 7–30)
CALCIUM SERPL-MCNC: 9 MG/DL (ref 8.5–10.1)
CHLORIDE BLD-SCNC: 104 MMOL/L (ref 94–109)
CO2 SERPL-SCNC: 26 MMOL/L (ref 20–32)
CREAT SERPL-MCNC: 1 MG/DL (ref 0.66–1.25)
GFR SERPL CREATININE-BSD FRML MDRD: 82 ML/MIN/1.73M2
GLUCOSE BLD-MCNC: 104 MG/DL (ref 70–99)
POTASSIUM BLD-SCNC: 4.1 MMOL/L (ref 3.4–5.3)
SODIUM SERPL-SCNC: 134 MMOL/L (ref 133–144)

## 2022-01-21 PROCEDURE — 250N000025 HC SEVOFLURANE, PER MIN: Performed by: UROLOGY

## 2022-01-21 PROCEDURE — 370N000017 HC ANESTHESIA TECHNICAL FEE, PER MIN: Performed by: UROLOGY

## 2022-01-21 PROCEDURE — 258N000003 HC RX IP 258 OP 636: Performed by: ANESTHESIOLOGY

## 2022-01-21 PROCEDURE — 258N000003 HC RX IP 258 OP 636: Performed by: NURSE ANESTHETIST, CERTIFIED REGISTERED

## 2022-01-21 PROCEDURE — 82365 CALCULUS SPECTROSCOPY: CPT | Performed by: UROLOGY

## 2022-01-21 PROCEDURE — 250N000013 HC RX MED GY IP 250 OP 250 PS 637: Performed by: ANESTHESIOLOGY

## 2022-01-21 PROCEDURE — 80048 BASIC METABOLIC PNL TOTAL CA: CPT | Performed by: UROLOGY

## 2022-01-21 PROCEDURE — 74420 UROGRAPHY RTRGR +-KUB: CPT | Mod: 26 | Performed by: UROLOGY

## 2022-01-21 PROCEDURE — 710N000012 HC RECOVERY PHASE 2, PER MINUTE: Performed by: UROLOGY

## 2022-01-21 PROCEDURE — C1769 GUIDE WIRE: HCPCS | Performed by: UROLOGY

## 2022-01-21 PROCEDURE — 999N000141 HC STATISTIC PRE-PROCEDURE NURSING ASSESSMENT: Performed by: UROLOGY

## 2022-01-21 PROCEDURE — 250N000009 HC RX 250: Performed by: UROLOGY

## 2022-01-21 PROCEDURE — 250N000011 HC RX IP 250 OP 636: Performed by: ANESTHESIOLOGY

## 2022-01-21 PROCEDURE — 36415 COLL VENOUS BLD VENIPUNCTURE: CPT | Performed by: UROLOGY

## 2022-01-21 PROCEDURE — C2617 STENT, NON-COR, TEM W/O DEL: HCPCS | Performed by: UROLOGY

## 2022-01-21 PROCEDURE — 999N000179 XR SURGERY CARM FLUORO LESS THAN 5 MIN W STILLS: Mod: TC

## 2022-01-21 PROCEDURE — C1894 INTRO/SHEATH, NON-LASER: HCPCS | Performed by: UROLOGY

## 2022-01-21 PROCEDURE — 360N000076 HC SURGERY LEVEL 3, PER MIN: Performed by: UROLOGY

## 2022-01-21 PROCEDURE — 255N000002 HC RX 255 OP 636: Performed by: UROLOGY

## 2022-01-21 PROCEDURE — 250N000011 HC RX IP 250 OP 636: Performed by: NURSE ANESTHETIST, CERTIFIED REGISTERED

## 2022-01-21 PROCEDURE — 272N000001 HC OR GENERAL SUPPLY STERILE: Performed by: UROLOGY

## 2022-01-21 PROCEDURE — 52356 CYSTO/URETERO W/LITHOTRIPSY: CPT | Mod: RT | Performed by: UROLOGY

## 2022-01-21 PROCEDURE — 250N000011 HC RX IP 250 OP 636: Performed by: UROLOGY

## 2022-01-21 PROCEDURE — 710N000009 HC RECOVERY PHASE 1, LEVEL 1, PER MIN: Performed by: UROLOGY

## 2022-01-21 DEVICE — URETERAL STENT
Type: IMPLANTABLE DEVICE | Site: KIDNEY | Status: FUNCTIONAL
Brand: POLARIS™ ULTRA

## 2022-01-21 RX ORDER — OXYCODONE HYDROCHLORIDE 5 MG/1
5 TABLET ORAL EVERY 4 HOURS PRN
Status: DISCONTINUED | OUTPATIENT
Start: 2022-01-21 | End: 2022-01-21 | Stop reason: HOSPADM

## 2022-01-21 RX ORDER — ONDANSETRON 2 MG/ML
4 INJECTION INTRAMUSCULAR; INTRAVENOUS EVERY 30 MIN PRN
Status: DISCONTINUED | OUTPATIENT
Start: 2022-01-21 | End: 2022-01-21 | Stop reason: HOSPADM

## 2022-01-21 RX ORDER — SODIUM CHLORIDE, SODIUM LACTATE, POTASSIUM CHLORIDE, CALCIUM CHLORIDE 600; 310; 30; 20 MG/100ML; MG/100ML; MG/100ML; MG/100ML
INJECTION, SOLUTION INTRAVENOUS CONTINUOUS
Status: DISCONTINUED | OUTPATIENT
Start: 2022-01-21 | End: 2022-01-21 | Stop reason: HOSPADM

## 2022-01-21 RX ORDER — ACETAMINOPHEN 325 MG/1
975 TABLET ORAL ONCE
Status: COMPLETED | OUTPATIENT
Start: 2022-01-21 | End: 2022-01-21

## 2022-01-21 RX ORDER — IOPAMIDOL 612 MG/ML
INJECTION, SOLUTION INTRAVASCULAR PRN
Status: DISCONTINUED | OUTPATIENT
Start: 2022-01-21 | End: 2022-01-21 | Stop reason: HOSPADM

## 2022-01-21 RX ORDER — MEPERIDINE HYDROCHLORIDE 25 MG/ML
12.5 INJECTION INTRAMUSCULAR; INTRAVENOUS; SUBCUTANEOUS ONCE
Status: COMPLETED | OUTPATIENT
Start: 2022-01-21 | End: 2022-01-21

## 2022-01-21 RX ORDER — FENTANYL CITRATE 50 UG/ML
INJECTION, SOLUTION INTRAMUSCULAR; INTRAVENOUS PRN
Status: DISCONTINUED | OUTPATIENT
Start: 2022-01-21 | End: 2022-01-21

## 2022-01-21 RX ORDER — OXYCODONE HYDROCHLORIDE 5 MG/1
5 TABLET ORAL EVERY 6 HOURS PRN
Qty: 10 TABLET | Refills: 0 | Status: SHIPPED | OUTPATIENT
Start: 2022-01-21 | End: 2022-01-24

## 2022-01-21 RX ORDER — PROPOFOL 10 MG/ML
INJECTION, EMULSION INTRAVENOUS PRN
Status: DISCONTINUED | OUTPATIENT
Start: 2022-01-21 | End: 2022-01-21

## 2022-01-21 RX ORDER — HYDROMORPHONE HCL IN WATER/PF 6 MG/30 ML
0.4 PATIENT CONTROLLED ANALGESIA SYRINGE INTRAVENOUS EVERY 5 MIN PRN
Status: DISCONTINUED | OUTPATIENT
Start: 2022-01-21 | End: 2022-01-21 | Stop reason: HOSPADM

## 2022-01-21 RX ORDER — DEXAMETHASONE SODIUM PHOSPHATE 4 MG/ML
INJECTION, SOLUTION INTRA-ARTICULAR; INTRALESIONAL; INTRAMUSCULAR; INTRAVENOUS; SOFT TISSUE PRN
Status: DISCONTINUED | OUTPATIENT
Start: 2022-01-21 | End: 2022-01-21

## 2022-01-21 RX ORDER — ATROPA BELLADONNA AND OPIUM 16.2; 3 MG/1; MG/1
SUPPOSITORY RECTAL PRN
Status: DISCONTINUED | OUTPATIENT
Start: 2022-01-21 | End: 2022-01-21 | Stop reason: HOSPADM

## 2022-01-21 RX ORDER — TAMSULOSIN HYDROCHLORIDE 0.4 MG/1
0.4 CAPSULE ORAL DAILY
Qty: 7 CAPSULE | Refills: 0 | Status: SHIPPED | OUTPATIENT
Start: 2022-01-21 | End: 2022-03-31

## 2022-01-21 RX ORDER — SODIUM CHLORIDE, SODIUM LACTATE, POTASSIUM CHLORIDE, CALCIUM CHLORIDE 600; 310; 30; 20 MG/100ML; MG/100ML; MG/100ML; MG/100ML
INJECTION, SOLUTION INTRAVENOUS CONTINUOUS PRN
Status: DISCONTINUED | OUTPATIENT
Start: 2022-01-21 | End: 2022-01-21

## 2022-01-21 RX ORDER — ONDANSETRON 2 MG/ML
INJECTION INTRAMUSCULAR; INTRAVENOUS PRN
Status: DISCONTINUED | OUTPATIENT
Start: 2022-01-21 | End: 2022-01-21

## 2022-01-21 RX ORDER — CEFAZOLIN SODIUM 2 G/100ML
2 INJECTION, SOLUTION INTRAVENOUS
Status: COMPLETED | OUTPATIENT
Start: 2022-01-21 | End: 2022-01-21

## 2022-01-21 RX ORDER — ONDANSETRON 4 MG/1
4 TABLET, ORALLY DISINTEGRATING ORAL EVERY 30 MIN PRN
Status: DISCONTINUED | OUTPATIENT
Start: 2022-01-21 | End: 2022-01-21 | Stop reason: HOSPADM

## 2022-01-21 RX ORDER — CEFAZOLIN SODIUM 2 G/100ML
2 INJECTION, SOLUTION INTRAVENOUS SEE ADMIN INSTRUCTIONS
Status: DISCONTINUED | OUTPATIENT
Start: 2022-01-21 | End: 2022-01-21 | Stop reason: HOSPADM

## 2022-01-21 RX ORDER — OXYCODONE HYDROCHLORIDE 5 MG/1
5 TABLET ORAL EVERY 6 HOURS PRN
Qty: 5 TABLET | Refills: 0 | Status: SHIPPED | OUTPATIENT
Start: 2022-01-21 | End: 2022-01-24

## 2022-01-21 RX ORDER — FENTANYL CITRATE 0.05 MG/ML
50 INJECTION, SOLUTION INTRAMUSCULAR; INTRAVENOUS EVERY 5 MIN PRN
Status: DISCONTINUED | OUTPATIENT
Start: 2022-01-21 | End: 2022-01-21 | Stop reason: HOSPADM

## 2022-01-21 RX ADMIN — PHENYLEPHRINE HYDROCHLORIDE 100 MCG: 10 INJECTION INTRAVENOUS at 14:59

## 2022-01-21 RX ADMIN — MEPERIDINE HYDROCHLORIDE 12.5 MG: 25 INJECTION INTRAMUSCULAR; INTRAVENOUS; SUBCUTANEOUS at 16:51

## 2022-01-21 RX ADMIN — PHENYLEPHRINE HYDROCHLORIDE 100 MCG: 10 INJECTION INTRAVENOUS at 15:02

## 2022-01-21 RX ADMIN — FENTANYL CITRATE 50 MCG: 50 INJECTION, SOLUTION INTRAMUSCULAR; INTRAVENOUS at 15:51

## 2022-01-21 RX ADMIN — PHENYLEPHRINE HYDROCHLORIDE 100 MCG: 10 INJECTION INTRAVENOUS at 14:43

## 2022-01-21 RX ADMIN — PHENYLEPHRINE HYDROCHLORIDE 100 MCG: 10 INJECTION INTRAVENOUS at 14:53

## 2022-01-21 RX ADMIN — PHENYLEPHRINE HYDROCHLORIDE 100 MCG: 10 INJECTION INTRAVENOUS at 15:05

## 2022-01-21 RX ADMIN — OXYCODONE HYDROCHLORIDE 5 MG: 5 TABLET ORAL at 16:19

## 2022-01-21 RX ADMIN — DEXAMETHASONE SODIUM PHOSPHATE 4 MG: 4 INJECTION, SOLUTION INTRA-ARTICULAR; INTRALESIONAL; INTRAMUSCULAR; INTRAVENOUS; SOFT TISSUE at 14:14

## 2022-01-21 RX ADMIN — PHENYLEPHRINE HYDROCHLORIDE 100 MCG: 10 INJECTION INTRAVENOUS at 14:40

## 2022-01-21 RX ADMIN — PHENYLEPHRINE HYDROCHLORIDE 100 MCG: 10 INJECTION INTRAVENOUS at 14:47

## 2022-01-21 RX ADMIN — FENTANYL CITRATE 50 MCG: 50 INJECTION, SOLUTION INTRAMUSCULAR; INTRAVENOUS at 14:09

## 2022-01-21 RX ADMIN — PHENYLEPHRINE HYDROCHLORIDE 100 MCG: 10 INJECTION INTRAVENOUS at 14:28

## 2022-01-21 RX ADMIN — CEFAZOLIN SODIUM 2 G: 2 INJECTION, SOLUTION INTRAVENOUS at 14:01

## 2022-01-21 RX ADMIN — SODIUM CHLORIDE, POTASSIUM CHLORIDE, SODIUM LACTATE AND CALCIUM CHLORIDE: 600; 310; 30; 20 INJECTION, SOLUTION INTRAVENOUS at 16:54

## 2022-01-21 RX ADMIN — FENTANYL CITRATE 50 MCG: 50 INJECTION, SOLUTION INTRAMUSCULAR; INTRAVENOUS at 14:29

## 2022-01-21 RX ADMIN — ONDANSETRON 4 MG: 2 INJECTION INTRAMUSCULAR; INTRAVENOUS at 14:14

## 2022-01-21 RX ADMIN — FENTANYL CITRATE 50 MCG: 50 INJECTION, SOLUTION INTRAMUSCULAR; INTRAVENOUS at 16:06

## 2022-01-21 RX ADMIN — PROPOFOL 150 MG: 10 INJECTION, EMULSION INTRAVENOUS at 14:09

## 2022-01-21 RX ADMIN — PHENYLEPHRINE HYDROCHLORIDE 100 MCG: 10 INJECTION INTRAVENOUS at 14:34

## 2022-01-21 RX ADMIN — MIDAZOLAM 2 MG: 1 INJECTION INTRAMUSCULAR; INTRAVENOUS at 14:05

## 2022-01-21 RX ADMIN — ACETAMINOPHEN 975 MG: 325 TABLET, FILM COATED ORAL at 18:02

## 2022-01-21 RX ADMIN — SODIUM CHLORIDE, POTASSIUM CHLORIDE, SODIUM LACTATE AND CALCIUM CHLORIDE: 600; 310; 30; 20 INJECTION, SOLUTION INTRAVENOUS at 13:58

## 2022-01-21 ASSESSMENT — ENCOUNTER SYMPTOMS
SEIZURES: 0
DYSRHYTHMIAS: 0
ORTHOPNEA: 0

## 2022-01-21 ASSESSMENT — MIFFLIN-ST. JEOR: SCORE: 1834.5

## 2022-01-21 NOTE — ANESTHESIA PROCEDURE NOTES
Airway      Staff -        Anesthesiologist:  Artie Giron MD       CRNA: Daniella David APRN CRNA       Performed By: CRNA  Consent for Airway        Urgency: elective  Indications and Patient Condition       Indications for airway management: delma-procedural        Final Airway Details       Final airway type: supraglottic airway    Supraglottic Airway Details        Type: LMA       Brand: Ambu AuraGain       LMA size: 5    Post intubation assessment        Placement verified by: capnometry, equal breath sounds and chest rise        Number of attempts at approach: 1       Number of other approaches attempted: 0       Ease of procedure: easy       Dentition: Intact

## 2022-01-21 NOTE — ANESTHESIA CARE TRANSFER NOTE
Patient: Magdaleno Marie    Procedure: Procedure(s):  Cystoscopy, right ureteroscopy with holmium laser lithotripsy, right ureteral stent placement,       Diagnosis: Kidney stone [N20.0]  Diagnosis Additional Information: No value filed.    Anesthesia Type:   General     Note:    Oropharynx: oropharynx clear of all foreign objects and spontaneously breathing  Level of Consciousness: awake  Oxygen Supplementation: face mask  Level of Supplemental Oxygen (L/min / FiO2): 6  Independent Airway: airway patency satisfactory and stable  Dentition: dentition unchanged  Vital Signs Stable: post-procedure vital signs reviewed and stable  Report to RN Given: handoff report given  Patient transferred to: PACU    Handoff Report: Identifed the Patient, Identified the Reponsible Provider, Reviewed the pertinent medical history, Discussed the surgical course, Reviewed Intra-OP anesthesia mangement and issues during anesthesia, Set expectations for post-procedure period and Allowed opportunity for questions and acknowledgement of understanding      Vitals:  Vitals Value Taken Time   /83 01/21/22 1523   Temp     Pulse 80 01/21/22 1525   Resp 17 01/21/22 1525   SpO2 98 % 01/21/22 1525   Vitals shown include unvalidated device data.    Electronically Signed By: ДМИТРИЙ Lomeli CRNA  January 21, 2022  3:26 PM

## 2022-01-21 NOTE — ANESTHESIA POSTPROCEDURE EVALUATION
Patient: Magdaleno Marie    Procedure: Procedure(s):  Cystoscopy, right ureteroscopy with holmium laser lithotripsy, right ureteral stent placement,       Diagnosis:Kidney stone [N20.0]  Diagnosis Additional Information: No value filed.    Anesthesia Type:  General    Note:     Postop Pain Control: Uneventful            Sign Out: Well controlled pain   PONV: No   Neuro/Psych: Uneventful            Sign Out: Acceptable/Baseline neuro status   Airway/Respiratory: Uneventful            Sign Out: Acceptable/Baseline resp. status   CV/Hemodynamics: Uneventful            Sign Out: Acceptable CV status; No obvious hypovolemia; No obvious fluid overload   Other NRE: NONE   DID A NON-ROUTINE EVENT OCCUR? No           Last vitals:  Vitals Value Taken Time   /81 01/21/22 1710   Temp 35.9  C (96.6  F) 01/21/22 1650   Pulse 61 01/21/22 1716   Resp 46 01/21/22 1716   SpO2 97 % 01/21/22 1716   Vitals shown include unvalidated device data.    Electronically Signed By: Artie Giron MD  January 21, 2022  5:17 PM

## 2022-01-21 NOTE — OR NURSING
Patient sitting up on cart and blood pressure is quite low.  Patient brought into lying position.  Dr. Giron informed.  Dr. Giron at bedside.

## 2022-01-21 NOTE — OP NOTE
Procedure Date: 01/21/2022    ATTENDING SURGEON:  Arnel Andrade MD.    PREOPERATIVE DIAGNOSIS:  Right kidney stones.    POSTOPERATIVE DIAGNOSIS:  Right kidney stones.    PROCEDURES PERFORMED:  1.  Cystoscopy.  2.  Right retrograde pyelogram.  3.  Interpretation of fluoroscopic images.  4.  Right ureteroscopy with holmium laser lithotripsy and stone basketing.  5.  Placement of 5 x 26 double-J right ureteral stent.    ANESTHESIA:  General.    COMPLICATIONS:  None.    INDICATIONS FOR PROCEDURE:  Magdaleno Marie is a 66-year-old gentleman with an extensive past history of kidney stones.  He has 2 stones in his right kidney and presents for elective ureteroscopy.    DETAILS OF PROCEDURE:  Risks and benefits of the procedure were explained in detail to the patient, and informed consent was obtained.  The patient was brought to the operating room and placed supine on the table.  He underwent general endotracheal anesthetic.  He was then moved down to the dorsal lithotomy position, where he was prepped and draped in the standard sterile fashion.  The procedure began by introducing the 22-Finnish rigid cystoscope through the urethra and into the bladder to perform cystoscopy.  There were no urothelial abnormalities identified.  The right ureteral orifice was identified and cannulated with ureteral catheter.  Retrograde pyelogram was performed, and it was found to be normal, with no filling defects or dilatation present.  I passed a Glidewire into the right kidney and backed off the ureteral catheter along the scope.  Alongside the Glidewire, I passed the rigid ureteroscope through the urethra and bladder and up the right ureter.  The right ureter was free of any stones.  I passed a second Glidewire into the right kidney and backloaded it off the rigid scope.  Over this second Glidewire, I passed a ureteral access sheath.  I first tried a 13/15-Finnish ureteral access sheath, but it would not fit, so I then downsized to  an 11/13-Palauan ureteral access sheath and this passed without any resistance.  I removed the inner Glidewire and the inner stylette, leaving the safety wire in place.  I then passed the Storz flexible ureteroscope through the access sheath and performed a complete renoscopy.  There were 2 stones in the right kidney.  One stone in the middle pole was basketed out without difficulty.  There was one stone in the lower pole that need to be broken up with a 200 micron holmium laser fiber.  I then basketed out multiple fragments through the sheath.  Once all fragments were removed, I performed another retrograde pyelogram through the scope and performed complete renoscopy to make certain all stones had been removed.  I then removed the ureteroscope along with the ureteral access sheath.  Over the remaining Glidewire, I passed the rigid cystoscope through the urethra and into the bladder to visualize the right orifice.  I then passed a 5 x 26 double-J ureteral stent wire.  The wire was pulled back and a good curl could be seen in the pelvis on fluoroscopy.  A good curl was seen in the bladder under direct visualization.  The bladder was drained. The scope was removed.  I placed a B and O suppository at the end the case.  The procedure was concluded.    The patient tolerated the procedure without complications.  He went to postanesthetic care unit in good condition.  He will go home from there.  I will see him back next week for stent removal in the office.    Arnel Andrade MD        D: 2022   T: 2022   MT: MarandaA    Name:     DANNIE JONES  MRN:      [id number]        Account:        [id number]   :      1955           Procedure Date: 2022     Document: [id number]

## 2022-01-22 NOTE — DISCHARGE INSTRUCTIONS
Today you were given 975 mg of Tylenol at 6:00 PM. The recommended daily maximum dose is 4000 mg. Hand written on original discharge sheet.       Same Day Surgery Discharge Instructions for  Sedation and General Anesthesia       It's not unusual to feel dizzy, light-headed or faint for up to 24 hours after surgery or while taking pain medication.  If you have these symptoms: sit for a few minutes before standing and have someone assist you when you get up to walk or use the bathroom.      You should rest and relax for the next 24 hours. We recommend you make arrangements to have an adult stay with you for at least 24 hours after your discharge.  Avoid hazardous and strenuous activity.      DO NOT DRIVE any vehicle or operate mechanical equipment for 24 hours following the end of your surgery.  Even though you may feel normal, your reactions may be affected by the medication you have received.      Do not drink alcoholic beverages for 24 hours following surgery.       Slowly progress to your regular diet as you feel able. It's not unusual to feel nauseated and/or vomit after receiving anesthesia.  If you develop these symptoms, drink clear liquids (apple juice, ginger ale, broth, 7-up, etc. ) until you feel better.  If your nausea and vomiting persists for 24 hours, please notify your surgeon.        All narcotic pain medications, along with inactivity and anesthesia, can cause constipation. Drinking plenty of liquids and increasing fiber intake will help.      For any questions of a medical nature, call your surgeon.      Do not make important decisions for 24 hours.      If you had general anesthesia, you may have a sore throat for a couple of days related to the breathing tube used during surgery.  You may use Cepacol lozenges to help with this discomfort.  If it worsens or if you develop a fever, contact your surgeon.       If you feel your pain is not well managed with the pain medications prescribed by your  surgeon, please contact your surgeon's office to let them know so they can address your concerns.       CoVid 19 Information    We want to give you information regarding Covid. Please consult your primary care provider with any questions you might have.     Patient who have symptoms (cough, fever, or shortness of breath), need to isolate for 7 days from when symptoms started OR 72 hours after fever resolves (without fever reducing medications) AND improvement of respiratory symptoms (whichever is longer).      Isolate yourself at home (in own room/own bathroom if possible)    Do Not allow any visitors    Do Not go to work or school    Do Not go to [employer],  centers, shopping, or other public places.    Do Not shake hands.    Avoid close and intimate contact with others (hugging, kissing).    Follow CDC recommendations for household cleaning of frequently touched services.     After the initial 7 days, continue to isolate yourself from household members as much as possible. To continue decrease the risk of community spread and exposure, you and any members of your household should limit activities in public for 14 days after starting home isolation.     You can reference the following CDC link for helpful home isolation/care tips:  https://www.cdc.gov/coronavirus/2019-ncov/downloads/10Things.pdf    Protect Others:    Cover Your Mouth and Nose with a mask, disposable tissue or wash cloth to avoid spreading germs to others.    Wash your hands and face frequently with soap and water    Call Your Primary Doctor If: Breathing difficulty develops or you become worse.    For more information about COVID19 and options for caring for yourself at home, please visit the CDC website at https://www.cdc.gov/coronavirus/2019-ncov/about/steps-when-sick.html  For more options for care at [facility], please visit our website at [email or web address]        Cystoscopy, Holmium Laser with Stent  Placement Discharge Instructions    Holmium laser lithotripsy was used to break up your kidney stone(s). It is normal to have visible blood in the urine, burning, urgency and frequency following this procedure. These symptoms may last for a few days to weeks.     Diet:    To help pass stone fragments and clear the blood out of the urine, it is important to drink 6-8 glasses of fluids per day at home - at least 3-4 glasses should be water.       Return to the diet that you were on before the procedure, unless you are given specific diet instructions.    Activity:    Walk short distances and increase as your strength allows.    You may climb stairs.    Do not do strenuous exercise or heavy lifting until approved by surgeon.    Do not drive while taking narcotic pain medications.    Bathing:    You may take a shower.          Stent information    During surgery, a stent was placed in the ureter.  The ureter is the tube that drains urine from the kidney to the bladder.  The stent is placed to dilate (open) the ureter so the stone fragments can pass easily through the ureter or to decrease ureteral swelling after surgery, or to relieve an obstruction. The stent is made of rubber. The upper end of the stent curls in the kidney while the lower end rests in the bladder.    While the stent is in place you may experience the following symptoms:    Blood and/or small blood clots in urine.    Bladder spasm (frequency and urgency of urination).    Discomfort or aching in the back or side where the stent is.    Burning or discomfort at the end of urine stream.    To decrease these symptoms you should:    Take pain medication as prescribed.    Drink plenty of fluids.    If you experience pain at the end of urination try not emptying your bladder completely.    If having discomfort in back or side, decrease activity.    Call your physician if these signs/symptoms are present:    Pain that is not relieved by a short rest or ordered  pain medications.    Temperature at or above 101.0 F or chills.    Inability or difficulty urinating.     Excessive blood in urine.    Any questions or concerns.        **If you have questions or concerns about your procedure,   call Dr. Andrdae at [phone]**

## 2022-01-22 NOTE — OR NURSING
Pt dressed, up in recliner and transported to Phase 2. Up to BR- voids pink/ed X2- denies dizziness or other c.o- tylenol 975 mg po for c/o- discharge instructions by phone per Hospital COVID Protocol giancarlo Singh. Note- dischrge medications from pharmacy- no oxycodone in packet- Called Dr Mario office- the on call Dr will drop by and fill script for pain medications for pt to go home.

## 2022-01-24 LAB
APPEARANCE STONE: NORMAL
COMPN STONE: NORMAL
SPECIMEN WT: 21 MG

## 2022-01-25 DIAGNOSIS — C61 PROSTATE CANCER (H): Primary | ICD-10-CM

## 2022-01-27 ENCOUNTER — OFFICE VISIT (OUTPATIENT)
Dept: UROLOGY | Facility: CLINIC | Age: 67
End: 2022-01-27
Payer: COMMERCIAL

## 2022-01-27 VITALS
BODY MASS INDEX: 31.5 KG/M2 | DIASTOLIC BLOOD PRESSURE: 70 MMHG | HEIGHT: 71 IN | WEIGHT: 225 LBS | SYSTOLIC BLOOD PRESSURE: 130 MMHG

## 2022-01-27 DIAGNOSIS — N20.0 KIDNEY STONE: Primary | ICD-10-CM

## 2022-01-27 DIAGNOSIS — C61 PROSTATE CANCER (H): ICD-10-CM

## 2022-01-27 DIAGNOSIS — Z79.2 PROPHYLACTIC ANTIBIOTIC: ICD-10-CM

## 2022-01-27 PROCEDURE — 99215 OFFICE O/P EST HI 40 MIN: CPT | Mod: 25 | Performed by: UROLOGY

## 2022-01-27 PROCEDURE — 52310 CYSTOSCOPY AND TREATMENT: CPT | Performed by: UROLOGY

## 2022-01-27 RX ORDER — LIDOCAINE HYDROCHLORIDE 20 MG/ML
JELLY TOPICAL ONCE
Status: DISCONTINUED | OUTPATIENT
Start: 2022-01-27 | End: 2022-01-28 | Stop reason: HOSPADM

## 2022-01-27 RX ORDER — CIPROFLOXACIN 500 MG/1
500 TABLET, FILM COATED ORAL ONCE
Qty: 1 TABLET | Refills: 0 | Status: SHIPPED | OUTPATIENT
Start: 2022-01-27 | End: 2022-01-27

## 2022-01-27 ASSESSMENT — PAIN SCALES - GENERAL: PAINLEVEL: NO PAIN (0)

## 2022-01-27 ASSESSMENT — MIFFLIN-ST. JEOR: SCORE: 1812.72

## 2022-01-27 NOTE — NURSING NOTE
Chief Complaint   Patient presents with     Right Ureteral     Here for a in office cystoscopy to get stent removed     Prior to the start of the procedure and with procedural staff participation, I verbally confirmed the patient s identity using two indicators, relevant allergies, that the procedure was appropriate and matched the consent or emergent situation, and that the correct equipment/implants were available. Immediately prior to starting the procedure I conducted the Time Out with the procedural staff and re-confirmed the patient s name, procedure, and site/side. I have wiped the patient off with the povidone-Iodine solution, draped them,  used Lidocaine hydrochloride jelly, and instilled sterile water into the bladder. (The Joint Commission universal protocol was followed.)  Yes    Sedation (Moderate or Deep): Urojet    5mL 2% lidocaine hydrochloride Urojet instilled into urethra.    NDC# [address]-7  Lot #: [id number]  Expiration Date:  7-22    Maryuri Nicole

## 2022-01-27 NOTE — PROGRESS NOTES
Office Visit Note  [facility]  [phone]    UROLOGIC DIAGNOSES:   Kidney stones  Intermediate risk prostate cancer    CURRENT INTERVENTIONS:   S/P ureteroscopy    HISTORY:   Magdaleno returns to clinic today for stent removal after ureteroscopy.  He says that this time the stent has not bothered much and he has felt well.  Stone was composed of uric acid mixed with calcium oxalate.  He has an appointment set up from the spring with the kidney stone prevention clinic.      PAST MEDICAL HISTORY:   Past Medical History:   Diagnosis Date     Calculus of kidney     kidney stones     Hypertension        PAST SURGICAL HISTORY:   Past Surgical History:   Procedure Laterality Date     COLONOSCOPY       COMBINED CYSTOSCOPY, RETROGRADES, URETEROSCOPY, LASER HOLMIUM LITHOTRIPSY URETER(S), INSERT STENT Left 7/28/2016    Procedure: COMBINED CYSTOSCOPY, RETROGRADES, URETEROSCOPY, LASER HOLMIUM LITHOTRIPSY URETER(S), INSERT STENT;  Surgeon: Arnel Andrade MD;  Location:  OR     COMBINED CYSTOSCOPY, RETROGRADES, URETEROSCOPY, LASER HOLMIUM LITHOTRIPSY URETER(S), INSERT STENT Bilateral 9/9/2019    Procedure: Cystoscopy, bilateral retrograde pyelograms, interpretation of fluoroscopic images, bilateral ureteroscopy with holmium laser lithotripsy and stone basketing, placement of 5 x 26 double-J ureteral stents bilaterally;  Surgeon: Arnel Andrade MD;  Location:  OR     CYSTOSCOPY       GENITOURINARY SURGERY  1980's    shock wave lithotripsy     LASER HOLMIUM LITHOTRIPSY URETER(S), INSERT STENT, COMBINED Right 1/21/2022    Procedure: Cystoscopy, right ureteroscopy with holmium laser lithotripsy, right ureteral stent placement,;  Surgeon: Arnel Andrade MD;  Location:  OR     ORTHOPEDIC SURGERY      Torn meniscus 2021       FAMILY HISTORY: History reviewed. No pertinent family history.    SOCIAL HISTORY:   Social History     Socioeconomic History     Marital status:      Spouse  "name: None     Number of children: None     Years of education: None     Highest education level: None   Occupational History     None   Tobacco Use     Smoking status: Never Smoker     Smokeless tobacco: Never Used   Vaping Use     Vaping Use: None   Substance and Sexual Activity     Alcohol use: Yes     Comment: 12-14 drinks per  week     Drug use: No     Sexual activity: Yes   Other Topics Concern     Parent/sibling w/ CABG, MI or angioplasty before 65F 55M? Not Asked   Social History Narrative     None     Social Determinants of Health     Financial Resource Strain: Not on file   Food Insecurity: Not on file   Transportation Needs: Not on file   Physical Activity: Not on file   Stress: Not on file   Social Connections: Not on file   Intimate Partner Violence: Not on file   Housing Stability: Not on file       Review Of Systems:  Skin: No rash, pruritis, or skin pigmentation  Eyes: No changes in vision  Ears/Nose/Throat: No changes in hearing, no nosebleeds  Respiratory: No shortness of breath, dyspnea on exertion, cough, or hemoptysis  Cardiovascular: No chest pain or palpitations  Gastrointestinal: No diarrhea or constipation. No abdominal pain. No hematochezia  Genitourinary: see HPI  Musculoskeletal: No pain or swelling of joints, normal range of motion  Neurologic: No weakness or tremors  Psychiatric: No recent changes in memory or mood  Hematologic/Lymphatic/Immunologic: No easy bruising or enlarged lymph nodes  Endocrine: No weight gain or loss      PHYSICAL EXAM:    /70   Ht 1.803 m (5' 11\")   Wt 102.1 kg (225 lb)   BMI 31.38 kg/m      Constitutional: Well developed. Conversant and in no acute distress  Eyes: Anicteric sclera, conjunctiva clear, normal extraocular movements  ENT: Normocephalic and atraumatic,   Skin: Warm and dry. No rashes or lesions  Cardiac: No peripheral edema  Back/Flank: Not done  CNS/PNS: Normal musculature and movements, moves all extremities normally  Respiratory: Normal " non-labored breathing  Abdomen: Soft nontender and nondistended  Peripheral Vascular: No peripheral edema  Mental Status/Psych: Alert and Oriented x 3. Normal mood and affect    Penis: Not done  Scrotal Skin: Not done  Testicles: Not done  Epididymis: Not done  Digital Rectal Exam:     Cystoscopy: I performed flexible cystoscopy and removed the stent without difficulty    Imaging: None    Urinalysis: UA RESULTS:  Recent Labs   Lab Test 11/02/21  1353 05/28/16  1424   COLOR Yellow Light Yellow   APPEARANCE Clear Clear   URINEGLC Negative Negative   URINEBILI Negative Negative   URINEKETONE Negative Negative   SG 1.025 1.003   UBLD Negative Moderate*   URINEPH 7.0 6.5   PROTEIN Negative Negative   UROBILINOGEN 0.2  --    NITRITE Negative Negative   LEUKEST Negative Negative   RBCU  --  26*   WBCU  --  <1       PSA: 9.3    Post Void Residual:     Other labs: None today      IMPRESSION:  Stent removed, intermediate risk prostate cancer    PLAN:  His stent was removed in clinic today.  We did discuss prevention methods for future stones.  He'll continue to take the potassium citrate until he sees the kidney stone prevention clinic in the spring.    After stent removal today we did  the discussion again when he left off regarding his prostate cancer.  We discussed treatment options again for his prostate cancer.  We discussed the pros and cons of each option.  He wishes to proceed with robotic assisted laparoscopic radical prostatectomy with bilateral pelvic lymph node dissection.  We discussed the risks of surgery and expected recovery.  We discussed Kegel exercises.  He would like to have a nerve sparing procedure performed.  He understands that he may require further therapy even after the radical prostatectomy.  He wishes to proceed.    We'll get him scheduled for robotic assisted laparoscopic radical prostatectomy with bilateral pelvic lymph node dissection in the operating room later this spring.    Total  time spent today in review of outside records and test results, discussion with the patient, and documentation: 45 minutes      Arnel Andrade M.D.

## 2022-01-27 NOTE — PATIENT INSTRUCTIONS

## 2022-01-27 NOTE — LETTER
1/27/2022       RE: Magdaleno Marie  [address]  [place] [address]     Dear Colleague,    Thank you for referring your patient, Magdaleno Marie, to the [facility] at [facility]. Please see a copy of my visit note below.    Office Visit Note  [facility]  [phone]    UROLOGIC DIAGNOSES:   Kidney stones  Intermediate risk prostate cancer    CURRENT INTERVENTIONS:   S/P ureteroscopy    HISTORY:   Magdaleno returns to clinic today for stent removal after ureteroscopy.  He says that this time the stent has not bothered much and he has felt well.  Stone was composed of uric acid mixed with calcium oxalate.  He has an appointment set up from the spring with the kidney stone prevention clinic.      PAST MEDICAL HISTORY:   Past Medical History:   Diagnosis Date     Calculus of kidney     kidney stones     Hypertension        PAST SURGICAL HISTORY:   Past Surgical History:   Procedure Laterality Date     COLONOSCOPY       COMBINED CYSTOSCOPY, RETROGRADES, URETEROSCOPY, LASER HOLMIUM LITHOTRIPSY URETER(S), INSERT STENT Left 7/28/2016    Procedure: COMBINED CYSTOSCOPY, RETROGRADES, URETEROSCOPY, LASER HOLMIUM LITHOTRIPSY URETER(S), INSERT STENT;  Surgeon: Arnel Andrade MD;  Location: [facility] OR     COMBINED CYSTOSCOPY, RETROGRADES, URETEROSCOPY, LASER HOLMIUM LITHOTRIPSY URETER(S), INSERT STENT Bilateral 9/9/2019    Procedure: Cystoscopy, bilateral retrograde pyelograms, interpretation of fluoroscopic images, bilateral ureteroscopy with holmium laser lithotripsy and stone basketing, placement of 5 x 26 double-J ureteral stents bilaterally;  Surgeon: Arnel Andrade MD;  Location: [facility] OR     CYSTOSCOPY       GENITOURINARY SURGERY  1980's    shock wave lithotripsy     LASER HOLMIUM LITHOTRIPSY URETER(S), INSERT STENT, COMBINED Right 1/21/2022    Procedure: Cystoscopy, right ureteroscopy with holmium  "laser lithotripsy, right ureteral stent placement,;  Surgeon: Arnel Andrade MD;  Location: [facility] OR     ORTHOPEDIC SURGERY      Torn meniscus 2021       FAMILY HISTORY: History reviewed. No pertinent family history.    SOCIAL HISTORY:   Social History     Socioeconomic History     Marital status:      Spouse name: None     Number of children: None     Years of education: None     Highest education level: None   Occupational History     None   Tobacco Use     Smoking status: Never Smoker     Smokeless tobacco: Never Used   Vaping Use     Vaping Use: None   Substance and Sexual Activity     Alcohol use: Yes     Comment: 12-14 drinks per  week     Drug use: No     Sexual activity: Yes   Other Topics Concern     Parent/sibling w/ CABG, MI or angioplasty before 65F 55M? Not Asked   Social History Narrative     None     Social Determinants of Health     Financial Resource Strain: Not on file   Food Insecurity: Not on file   Transportation Needs: Not on file   Physical Activity: Not on file   Stress: Not on file   Social Connections: Not on file   Intimate Partner Violence: Not on file   Housing Stability: Not on file       Review Of Systems:  Skin: No rash, pruritis, or skin pigmentation  Eyes: No changes in vision  Ears/Nose/Throat: No changes in hearing, no nosebleeds  Respiratory: No shortness of breath, dyspnea on exertion, cough, or hemoptysis  Cardiovascular: No chest pain or palpitations  Gastrointestinal: No diarrhea or constipation. No abdominal pain. No hematochezia  Genitourinary: see HPI  Musculoskeletal: No pain or swelling of joints, normal range of motion  Neurologic: No weakness or tremors  Psychiatric: No recent changes in memory or mood  Hematologic/Lymphatic/Immunologic: No easy bruising or enlarged lymph nodes  Endocrine: No weight gain or loss      PHYSICAL EXAM:    /70   Ht 1.803 m (5' 11\")   Wt 102.1 kg (225 lb)   BMI 31.38 kg/m      Constitutional: Well developed. Conversant " and in no acute distress  Eyes: Anicteric sclera, conjunctiva clear, normal extraocular movements  ENT: Normocephalic and atraumatic,   Skin: Warm and dry. No rashes or lesions  Cardiac: No peripheral edema  Back/Flank: Not done  CNS/PNS: Normal musculature and movements, moves all extremities normally  Respiratory: Normal non-labored breathing  Abdomen: Soft nontender and nondistended  Peripheral Vascular: No peripheral edema  Mental Status/Psych: Alert and Oriented x 3. Normal mood and affect    Penis: Not done  Scrotal Skin: Not done  Testicles: Not done  Epididymis: Not done  Digital Rectal Exam:     Cystoscopy: I performed flexible cystoscopy and removed the stent without difficulty    Imaging: None    Urinalysis: UA RESULTS:  Recent Labs   Lab Test 11/02/21  1353 05/28/16  1424   COLOR Yellow Light Yellow   APPEARANCE Clear Clear   URINEGLC Negative Negative   URINEBILI Negative Negative   URINEKETONE Negative Negative   SG 1.025 1.003   UBLD Negative Moderate*   URINEPH 7.0 6.5   PROTEIN Negative Negative   UROBILINOGEN 0.2  --    NITRITE Negative Negative   LEUKEST Negative Negative   RBCU  --  26*   WBCU  --  <1       PSA: 9.3    Post Void Residual:     Other labs: None today      IMPRESSION:  Stent removed, intermediate risk prostate cancer    PLAN:  His stent was removed in clinic today.  We did discuss prevention methods for future stones.  He'll continue to take the potassium citrate until he sees the kidney stone prevention clinic in the spring.    After stent removal today we did  the discussion again when he left off regarding his prostate cancer.  We discussed treatment options again for his prostate cancer.  We discussed the pros and cons of each option.  He wishes to proceed with robotic assisted laparoscopic radical prostatectomy with bilateral pelvic lymph node dissection.  We discussed the risks of surgery and expected recovery.  We discussed Kegel exercises.  He would like to have a  nerve sparing procedure performed.  He understands that he may require further therapy even after the radical prostatectomy.  He wishes to proceed.    We'll get him scheduled for robotic assisted laparoscopic radical prostatectomy with bilateral pelvic lymph node dissection in the operating room later this spring.    Total time spent today in review of outside records and test results, discussion with the patient, and documentation: 45 minutes      Arnel Andrade M.D.

## 2022-02-21 DIAGNOSIS — Z11.59 ENCOUNTER FOR SCREENING FOR OTHER VIRAL DISEASES: Primary | ICD-10-CM

## 2022-03-30 ENCOUNTER — ANESTHESIA EVENT (OUTPATIENT)
Dept: SURGERY | Facility: CLINIC | Age: 67
End: 2022-03-30
Payer: COMMERCIAL

## 2022-03-31 ENCOUNTER — ANESTHESIA (OUTPATIENT)
Dept: SURGERY | Facility: CLINIC | Age: 67
End: 2022-03-31
Payer: COMMERCIAL

## 2022-03-31 ENCOUNTER — HOSPITAL ENCOUNTER (OUTPATIENT)
Facility: CLINIC | Age: 67
Discharge: HOME | End: 2022-04-01
Attending: UROLOGY | Admitting: UROLOGY
Payer: COMMERCIAL

## 2022-03-31 DIAGNOSIS — C61 PROSTATE CANCER (H): Primary | ICD-10-CM

## 2022-03-31 LAB
ABO/RH(D): NORMAL
ANTIBODY SCREEN: NEGATIVE
CREAT SERPL-MCNC: 1.23 MG/DL (ref 0.66–1.25)
GFR SERPL CREATININE-BSD FRML MDRD: 64 ML/MIN/1.73M2
POTASSIUM BLD-SCNC: 3.9 MMOL/L (ref 3.4–5.3)
SPECIMEN EXPIRATION DATE: NORMAL

## 2022-03-31 PROCEDURE — 250N000011 HC RX IP 250 OP 636: Performed by: STUDENT IN AN ORGANIZED HEALTH CARE EDUCATION/TRAINING PROGRAM

## 2022-03-31 PROCEDURE — 258N000003 HC RX IP 258 OP 636: Performed by: ANESTHESIOLOGY

## 2022-03-31 PROCEDURE — 370N000017 HC ANESTHESIA TECHNICAL FEE, PER MIN: Performed by: UROLOGY

## 2022-03-31 PROCEDURE — 82565 ASSAY OF CREATININE: CPT | Performed by: ANESTHESIOLOGY

## 2022-03-31 PROCEDURE — 999N000141 HC STATISTIC PRE-PROCEDURE NURSING ASSESSMENT: Performed by: UROLOGY

## 2022-03-31 PROCEDURE — 84132 ASSAY OF SERUM POTASSIUM: CPT | Performed by: ANESTHESIOLOGY

## 2022-03-31 PROCEDURE — 250N000011 HC RX IP 250 OP 636: Performed by: NURSE ANESTHETIST, CERTIFIED REGISTERED

## 2022-03-31 PROCEDURE — 250N000011 HC RX IP 250 OP 636: Performed by: ANESTHESIOLOGY

## 2022-03-31 PROCEDURE — 250N000025 HC SEVOFLURANE, PER MIN: Performed by: UROLOGY

## 2022-03-31 PROCEDURE — 55866 LAPS SURG PRST8ECT RPBIC RAD: CPT | Performed by: UROLOGY

## 2022-03-31 PROCEDURE — 360N000080 HC SURGERY LEVEL 7, PER MIN: Performed by: UROLOGY

## 2022-03-31 PROCEDURE — 272N000001 HC OR GENERAL SUPPLY STERILE: Performed by: UROLOGY

## 2022-03-31 PROCEDURE — 86901 BLOOD TYPING SEROLOGIC RH(D): CPT | Performed by: ANESTHESIOLOGY

## 2022-03-31 PROCEDURE — 250N000009 HC RX 250: Performed by: UROLOGY

## 2022-03-31 PROCEDURE — 258N000003 HC RX IP 258 OP 636: Performed by: NURSE ANESTHETIST, CERTIFIED REGISTERED

## 2022-03-31 PROCEDURE — 38571 LAPAROSCOPY LYMPHADENECTOMY: CPT | Mod: 51 | Performed by: UROLOGY

## 2022-03-31 PROCEDURE — 250N000009 HC RX 250: Performed by: NURSE ANESTHETIST, CERTIFIED REGISTERED

## 2022-03-31 PROCEDURE — 36415 COLL VENOUS BLD VENIPUNCTURE: CPT | Performed by: ANESTHESIOLOGY

## 2022-03-31 PROCEDURE — 250N000011 HC RX IP 250 OP 636: Performed by: UROLOGY

## 2022-03-31 PROCEDURE — 88309 TISSUE EXAM BY PATHOLOGIST: CPT | Mod: TC | Performed by: UROLOGY

## 2022-03-31 PROCEDURE — 258N000003 HC RX IP 258 OP 636: Performed by: STUDENT IN AN ORGANIZED HEALTH CARE EDUCATION/TRAINING PROGRAM

## 2022-03-31 PROCEDURE — 710N000009 HC RECOVERY PHASE 1, LEVEL 1, PER MIN: Performed by: UROLOGY

## 2022-03-31 PROCEDURE — 250N000013 HC RX MED GY IP 250 OP 250 PS 637: Performed by: STUDENT IN AN ORGANIZED HEALTH CARE EDUCATION/TRAINING PROGRAM

## 2022-03-31 RX ORDER — DEXMEDETOMIDINE HYDROCHLORIDE 4 UG/ML
INJECTION, SOLUTION INTRAVENOUS PRN
Status: DISCONTINUED | OUTPATIENT
Start: 2022-03-31 | End: 2022-03-31

## 2022-03-31 RX ORDER — GLYCOPYRROLATE 0.2 MG/ML
INJECTION, SOLUTION INTRAMUSCULAR; INTRAVENOUS PRN
Status: DISCONTINUED | OUTPATIENT
Start: 2022-03-31 | End: 2022-03-31

## 2022-03-31 RX ORDER — ONDANSETRON 4 MG/1
4 TABLET, ORALLY DISINTEGRATING ORAL EVERY 6 HOURS PRN
Status: DISCONTINUED | OUTPATIENT
Start: 2022-03-31 | End: 2022-04-01 | Stop reason: HOSPADM

## 2022-03-31 RX ORDER — FENTANYL CITRATE 0.05 MG/ML
50 INJECTION, SOLUTION INTRAMUSCULAR; INTRAVENOUS EVERY 5 MIN PRN
Status: DISCONTINUED | OUTPATIENT
Start: 2022-03-31 | End: 2022-03-31 | Stop reason: HOSPADM

## 2022-03-31 RX ORDER — LIDOCAINE 40 MG/G
CREAM TOPICAL
Status: DISCONTINUED | OUTPATIENT
Start: 2022-03-31 | End: 2022-04-01 | Stop reason: HOSPADM

## 2022-03-31 RX ORDER — NALOXONE HYDROCHLORIDE 0.4 MG/ML
0.2 INJECTION, SOLUTION INTRAMUSCULAR; INTRAVENOUS; SUBCUTANEOUS
Status: DISCONTINUED | OUTPATIENT
Start: 2022-03-31 | End: 2022-04-01 | Stop reason: HOSPADM

## 2022-03-31 RX ORDER — POTASSIUM CITRATE 10 MEQ/1
20 TABLET, EXTENDED RELEASE ORAL 2 TIMES DAILY
COMMUNITY
End: 2022-11-11

## 2022-03-31 RX ORDER — NALOXONE HYDROCHLORIDE 0.4 MG/ML
0.4 INJECTION, SOLUTION INTRAMUSCULAR; INTRAVENOUS; SUBCUTANEOUS
Status: DISCONTINUED | OUTPATIENT
Start: 2022-03-31 | End: 2022-04-01 | Stop reason: HOSPADM

## 2022-03-31 RX ORDER — FENTANYL CITRATE 50 UG/ML
INJECTION, SOLUTION INTRAMUSCULAR; INTRAVENOUS PRN
Status: DISCONTINUED | OUTPATIENT
Start: 2022-03-31 | End: 2022-03-31

## 2022-03-31 RX ORDER — PROPOFOL 10 MG/ML
INJECTION, EMULSION INTRAVENOUS PRN
Status: DISCONTINUED | OUTPATIENT
Start: 2022-03-31 | End: 2022-03-31

## 2022-03-31 RX ORDER — SODIUM CHLORIDE, SODIUM LACTATE, POTASSIUM CHLORIDE, CALCIUM CHLORIDE 600; 310; 30; 20 MG/100ML; MG/100ML; MG/100ML; MG/100ML
INJECTION, SOLUTION INTRAVENOUS CONTINUOUS
Status: DISCONTINUED | OUTPATIENT
Start: 2022-03-31 | End: 2022-03-31 | Stop reason: HOSPADM

## 2022-03-31 RX ORDER — HEPARIN SODIUM 5000 [USP'U]/.5ML
5000 INJECTION, SOLUTION INTRAVENOUS; SUBCUTANEOUS EVERY 8 HOURS
Status: DISCONTINUED | OUTPATIENT
Start: 2023-03-01 | End: 2022-03-31

## 2022-03-31 RX ORDER — ONDANSETRON 4 MG/1
4 TABLET, ORALLY DISINTEGRATING ORAL EVERY 30 MIN PRN
Status: DISCONTINUED | OUTPATIENT
Start: 2022-03-31 | End: 2022-03-31 | Stop reason: HOSPADM

## 2022-03-31 RX ORDER — ONDANSETRON 2 MG/ML
4 INJECTION INTRAMUSCULAR; INTRAVENOUS EVERY 30 MIN PRN
Status: DISCONTINUED | OUTPATIENT
Start: 2022-03-31 | End: 2022-03-31 | Stop reason: HOSPADM

## 2022-03-31 RX ORDER — LISINOPRIL 10 MG/1
10 TABLET ORAL DAILY
Status: DISCONTINUED | OUTPATIENT
Start: 2022-03-31 | End: 2022-04-01 | Stop reason: HOSPADM

## 2022-03-31 RX ORDER — HYDROMORPHONE HCL IN WATER/PF 6 MG/30 ML
0.4 PATIENT CONTROLLED ANALGESIA SYRINGE INTRAVENOUS
Status: DISCONTINUED | OUTPATIENT
Start: 2022-03-31 | End: 2022-04-01 | Stop reason: HOSPADM

## 2022-03-31 RX ORDER — ACETAMINOPHEN 325 MG/1
650 TABLET ORAL EVERY 6 HOURS PRN
Status: DISCONTINUED | OUTPATIENT
Start: 2022-03-31 | End: 2022-04-01 | Stop reason: HOSPADM

## 2022-03-31 RX ORDER — NEOSTIGMINE METHYLSULFATE 1 MG/ML
VIAL (ML) INJECTION PRN
Status: DISCONTINUED | OUTPATIENT
Start: 2022-03-31 | End: 2022-03-31

## 2022-03-31 RX ORDER — ONDANSETRON 2 MG/ML
INJECTION INTRAMUSCULAR; INTRAVENOUS PRN
Status: DISCONTINUED | OUTPATIENT
Start: 2022-03-31 | End: 2022-03-31

## 2022-03-31 RX ORDER — OXYCODONE HYDROCHLORIDE 5 MG/1
5 TABLET ORAL EVERY 4 HOURS PRN
Status: DISCONTINUED | OUTPATIENT
Start: 2022-03-31 | End: 2022-03-31

## 2022-03-31 RX ORDER — POLYETHYLENE GLYCOL 3350 17 G/17G
17 POWDER, FOR SOLUTION ORAL DAILY
Status: DISCONTINUED | OUTPATIENT
Start: 2022-03-31 | End: 2022-04-01 | Stop reason: HOSPADM

## 2022-03-31 RX ORDER — LIDOCAINE HYDROCHLORIDE 20 MG/ML
INJECTION, SOLUTION INFILTRATION; PERINEURAL PRN
Status: DISCONTINUED | OUTPATIENT
Start: 2022-03-31 | End: 2022-03-31

## 2022-03-31 RX ORDER — POTASSIUM CITRATE 5 MEQ/1
20 TABLET, EXTENDED RELEASE ORAL 2 TIMES DAILY
Status: DISCONTINUED | OUTPATIENT
Start: 2022-03-31 | End: 2022-04-01 | Stop reason: HOSPADM

## 2022-03-31 RX ORDER — SENNOSIDES 8.6 MG
8.6 TABLET ORAL 2 TIMES DAILY PRN
Status: DISCONTINUED | OUTPATIENT
Start: 2022-03-31 | End: 2022-04-01 | Stop reason: HOSPADM

## 2022-03-31 RX ORDER — ONDANSETRON 2 MG/ML
4 INJECTION INTRAMUSCULAR; INTRAVENOUS EVERY 6 HOURS PRN
Status: DISCONTINUED | OUTPATIENT
Start: 2022-03-31 | End: 2022-04-01 | Stop reason: HOSPADM

## 2022-03-31 RX ORDER — SODIUM CHLORIDE 9 MG/ML
INJECTION, SOLUTION INTRAVENOUS CONTINUOUS
Status: DISCONTINUED | OUTPATIENT
Start: 2022-03-31 | End: 2022-04-01 | Stop reason: HOSPADM

## 2022-03-31 RX ORDER — HYDROMORPHONE HCL IN WATER/PF 6 MG/30 ML
0.4 PATIENT CONTROLLED ANALGESIA SYRINGE INTRAVENOUS EVERY 5 MIN PRN
Status: DISCONTINUED | OUTPATIENT
Start: 2022-03-31 | End: 2022-03-31 | Stop reason: HOSPADM

## 2022-03-31 RX ORDER — DEXAMETHASONE SODIUM PHOSPHATE 4 MG/ML
INJECTION, SOLUTION INTRA-ARTICULAR; INTRALESIONAL; INTRAMUSCULAR; INTRAVENOUS; SOFT TISSUE PRN
Status: DISCONTINUED | OUTPATIENT
Start: 2022-03-31 | End: 2022-03-31

## 2022-03-31 RX ORDER — CEFAZOLIN SODIUM/WATER 2 G/20 ML
2 SYRINGE (ML) INTRAVENOUS SEE ADMIN INSTRUCTIONS
Status: DISCONTINUED | OUTPATIENT
Start: 2022-03-31 | End: 2022-03-31 | Stop reason: HOSPADM

## 2022-03-31 RX ORDER — ACETAMINOPHEN 500 MG
500 TABLET ORAL EVERY 6 HOURS PRN
COMMUNITY
End: 2022-11-03

## 2022-03-31 RX ORDER — HEPARIN SODIUM 5000 [USP'U]/.5ML
5000 INJECTION, SOLUTION INTRAVENOUS; SUBCUTANEOUS EVERY 8 HOURS
Status: DISCONTINUED | OUTPATIENT
Start: 2022-04-01 | End: 2022-04-01 | Stop reason: HOSPADM

## 2022-03-31 RX ORDER — OXYCODONE HYDROCHLORIDE 5 MG/1
5 TABLET ORAL EVERY 4 HOURS PRN
Status: DISCONTINUED | OUTPATIENT
Start: 2022-03-31 | End: 2022-04-01 | Stop reason: HOSPADM

## 2022-03-31 RX ORDER — HYDROMORPHONE HCL IN WATER/PF 6 MG/30 ML
0.2 PATIENT CONTROLLED ANALGESIA SYRINGE INTRAVENOUS
Status: DISCONTINUED | OUTPATIENT
Start: 2022-03-31 | End: 2022-04-01 | Stop reason: HOSPADM

## 2022-03-31 RX ORDER — PROPOFOL 10 MG/ML
INJECTION, EMULSION INTRAVENOUS CONTINUOUS PRN
Status: DISCONTINUED | OUTPATIENT
Start: 2022-03-31 | End: 2022-03-31

## 2022-03-31 RX ORDER — CEFAZOLIN SODIUM/WATER 2 G/20 ML
2 SYRINGE (ML) INTRAVENOUS
Status: COMPLETED | OUTPATIENT
Start: 2022-03-31 | End: 2022-03-31

## 2022-03-31 RX ORDER — MAGNESIUM HYDROXIDE 1200 MG/15ML
LIQUID ORAL PRN
Status: DISCONTINUED | OUTPATIENT
Start: 2022-03-31 | End: 2022-03-31 | Stop reason: HOSPADM

## 2022-03-31 RX ORDER — OXYCODONE HYDROCHLORIDE 5 MG/1
10 TABLET ORAL EVERY 4 HOURS PRN
Status: DISCONTINUED | OUTPATIENT
Start: 2022-03-31 | End: 2022-04-01 | Stop reason: HOSPADM

## 2022-03-31 RX ORDER — SODIUM CHLORIDE, SODIUM LACTATE, POTASSIUM CHLORIDE, CALCIUM CHLORIDE 600; 310; 30; 20 MG/100ML; MG/100ML; MG/100ML; MG/100ML
INJECTION, SOLUTION INTRAVENOUS CONTINUOUS PRN
Status: DISCONTINUED | OUTPATIENT
Start: 2022-03-31 | End: 2022-03-31

## 2022-03-31 RX ADMIN — FENTANYL CITRATE 50 MCG: 50 INJECTION INTRAMUSCULAR; INTRAVENOUS at 16:48

## 2022-03-31 RX ADMIN — DEXAMETHASONE SODIUM PHOSPHATE 4 MG: 4 INJECTION, SOLUTION INTRA-ARTICULAR; INTRALESIONAL; INTRAMUSCULAR; INTRAVENOUS; SOFT TISSUE at 12:35

## 2022-03-31 RX ADMIN — SODIUM CHLORIDE, POTASSIUM CHLORIDE, SODIUM LACTATE AND CALCIUM CHLORIDE: 600; 310; 30; 20 INJECTION, SOLUTION INTRAVENOUS at 11:27

## 2022-03-31 RX ADMIN — LIDOCAINE HYDROCHLORIDE 100 MG: 20 INJECTION, SOLUTION INFILTRATION; PERINEURAL at 12:21

## 2022-03-31 RX ADMIN — HYDROMORPHONE HYDROCHLORIDE 0.5 MG: 1 INJECTION, SOLUTION INTRAMUSCULAR; INTRAVENOUS; SUBCUTANEOUS at 12:53

## 2022-03-31 RX ADMIN — FENTANYL CITRATE 50 MCG: 50 INJECTION INTRAMUSCULAR; INTRAVENOUS at 16:52

## 2022-03-31 RX ADMIN — PROPOFOL 200 MG: 10 INJECTION, EMULSION INTRAVENOUS at 12:21

## 2022-03-31 RX ADMIN — LISINOPRIL 10 MG: 10 TABLET ORAL at 19:12

## 2022-03-31 RX ADMIN — ROCURONIUM BROMIDE 10 MG: 50 INJECTION, SOLUTION INTRAVENOUS at 13:35

## 2022-03-31 RX ADMIN — ROCURONIUM BROMIDE 50 MG: 50 INJECTION, SOLUTION INTRAVENOUS at 12:21

## 2022-03-31 RX ADMIN — HYDROMORPHONE HYDROCHLORIDE 0.4 MG: 0.2 INJECTION, SOLUTION INTRAMUSCULAR; INTRAVENOUS; SUBCUTANEOUS at 18:01

## 2022-03-31 RX ADMIN — HYDROMORPHONE HYDROCHLORIDE 0.4 MG: 0.2 INJECTION, SOLUTION INTRAMUSCULAR; INTRAVENOUS; SUBCUTANEOUS at 17:11

## 2022-03-31 RX ADMIN — ROCURONIUM BROMIDE 10 MG: 50 INJECTION, SOLUTION INTRAVENOUS at 13:00

## 2022-03-31 RX ADMIN — GLYCOPYRROLATE 0.8 MG: 0.2 INJECTION, SOLUTION INTRAMUSCULAR; INTRAVENOUS at 16:04

## 2022-03-31 RX ADMIN — HYDROMORPHONE HYDROCHLORIDE 0.4 MG: 0.2 INJECTION, SOLUTION INTRAMUSCULAR; INTRAVENOUS; SUBCUTANEOUS at 17:16

## 2022-03-31 RX ADMIN — HYDROMORPHONE HYDROCHLORIDE 0.4 MG: 0.2 INJECTION, SOLUTION INTRAMUSCULAR; INTRAVENOUS; SUBCUTANEOUS at 21:53

## 2022-03-31 RX ADMIN — HYDROMORPHONE HYDROCHLORIDE 0.4 MG: 0.2 INJECTION, SOLUTION INTRAMUSCULAR; INTRAVENOUS; SUBCUTANEOUS at 17:23

## 2022-03-31 RX ADMIN — ONDANSETRON 4 MG: 2 INJECTION INTRAMUSCULAR; INTRAVENOUS at 15:57

## 2022-03-31 RX ADMIN — ROCURONIUM BROMIDE 20 MG: 50 INJECTION, SOLUTION INTRAVENOUS at 12:46

## 2022-03-31 RX ADMIN — ROCURONIUM BROMIDE 10 MG: 50 INJECTION, SOLUTION INTRAVENOUS at 15:15

## 2022-03-31 RX ADMIN — PHENYLEPHRINE HYDROCHLORIDE 100 MCG: 10 INJECTION INTRAVENOUS at 16:02

## 2022-03-31 RX ADMIN — Medication 2 G: at 16:16

## 2022-03-31 RX ADMIN — ROCURONIUM BROMIDE 20 MG: 50 INJECTION, SOLUTION INTRAVENOUS at 14:15

## 2022-03-31 RX ADMIN — OXYCODONE HYDROCHLORIDE 5 MG: 5 TABLET ORAL at 23:48

## 2022-03-31 RX ADMIN — MIDAZOLAM 2 MG: 1 INJECTION INTRAMUSCULAR; INTRAVENOUS at 12:16

## 2022-03-31 RX ADMIN — SODIUM CHLORIDE, POTASSIUM CHLORIDE, SODIUM LACTATE AND CALCIUM CHLORIDE: 600; 310; 30; 20 INJECTION, SOLUTION INTRAVENOUS at 14:27

## 2022-03-31 RX ADMIN — ROCURONIUM BROMIDE 20 MG: 50 INJECTION, SOLUTION INTRAVENOUS at 13:48

## 2022-03-31 RX ADMIN — POLYETHYLENE GLYCOL 3350 17 G: 17 POWDER, FOR SOLUTION ORAL at 19:12

## 2022-03-31 RX ADMIN — PHENYLEPHRINE HYDROCHLORIDE 50 MCG: 10 INJECTION INTRAVENOUS at 15:24

## 2022-03-31 RX ADMIN — NEOSTIGMINE METHYLSULFATE 5 MG: 1 INJECTION, SOLUTION INTRAVENOUS at 16:04

## 2022-03-31 RX ADMIN — FENTANYL CITRATE 50 MCG: 50 INJECTION, SOLUTION INTRAMUSCULAR; INTRAVENOUS at 12:46

## 2022-03-31 RX ADMIN — FENTANYL CITRATE 50 MCG: 50 INJECTION, SOLUTION INTRAMUSCULAR; INTRAVENOUS at 12:21

## 2022-03-31 RX ADMIN — ROCURONIUM BROMIDE 10 MG: 50 INJECTION, SOLUTION INTRAVENOUS at 13:15

## 2022-03-31 RX ADMIN — ROCURONIUM BROMIDE 10 MG: 50 INJECTION, SOLUTION INTRAVENOUS at 15:45

## 2022-03-31 RX ADMIN — PROPOFOL 30 MCG/KG/MIN: 10 INJECTION, EMULSION INTRAVENOUS at 12:25

## 2022-03-31 RX ADMIN — Medication 2 G: at 12:16

## 2022-03-31 RX ADMIN — SODIUM CHLORIDE: 9 INJECTION, SOLUTION INTRAVENOUS at 19:13

## 2022-03-31 RX ADMIN — SODIUM CHLORIDE, POTASSIUM CHLORIDE, SODIUM LACTATE AND CALCIUM CHLORIDE: 600; 310; 30; 20 INJECTION, SOLUTION INTRAVENOUS at 12:33

## 2022-03-31 RX ADMIN — DEXMEDETOMIDINE HYDROCHLORIDE 12 MCG: 200 INJECTION INTRAVENOUS at 13:11

## 2022-03-31 RX ADMIN — ACETAMINOPHEN 650 MG: 325 TABLET ORAL at 23:48

## 2022-03-31 ASSESSMENT — ACTIVITIES OF DAILY LIVING (ADL)
ADLS_ACUITY_SCORE: 3
ADLS_ACUITY_SCORE: 12
ADLS_ACUITY_SCORE: 3

## 2022-03-31 ASSESSMENT — LIFESTYLE VARIABLES: TOBACCO_USE: 0

## 2022-03-31 NOTE — INTERVAL H&P NOTE
"I have reviewed the surgical (or preoperative) H&P that is linked to this encounter, and examined the patient. There are no significant changes    Clinical Conditions Present on Arrival:  Clinically Significant Risk Factors Present on Admission                    # Obesity: Estimated body mass index is 31.33 kg/m  as calculated from the following:    Height as of this encounter: 1.803 m (5' 11\").    Weight as of this encounter: 101.9 kg (224 lb 9.6 oz).       "

## 2022-03-31 NOTE — ANESTHESIA PREPROCEDURE EVALUATION
Anesthesia Pre-Procedure Evaluation    Patient: Magdaleno Marie   MRN: [id number] : 1955        Procedure : Procedure(s):  ROBOTIC ASSISTED LAPAROSCOPIC RADICAL PROSTATECTOMY BILATERAL PELVIC LYMPHADENECTOMY POSSIBLE OPEN          Past Medical History:   Diagnosis Date     Calculus of kidney     kidney stones     Hypercholesterolemia      Hypertension      Prostate cancer (H)       Past Surgical History:   Procedure Laterality Date     COLONOSCOPY       COMBINED CYSTOSCOPY, RETROGRADES, URETEROSCOPY, LASER HOLMIUM LITHOTRIPSY URETER(S), INSERT STENT Left 2016    Procedure: COMBINED CYSTOSCOPY, RETROGRADES, URETEROSCOPY, LASER HOLMIUM LITHOTRIPSY URETER(S), INSERT STENT;  Surgeon: Arnel Andrade MD;  Location:  OR     COMBINED CYSTOSCOPY, RETROGRADES, URETEROSCOPY, LASER HOLMIUM LITHOTRIPSY URETER(S), INSERT STENT Bilateral 2019    Procedure: Cystoscopy, bilateral retrograde pyelograms, interpretation of fluoroscopic images, bilateral ureteroscopy with holmium laser lithotripsy and stone basketing, placement of 5 x 26 double-J ureteral stents bilaterally;  Surgeon: Arnel Andrade MD;  Location:  OR     CYSTOSCOPY       GENITOURINARY SURGERY      shock wave lithotripsy     LASER HOLMIUM LITHOTRIPSY URETER(S), INSERT STENT, COMBINED Right 2022    Procedure: Cystoscopy, right ureteroscopy with holmium laser lithotripsy, right ureteral stent placement,;  Surgeon: Arnel Andrade MD;  Location:  OR     ORTHOPEDIC SURGERY      Torn meniscus       Allergies   Allergen Reactions     No Known Drug Allergies       Social History     Tobacco Use     Smoking status: Never Smoker     Smokeless tobacco: Never Used   Substance Use Topics     Alcohol use: Yes     Comment: 12-14 drinks per  week      Wt Readings from Last 1 Encounters:   22 101.9 kg (224 lb 9.6 oz)        Anesthesia Evaluation   Pt has had prior anesthetic.     No history of anesthetic  complications       ROS/MED HX  ENT/Pulmonary:    (-) tobacco use, asthma and sleep apnea   Neurologic:       Cardiovascular:     (+) Dyslipidemia hypertension-----    METS/Exercise Tolerance:     Hematologic:       Musculoskeletal:       GI/Hepatic:    (-) GERD   Renal/Genitourinary:     (+) renal disease,     Endo:       Psychiatric/Substance Use:       Infectious Disease:       Malignancy:   (+) Malignancy, History of Prostate.    Other:            Physical Exam    Airway        Mallampati: II   TM distance: > 3 FB   Neck ROM: full   Mouth opening: > 3 cm    Respiratory Devices and Support         Dental  no notable dental history         Cardiovascular   cardiovascular exam normal          Pulmonary   pulmonary exam normal                OUTSIDE LABS:  CBC:   Lab Results   Component Value Date    WBC 8.6 05/28/2016    HGB 16.3 05/28/2016    HCT 46.3 05/28/2016     (L) 05/28/2016     BMP:   Lab Results   Component Value Date     01/21/2022     05/28/2016    POTASSIUM 3.9 03/31/2022    POTASSIUM 4.1 01/21/2022    CHLORIDE 104 01/21/2022    CHLORIDE 104 05/28/2016    CO2 26 01/21/2022    CO2 25 05/28/2016    BUN 24 01/21/2022    BUN 22 05/28/2016    CR 1.23 03/31/2022    CR 1.00 01/21/2022     (H) 01/21/2022    GLC 99 05/28/2016     COAGS: No results found for: PTT, INR, FIBR  POC: No results found for: BGM, HCG, HCGS  HEPATIC: No results found for: ALBUMIN, PROTTOTAL, ALT, AST, GGT, ALKPHOS, BILITOTAL, BILIDIRECT, ALON  OTHER:   Lab Results   Component Value Date    CHRISTOPH 9.0 01/21/2022       Anesthesia Plan    ASA Status:  2      Anesthesia Type: General.   Induction: Intravenous.   Maintenance: Balanced.   Techniques and Equipment:     - Airway: Video-Laryngoscope     - Lines/Monitors: 2nd IV     Consents    Anesthesia Plan(s) and associated risks, benefits, and realistic alternatives discussed. Questions answered and patient/representative(s) expressed understanding.    - Discussed:      - Discussed with:  Patient         Postoperative Care    Pain management: IV analgesics, Oral pain medications.   PONV prophylaxis: Ondansetron (or other 5HT-3), Background Propofol Infusion     Comments:    Other Comments: Type and screen    2 iuv's            Elzbieta Barry

## 2022-03-31 NOTE — BRIEF OP NOTE
[facility]    Brief Operative Note    Pre-operative diagnosis: Prostate cancer (H) [C61]  Post-operative diagnosis Same as pre-operative diagnosis    Procedure: Procedure(s):  ROBOTIC ASSISTED LAPAROSCOPIC RADICAL PROSTATECTOMY,  BILATERAL PELVIC LYMPHADENECTOMY  Surgeon: Surgeon(s) and Role:     * Arnel Andrade MD - Primary  Anesthesia: General   Estimated Blood Loss: 200 mL from 3/31/2022 12:14 PM to 3/31/2022  4:27 PM      Drains: Roberto-Patel  Specimens:   ID Type Source Tests Collected by Time Destination   1 : PROSTATE AND BILATERAL PELVIC LYMPH NODES Tissue Prostate SURGICAL PATHOLOGY EXAM Arnel Andrade MD 3/31/2022  4:03 PM      Findings:   bl nerve sparing. Watertight anastomosis. .  Complications: None.  Implants: * No implants in log *

## 2022-03-31 NOTE — OP NOTE
Procedure Date: 03/31/2022    ATTENDING SURGEON:  Arnel Andrade MD    :  Waleska Ball MD    PREOPERATIVE DIAGNOSIS:  Prostate cancer.    POSTOPERATIVE DIAGNOSIS:  Prostate cancer.    PROCEDURE PERFORMED:  Robotic-assisted laparoscopic radical prostatectomy with bilateral pelvic lymph node dissection, bilateral nerve sparing procedure.    ANESTHESIA:  General.    COMPLICATIONS:  None.    INDICATIONS:  Magdaleno Marie is a 66-year-old gentleman with prostate cancer, who now presents for robotic prostatectomy.      DETAILS OF PROCEDURE:  Risks and benefits of the procedure were explained in detail to the patient and informed consent was obtained.  The patient was brought to the operating room and placed supine on the table and underwent general endotracheal anesthetic.  He was then secured to the table and the abdomen was shaved and prepped and draped in sterile fashion.  Procedure began by placing a De Leon catheter to drain the bladder.  I tented up the umbilicus and placed a Veress needle at the umbilicus.  CO2 was instilled until 50 mmHg pressure was achieved and then incision was made above the umbilicus and a robotic trocar was placed through this site.  We inspected the abdomen and no injury had occurred.  Under direct visualization, 2 right-sided robotic arm ports were placed and 1 on the left side as well as an AirSeal assistant port off to the left side of the camera port.  The patient was then brought to the 25-degree Trendelenburg position and the robot was docked.    The sigmoid colon was tented up to the end of the procedure and we incised anteriorly into the peritoneal reflection to identify vas deferens and seminal vesicles bilaterally.  The 4 structures were cleared of surrounding tissues and vas deferens was cut on each side.  We then released retraction on the sigmoid colon and I incised through the medial umbilical ligament on each side to bring down the bladder and develop the  space of Retzius.  This was performed until the pubic arch and endopelvic fascia was identified.  The superficial dorsal vein of the prostate was taken down using electrocautery.  Then, excess fat was removed from overlying the endopelvic fascia.  I incised endopelvic fascia laterally on each side to identify the lateral limits of the prostate.  A 2-0 V-Loc suture was then placed twice on the dorsal venous complex for hemostasis purposes and the needle was cut and removed from the body.  I then pulled on the De Leon catheter to identify the prostatovesical junction and incised anteriorly into the bladder neck to dissect the anterior bladder neck free from the anterior base of the prostate.  Dissection was carried through until I reached the De Leon catheter.  The De Leon catheter balloon was let down and was brought to the plane of dissection for retraction purposes.  I then dissected the posterior bladder neck free from the posterior base of the prostate and this dissection was carried through until I reached the seminal vessels and vas deferens.  The 4 structures were tented up and I incised Denonvilliers fascia and then developed the posterior plane of the prostate and bluntly elevated the apex of the prostate.    I next performed a nerve-sparing procedure on each side by incising the lateral prostatic fascia and dissecting the neurovascular bundle away from the lateral aspect of the prostate on each side.  The pedicles of the prostate was then taken down with a series of Weck clips on each side.  I then finished my nerve-sparing procedure from below by bluntly dissecting the neurovascular bundle off the posterolateral aspect of the prostate all the way to the apex of the prostate on each side.  I then looked back anteriorly and incised the dorsal venous complex with minimal bleeding.  I cut the urethra sharply to free the specimen.  The specimen was placed in an EndoCatch specimen bag through the assistant port.     I  next performed a bilateral pelvic lymph node dissection by dissecting free the lymph node packet to the external iliac vein and obturator nerve roots side.  This was taken down with a series of Weck clips on each side.  At the end and, I performed a vesicourethral reanastomosis by running a double-armed 3-0 V-Loc suture beginning at 6 o'clock and ending at the 12 o'clock position on each side.  The needle was cut and removed from the body.  A new De Leon catheter was placed and the bladder was irrigated thoroughly.  The left-sided instrument was removed and a Roberto-Patel drain was placed through this site.  I removed the trocar from the site and tacked the Roberto-Patel to the skin level with 2-0 silk suture.  The robot was then undocked and all trocars were removed.  The patient was brought out of Trendelenburg position.  I increased the size of the supraumbilical incision such that I could bring out the EndoCatch specimen through this site intact.  The fascia was reclosed at this site with interrupted #1 PDS figure-of-eight sutures.  Skin incisions were all closed with 4-0 Monocryl subcuticular stitches and covered with Dermabond.  The procedure was concluded at this point.    The patient tolerated the procedure well without complications.  He went to post-anesthetic care unit in good condition.  He will go to the hospital for overnight monitoring from there.    Arnel Andrade MD        D: 2022   T: 2022   MT: BASILIA/DCQA    Name:     DANNIE JONESOralia  MRN:      [id number]        Account:        [id number]   :      1955           Procedure Date: 2022     Document: [id number]

## 2022-03-31 NOTE — ANESTHESIA CARE TRANSFER NOTE
Patient: Magdaleno Marie    Procedure: Procedure(s):  ROBOTIC ASSISTED LAPAROSCOPIC RADICAL PROSTATECTOMY,  BILATERAL PELVIC LYMPHADENECTOMY       Diagnosis: Prostate cancer (H) [C61]  Diagnosis Additional Information: No value filed.    Anesthesia Type:   General     Note:    Oropharynx: oropharynx clear of all foreign objects and spontaneously breathing  Level of Consciousness: drowsy  Oxygen Supplementation: face mask  Level of Supplemental Oxygen (L/min / FiO2): 8  Independent Airway: airway patency satisfactory and stable  Dentition: dentition unchanged  Vital Signs Stable: post-procedure vital signs reviewed and stable  Report to RN Given: handoff report given  Patient transferred to: PACU  Comments: Neuromuscular blockade reversed after TOF 4/4, spontaneous respirations, adequate tidal volumes, followed commands to voice, extubated atraumatically, extubated with suction, airway patent after extubation.  Oxygen via facemask at 8 liters per minute to PACU. Oxygen tubing connected to wall O2 in PACU, SpO2, NiBP, and EKG monitors and alarms on and functioning, report on patient's clinical status given to PACU RN, RN questions answered  .     Handoff Report: Identifed the Patient, Identified the Reponsible Provider, Reviewed the pertinent medical history, Discussed the surgical course, Reviewed Intra-OP anesthesia mangement and issues during anesthesia, Set expectations for post-procedure period and Allowed opportunity for questions and acknowledgement of understanding      Vitals:  Vitals Value Taken Time   /81 03/31/22 1630   Temp 36.1  C (97  F) 03/31/22 1630   Pulse 79 03/31/22 1630   Resp 29 03/31/22 1633   SpO2 99 % 03/31/22 1633   Vitals shown include unvalidated device data.    Electronically Signed By: ДМИТРИЙ Javier CRNA  March 31, 2022  4:34 PM

## 2022-03-31 NOTE — ANESTHESIA PROCEDURE NOTES
Airway       Patient location during procedure: OR       Procedure Start/Stop Times: 3/31/2022 12:24 PM  Staff -        Anesthesiologist:  Elzbieta Barry       CRNA: Kristie Barajas APRN CRNA       Performed By: CRNAIndications and Patient Condition       Indications for airway management: delma-procedural       Induction type:intravenous       Mask difficulty assessment: 1 - vent by mask    Final Airway Details       Final airway type: endotracheal airway       Successful airway: ETT - single  Endotracheal Airway Details        ETT size (mm): 8.0       Cuffed: yes       Successful intubation technique: video laryngoscopy       VL Blade Size: Glidescope 4       Grade View of Cords: 1       Adjucts: stylet       Position: Right       Measured from: gums/teeth       Secured at (cm): 23       Bite block used: None    Post intubation assessment        Placement verified by: capnometry, equal breath sounds and chest rise        Number of attempts at approach: 1       Secured with: pink tape       Ease of procedure: easy       Dentition: Intact and Unchanged

## 2022-03-31 NOTE — PROGRESS NOTES
PTA medications completed by Medication Scribe day of surgery     Medication history sources: Patient, Surescripts and H&P  In the past week, patient estimated taking medication this percent of the time: Greater than 90%  Adherence assessment: N/A Not Observed    Significant changes made to the medication list:  Patient reports no longer taking the following meds (med scribe removed from PTA med list): Tamsulosin      Additional medication history information:   None    Medication reconciliation completed by provider prior to medication history? No    Time spent in this activity: 35 minutes    The information provided in this note is only as accurate as the sources available at the time of update(s)    Prior to Admission medications    Medication Sig Last Dose Taking? Auth Provider   acetaminophen (TYLENOL) 500 MG tablet Take 500 mg by mouth every 6 hours as needed for mild pain 3/30/2022 at pm Yes Reported, Patient   lisinopril (PRINIVIL/ZESTRIL) 10 MG tablet Take 10 mg by mouth daily 3/30/2022 at am Yes Reported, Patient   potassium citrate (UROCIT-K) 10 MEQ (1080 MG) CR tablet Take 20 mEq by mouth 2 times daily (2 x 10 meq = 20 meq) 3/30/2022 at pm Yes Reported, Patient     Medication history completed by:    Kishore Lopez CPhT  Medication Scribe  [facility]

## 2022-04-01 VITALS
DIASTOLIC BLOOD PRESSURE: 55 MMHG | WEIGHT: 224.6 LBS | HEART RATE: 58 BPM | HEIGHT: 71 IN | RESPIRATION RATE: 16 BRPM | TEMPERATURE: 96.6 F | OXYGEN SATURATION: 94 % | SYSTOLIC BLOOD PRESSURE: 111 MMHG | BODY MASS INDEX: 31.44 KG/M2

## 2022-04-01 LAB
ANION GAP SERPL CALCULATED.3IONS-SCNC: 4 MMOL/L (ref 3–14)
BUN SERPL-MCNC: 22 MG/DL (ref 7–30)
CALCIUM SERPL-MCNC: 8.5 MG/DL (ref 8.5–10.1)
CHLORIDE BLD-SCNC: 106 MMOL/L (ref 94–109)
CO2 SERPL-SCNC: 26 MMOL/L (ref 20–32)
CREAT SERPL-MCNC: 1.12 MG/DL (ref 0.66–1.25)
ERYTHROCYTE [DISTWIDTH] IN BLOOD BY AUTOMATED COUNT: 13 % (ref 10–15)
GFR SERPL CREATININE-BSD FRML MDRD: 72 ML/MIN/1.73M2
GLUCOSE BLD-MCNC: 108 MG/DL (ref 70–99)
GLUCOSE BLDC GLUCOMTR-MCNC: 108 MG/DL (ref 70–99)
HCT VFR BLD AUTO: 42.3 % (ref 40–53)
HGB BLD-MCNC: 14.2 G/DL (ref 13.3–17.7)
MCH RBC QN AUTO: 31 PG (ref 26.5–33)
MCHC RBC AUTO-ENTMCNC: 33.6 G/DL (ref 31.5–36.5)
MCV RBC AUTO: 92 FL (ref 78–100)
PLATELET # BLD AUTO: 167 10E3/UL (ref 150–450)
POTASSIUM BLD-SCNC: 4.4 MMOL/L (ref 3.4–5.3)
RBC # BLD AUTO: 4.58 10E6/UL (ref 4.4–5.9)
SODIUM SERPL-SCNC: 136 MMOL/L (ref 133–144)
WBC # BLD AUTO: 13.5 10E3/UL (ref 4–11)

## 2022-04-01 PROCEDURE — 82962 GLUCOSE BLOOD TEST: CPT

## 2022-04-01 PROCEDURE — 36415 COLL VENOUS BLD VENIPUNCTURE: CPT | Performed by: STUDENT IN AN ORGANIZED HEALTH CARE EDUCATION/TRAINING PROGRAM

## 2022-04-01 PROCEDURE — 85027 COMPLETE CBC AUTOMATED: CPT | Performed by: STUDENT IN AN ORGANIZED HEALTH CARE EDUCATION/TRAINING PROGRAM

## 2022-04-01 PROCEDURE — 80048 BASIC METABOLIC PNL TOTAL CA: CPT | Performed by: STUDENT IN AN ORGANIZED HEALTH CARE EDUCATION/TRAINING PROGRAM

## 2022-04-01 PROCEDURE — 250N000013 HC RX MED GY IP 250 OP 250 PS 637: Performed by: STUDENT IN AN ORGANIZED HEALTH CARE EDUCATION/TRAINING PROGRAM

## 2022-04-01 PROCEDURE — 96372 THER/PROPH/DIAG INJ SC/IM: CPT | Performed by: UROLOGY

## 2022-04-01 PROCEDURE — 258N000003 HC RX IP 258 OP 636: Performed by: STUDENT IN AN ORGANIZED HEALTH CARE EDUCATION/TRAINING PROGRAM

## 2022-04-01 PROCEDURE — 250N000011 HC RX IP 250 OP 636: Performed by: UROLOGY

## 2022-04-01 RX ORDER — OXYCODONE HYDROCHLORIDE 5 MG/1
5 TABLET ORAL EVERY 6 HOURS PRN
Qty: 12 TABLET | Refills: 0 | Status: SHIPPED | OUTPATIENT
Start: 2022-04-01 | End: 2022-04-04

## 2022-04-01 RX ORDER — SENNA AND DOCUSATE SODIUM 50; 8.6 MG/1; MG/1
1 TABLET, FILM COATED ORAL AT BEDTIME
Qty: 30 TABLET | Refills: 0 | Status: SHIPPED | OUTPATIENT
Start: 2022-04-01 | End: 2022-11-03

## 2022-04-01 RX ORDER — CIPROFLOXACIN 500 MG/1
500 TABLET, FILM COATED ORAL 2 TIMES DAILY
Qty: 6 TABLET | Refills: 0 | Status: SHIPPED | OUTPATIENT
Start: 2022-04-01 | End: 2022-04-04

## 2022-04-01 RX ADMIN — SODIUM CHLORIDE: 9 INJECTION, SOLUTION INTRAVENOUS at 03:42

## 2022-04-01 RX ADMIN — POTASSIUM CITRATE 20 MEQ: 5 TABLET ORAL at 08:06

## 2022-04-01 RX ADMIN — ACETAMINOPHEN 650 MG: 325 TABLET ORAL at 05:51

## 2022-04-01 RX ADMIN — HEPARIN SODIUM 5000 UNITS: 5000 INJECTION, SOLUTION INTRAVENOUS; SUBCUTANEOUS at 13:28

## 2022-04-01 RX ADMIN — LISINOPRIL 10 MG: 10 TABLET ORAL at 08:04

## 2022-04-01 RX ADMIN — ACETAMINOPHEN 650 MG: 325 TABLET ORAL at 12:46

## 2022-04-01 RX ADMIN — HEPARIN SODIUM 5000 UNITS: 5000 INJECTION, SOLUTION INTRAVENOUS; SUBCUTANEOUS at 05:51

## 2022-04-01 RX ADMIN — OXYCODONE HYDROCHLORIDE 5 MG: 5 TABLET ORAL at 03:41

## 2022-04-01 RX ADMIN — OXYCODONE HYDROCHLORIDE 5 MG: 5 TABLET ORAL at 08:26

## 2022-04-01 RX ADMIN — POLYETHYLENE GLYCOL 3350 17 G: 17 POWDER, FOR SOLUTION ORAL at 08:04

## 2022-04-01 RX ADMIN — OXYCODONE HYDROCHLORIDE 10 MG: 5 TABLET ORAL at 12:44

## 2022-04-01 NOTE — ANESTHESIA POSTPROCEDURE EVALUATION
Patient: Magdaleno Marie    Procedure: Procedure(s):  ROBOTIC ASSISTED LAPAROSCOPIC RADICAL PROSTATECTOMY,  BILATERAL PELVIC LYMPHADENECTOMY       Anesthesia Type:  General    Note:  Disposition: Inpatient   Postop Pain Control: Uneventful            Sign Out: Well controlled pain   PONV: No   Neuro/Psych: Uneventful            Sign Out: Acceptable/Baseline neuro status   Airway/Respiratory: Uneventful            Sign Out: Acceptable/Baseline resp. status   CV/Hemodynamics: Uneventful            Sign Out: Acceptable CV status; No obvious hypovolemia; No obvious fluid overload   Other NRE: NONE   DID A NON-ROUTINE EVENT OCCUR? No           Last vitals:  Vitals Value Taken Time   /92 03/31/22 1820   Temp 36.6  C (97.9  F) 03/31/22 1800   Pulse 87 03/31/22 1821   Resp 25 03/31/22 1821   SpO2 98 % 03/31/22 1822   Vitals shown include unvalidated device data.    Electronically Signed By: Alli Wilson DO, DO  March 31, 2022  8:16 PM

## 2022-04-01 NOTE — PROGRESS NOTES
"Urology  Progress Note    - pain well controlled  - tolerating sips, has not tried solid food  - has not been OOB yet    Exam  /75 (BP Location: Right arm, Patient Position: Semi-Blake's)   Pulse 80   Temp 97.1  F (36.2  C) (Oral)   Resp 16   Ht 1.803 m (5' 11\")   Wt 101.9 kg (224 lb 9.6 oz)   SpO2 96%   BMI 31.33 kg/m    No acute distress  Unlabored breathing  Abdomen soft, nontender, nondistended. Incisions cdi closed with dermabond  JUAN serosanguinous  De Leon with clear yellow urine in tubing      JUAN 55    Labs  AM labs pending    Assessment/Plan  66 year old y/o male POD#1 s/p RALP.    Neuro: tylenol, dilaudid, oxycodone for pain control  CV: HDS  Pulm: incentive spirometry while awake  FEN/GI: advance to regular diet, MIVF @ 100/hr. DC MIVF when tolerating regular diet.   Endo: EVE  : De Leon in place. Monitor urine output. De Leon teaching for discharge. JUAN to be removed PTD.   Heme: Hgb stable  ID: afebrile, no leukocystosis. Completed delma-op abx.   Activity: up ad thaddeus  PPx: SCDs. SQH.  Dispo: home today.    Waleska Ball MD    "

## 2022-04-01 NOTE — PLAN OF CARE
Goal Outcome Evaluation:  [id number]:   POD 0 prostatectomy and bilat pelvic lyphadenectomy. A/ox4. VSS weaned to RA. Denies N/V. Reporting incisional pain managed with 5mg oxy x2, tylenol x2 . Horizontal midline incision. 3 lap Sites approximated with liquid bandaid, open to air. Belly distended, tender. BS hypoactive. Denies passing gas. No BM. Patent De Leon, good yellow output. De Leon education provided and reinforced through shift. Michael drain to bulb suction, red/bloody drainage 40cc out. Dressing noted to have shadowing, marked to michael site. Tolerating sips diet, regular diet ordered this am.  Ax1 gb - not oob this shift. PIV x2, NS running @ 100ml/hr. Urology following. Discharge pending clinical improvement, likely later today.

## 2022-04-01 NOTE — PROGRESS NOTES
"Date/Time: 03/31/2022 7024-5405  Summary: POD 0 prostatectomy and bilat pelvic lymphadenectomy  Cognitive Concerns/Orientation: A&Ox4  Behavior and Aggression Color: green  ABNL VS/O2: VSS on 0.5L  CMS: intact  Mobility: Ax1 GB- did not get out of bed this shift.   Pain Management: prn IV dilaudid given x1  Diet: \"sips\" paged Raymond asking what this meant and he said \"small sips of liquid\". I tried to get him to be more specific but he said small sips of any liquid and a new diet order will be placed in the am. Pt tolerating ice chips and water with no complaints of n/v  Bowel/Bladder: mendoza in place. Pink-edilma colored urine. No BM   ABNL Labs/BG: NA  Drain/Devices: Mendoza. Left PIV SL. Right PIV infusing NS 100mL/hr. JUAN suction with serosanguinous drainage.    Telemetry Rhythm: NA  Skin: abdominal midline incision. 3 lap site incisions. JUAN incision.   Tests/Procedures: NA  Discharge Date: pending improvement  Other Info:     "

## 2022-04-04 DIAGNOSIS — Z79.2 PROPHYLACTIC ANTIBIOTIC: Primary | ICD-10-CM

## 2022-04-04 RX ORDER — CIPROFLOXACIN 500 MG/1
500 TABLET, FILM COATED ORAL ONCE
Qty: 1 TABLET | Refills: 0 | Status: SHIPPED | OUTPATIENT
Start: 2022-04-04 | End: 2022-04-04

## 2022-04-05 LAB
PATH REPORT.COMMENTS IMP SPEC: NORMAL
PATH REPORT.COMMENTS IMP SPEC: NORMAL
PATH REPORT.FINAL DX SPEC: NORMAL
PATH REPORT.GROSS SPEC: NORMAL
PATH REPORT.MICROSCOPIC SPEC OTHER STN: NORMAL
PATH REPORT.RELEVANT HX SPEC: NORMAL
PATHOLOGY SYNOPTIC REPORT: NORMAL
PHOTO IMAGE: NORMAL

## 2022-04-05 PROCEDURE — 88309 TISSUE EXAM BY PATHOLOGIST: CPT | Mod: 26 | Performed by: PATHOLOGY

## 2022-04-11 ENCOUNTER — OFFICE VISIT (OUTPATIENT)
Dept: UROLOGY | Facility: CLINIC | Age: 67
End: 2022-04-11
Payer: COMMERCIAL

## 2022-04-11 VITALS
DIASTOLIC BLOOD PRESSURE: 70 MMHG | BODY MASS INDEX: 31.5 KG/M2 | SYSTOLIC BLOOD PRESSURE: 128 MMHG | HEIGHT: 71 IN | WEIGHT: 225 LBS

## 2022-04-11 DIAGNOSIS — C61 PROSTATE CANCER (H): Primary | ICD-10-CM

## 2022-04-11 PROCEDURE — 99024 POSTOP FOLLOW-UP VISIT: CPT | Performed by: PHYSICIAN ASSISTANT

## 2022-04-11 RX ORDER — SILDENAFIL 50 MG/1
50 TABLET, FILM COATED ORAL
Qty: 36 TABLET | Refills: 0 | Status: SHIPPED | OUTPATIENT
Start: 2022-04-11 | End: 2022-11-03

## 2022-04-11 ASSESSMENT — PAIN SCALES - GENERAL: PAINLEVEL: NO PAIN (0)

## 2022-04-11 NOTE — PATIENT INSTRUCTIONS
Please see one of the dedicated pelvic floor physical therapists ([facility] [phone]).    Please be aware that coverage of these services is subject to the terms and limitations of your health insurance plan.  Call member services at your health plan with any benefit or coverage questions.      Please bring the following to your appointment:    *Your personal calendar for scheduling future appointments  *Comfortable clothing  ____________________________________________   Viagra 50mg 3x a week to get blood flowing.

## 2022-04-11 NOTE — PROGRESS NOTES
Office Visit Note  [facility]  [phone]    UROLOGIC DIAGNOSES:   Kidney stones  Intermediate risk prostate cancer    CURRENT INTERVENTIONS:   S/P RALP    HISTORY:   Magdaleno returns to clinic today for De Leon removal after RALP 3/31/22.  He had 4 lesions that were seen on the MRI that were all biopsied and targeted using MRI fusion technology.  One of the lesions on the left side showed a [place] 3+4 equal 7 prostate cancer.  Another one of the lesions showed [place] 3+3 equal 6 prostate cancer in 2 of the lesions were negative for prostate cancer.  On his template biopsies, 3 of the biopsies were positive for prostate cancer on the left side and 2 were positive on the right side.  1 of these lesions on the left side also showed [place] 3+4 equal 7 prostate cancer.  He has no family history of prostate cancer.    He has a hx of kidney stones composed of uric acid mixed with calcium oxalate.  He has an appointment set up from the spring with the kidney stone prevention clinic.  Currently on Uro-Cit K.     Recovering well. No ED issues pre-op.    Surgical path 3/31/22:  Prostate with seminal vesicles, robotic assist laparoscopic radical prostatectomy:  -No lymph nodes received with specimen  -Adenocarcinoma, grade group 3 with 55% pattern 4 tumor, several foci of extraprostatic extension (right mid posterior slides A9 and A10; left posterior superior slides A22 and A26), left seminal vesicle involvement identified, focal perineural invasion identified, negative margins  -Lymphovascular space involvement present (slide A22)    PAST MEDICAL HISTORY:   Past Medical History:   Diagnosis Date     Calculus of kidney     kidney stones     Hypercholesterolemia      Hypertension      Prostate cancer (H)        PAST SURGICAL HISTORY:   Past Surgical History:   Procedure Laterality Date     COLONOSCOPY       COMBINED CYSTOSCOPY, RETROGRADES, URETEROSCOPY, LASER HOLMIUM LITHOTRIPSY URETER(S), INSERT STENT Left  7/28/2016    Procedure: COMBINED CYSTOSCOPY, RETROGRADES, URETEROSCOPY, LASER HOLMIUM LITHOTRIPSY URETER(S), INSERT STENT;  Surgeon: Arnel Andrade MD;  Location:  OR     COMBINED CYSTOSCOPY, RETROGRADES, URETEROSCOPY, LASER HOLMIUM LITHOTRIPSY URETER(S), INSERT STENT Bilateral 9/9/2019    Procedure: Cystoscopy, bilateral retrograde pyelograms, interpretation of fluoroscopic images, bilateral ureteroscopy with holmium laser lithotripsy and stone basketing, placement of 5 x 26 double-J ureteral stents bilaterally;  Surgeon: Arnel Andrade MD;  Location:  OR     CYSTOSCOPY       DAVINCI PROSTATECTOMY N/A 3/31/2022    Procedure: ROBOTIC ASSISTED LAPAROSCOPIC RADICAL PROSTATECTOMY,  BILATERAL PELVIC LYMPHADENECTOMY;  Surgeon: Arnel Andrade MD;  Location:  OR     GENITOURINARY SURGERY  1980's    shock wave lithotripsy     LASER HOLMIUM LITHOTRIPSY URETER(S), INSERT STENT, COMBINED Right 1/21/2022    Procedure: Cystoscopy, right ureteroscopy with holmium laser lithotripsy, right ureteral stent placement,;  Surgeon: Arnel Andrade MD;  Location:  OR     ORTHOPEDIC SURGERY      Torn meniscus 2021       FAMILY HISTORY: No family history on file.    SOCIAL HISTORY:   Social History     Socioeconomic History     Marital status:      Spouse name: None     Number of children: None     Years of education: None     Highest education level: None   Occupational History     None   Tobacco Use     Smoking status: Never Smoker     Smokeless tobacco: Never Used   Vaping Use     Vaping Use: None   Substance and Sexual Activity     Alcohol use: Yes     Comment: 12-14 drinks per  week     Drug use: No     Sexual activity: Yes   Other Topics Concern     Parent/sibling w/ CABG, MI or angioplasty before 65F 55M? Not Asked   Social History Narrative     None     Social Determinants of Health     Financial Resource Strain: Not on file   Food Insecurity: Not on file   Transportation Needs: Not  on file   Physical Activity: Not on file   Stress: Not on file   Social Connections: Not on file   Intimate Partner Violence: Not on file   Housing Stability: Not on file       PHYSICAL EXAM:    .vitals    : Clear, balloon deflated and removed intact  Abd: inc c/d/i    Urinalysis: UA RESULTS:  Recent Labs   Lab Test 11/02/21  1353 05/28/16  1424   COLOR Yellow Light Yellow   APPEARANCE Clear Clear   URINEGLC Negative Negative   URINEBILI Negative Negative   URINEKETONE Negative Negative   SG 1.025 1.003   UBLD Negative Moderate*   URINEPH 7.0 6.5   PROTEIN Negative Negative   UROBILINOGEN 0.2  --    NITRITE Negative Negative   LEUKEST Negative Negative   RBCU  --  26*   WBCU  --  <1       PSA: 9.3 (Pre-OP)    Post Void Residual:     Other labs: None today      IMPRESSION:  intermediate risk prostate cancer, s/p RALP  Hx of kidney stones    PLAN:  -He'll continue to take the potassium citrate until he sees the kidney stone prevention clinic in the spring.  -Dr. Andrade in 3 months with first pot op PSA  -Low dose Viagra 3x per week  -PFPT referral.       Total time spent today in review of outside records and test results, discussion with the patient, and documentation: 25 minutes      Quita Easton PA-C  [facility]  [phone]

## 2022-04-11 NOTE — NURSING NOTE
Chief Complaint   Patient presents with     Prostatectomy     Catheter removal     Maryuri Nicole

## 2022-04-11 NOTE — LETTER
4/11/2022       RE: Magdaleno Marie  [id number]  [place] [address]     Dear Colleague,    Thank you for referring your patient, Magdaleno Marie, to the [facility] at [facility]. Please see a copy of my visit note below.    Office Visit Note  [facility]  [phone]    UROLOGIC DIAGNOSES:   Kidney stones  Intermediate risk prostate cancer    CURRENT INTERVENTIONS:   S/P RALP    HISTORY:   Magdaleno returns to clinic today for De Leon removal after RALP 3/31/22.  He had 4 lesions that were seen on the MRI that were all biopsied and targeted using MRI fusion technology.  One of the lesions on the left side showed a [place] 3+4 equal 7 prostate cancer.  Another one of the lesions showed [place] 3+3 equal 6 prostate cancer in 2 of the lesions were negative for prostate cancer.  On his template biopsies, 3 of the biopsies were positive for prostate cancer on the left side and 2 were positive on the right side.  1 of these lesions on the left side also showed [place] 3+4 equal 7 prostate cancer.  He has no family history of prostate cancer.    He has a hx of kidney stones composed of uric acid mixed with calcium oxalate.  He has an appointment set up from the spring with the kidney stone prevention clinic.  Currently on Uro-Cit K.     Recovering well. No ED issues pre-op.    Surgical path 3/31/22:  Prostate with seminal vesicles, robotic assist laparoscopic radical prostatectomy:  -No lymph nodes received with specimen  -Adenocarcinoma, grade group 3 with 55% pattern 4 tumor, several foci of extraprostatic extension (right mid posterior slides A9 and A10; left posterior superior slides A22 and A26), left seminal vesicle involvement identified, focal perineural invasion identified, negative margins  -Lymphovascular space involvement present (slide A22)    PAST MEDICAL HISTORY:   Past Medical History:   Diagnosis  Date     Calculus of kidney     kidney stones     Hypercholesterolemia      Hypertension      Prostate cancer (H)        PAST SURGICAL HISTORY:   Past Surgical History:   Procedure Laterality Date     COLONOSCOPY       COMBINED CYSTOSCOPY, RETROGRADES, URETEROSCOPY, LASER HOLMIUM LITHOTRIPSY URETER(S), INSERT STENT Left 7/28/2016    Procedure: COMBINED CYSTOSCOPY, RETROGRADES, URETEROSCOPY, LASER HOLMIUM LITHOTRIPSY URETER(S), INSERT STENT;  Surgeon: Arnel Andrade MD;  Location:  OR     COMBINED CYSTOSCOPY, RETROGRADES, URETEROSCOPY, LASER HOLMIUM LITHOTRIPSY URETER(S), INSERT STENT Bilateral 9/9/2019    Procedure: Cystoscopy, bilateral retrograde pyelograms, interpretation of fluoroscopic images, bilateral ureteroscopy with holmium laser lithotripsy and stone basketing, placement of 5 x 26 double-J ureteral stents bilaterally;  Surgeon: Arnel Andrade MD;  Location:  OR     CYSTOSCOPY       DAVINCI PROSTATECTOMY N/A 3/31/2022    Procedure: ROBOTIC ASSISTED LAPAROSCOPIC RADICAL PROSTATECTOMY,  BILATERAL PELVIC LYMPHADENECTOMY;  Surgeon: Arnel Andrade MD;  Location:  OR     GENITOURINARY SURGERY  1980's    shock wave lithotripsy     LASER HOLMIUM LITHOTRIPSY URETER(S), INSERT STENT, COMBINED Right 1/21/2022    Procedure: Cystoscopy, right ureteroscopy with holmium laser lithotripsy, right ureteral stent placement,;  Surgeon: Arnel Andrade MD;  Location:  OR     ORTHOPEDIC SURGERY      Torn meniscus 2021       FAMILY HISTORY: No family history on file.    SOCIAL HISTORY:   Social History     Socioeconomic History     Marital status:      Spouse name: None     Number of children: None     Years of education: None     Highest education level: None   Occupational History     None   Tobacco Use     Smoking status: Never Smoker     Smokeless tobacco: Never Used   Vaping Use     Vaping Use: None   Substance and Sexual Activity     Alcohol use: Yes     Comment: 12-14  drinks per  week     Drug use: No     Sexual activity: Yes   Other Topics Concern     Parent/sibling w/ CABG, MI or angioplasty before 65F 55M? Not Asked   Social History Narrative     None     Social Determinants of Health     Financial Resource Strain: Not on file   Food Insecurity: Not on file   Transportation Needs: Not on file   Physical Activity: Not on file   Stress: Not on file   Social Connections: Not on file   Intimate Partner Violence: Not on file   Housing Stability: Not on file       PHYSICAL EXAM:    .vitals    : Clear, balloon deflated and removed intact  Abd: inc c/d/i    Urinalysis: UA RESULTS:  Recent Labs   Lab Test 11/02/21  1353 05/28/16  1424   COLOR Yellow Light Yellow   APPEARANCE Clear Clear   URINEGLC Negative Negative   URINEBILI Negative Negative   URINEKETONE Negative Negative   SG 1.025 1.003   UBLD Negative Moderate*   URINEPH 7.0 6.5   PROTEIN Negative Negative   UROBILINOGEN 0.2  --    NITRITE Negative Negative   LEUKEST Negative Negative   RBCU  --  26*   WBCU  --  <1       PSA: 9.3 (Pre-OP)    Post Void Residual:     Other labs: None today      IMPRESSION:  intermediate risk prostate cancer, s/p RALP  Hx of kidney stones    PLAN:  -He'll continue to take the potassium citrate until he sees the kidney stone prevention clinic in the spring.  -Dr. Andrade in 3 months with first pot op PSA  -Low dose Viagra 3x per week  -PFPT referral.       Total time spent today in review of outside records and test results, discussion with the patient, and documentation: 25 minutes      Quita Easton PA-C  [facility]  [phone]

## 2022-04-29 ENCOUNTER — OFFICE VISIT (OUTPATIENT)
Dept: UROLOGY | Facility: CLINIC | Age: 67
End: 2022-04-29
Payer: COMMERCIAL

## 2022-04-29 VITALS
SYSTOLIC BLOOD PRESSURE: 140 MMHG | BODY MASS INDEX: 31.36 KG/M2 | HEIGHT: 71 IN | WEIGHT: 224 LBS | DIASTOLIC BLOOD PRESSURE: 70 MMHG

## 2022-04-29 DIAGNOSIS — C61 PROSTATE CANCER (H): Primary | ICD-10-CM

## 2022-04-29 PROCEDURE — 99024 POSTOP FOLLOW-UP VISIT: CPT | Performed by: PHYSICIAN ASSISTANT

## 2022-04-29 ASSESSMENT — PAIN SCALES - GENERAL: PAINLEVEL: NO PAIN (0)

## 2022-04-29 NOTE — PROGRESS NOTES
Office Visit Note  [facility]  [phone]    UROLOGIC DIAGNOSES:   Kidney stones  Intermediate risk prostate cancer    CURRENT INTERVENTIONS:   S/P RALP    HISTORY:   Magdaleno returns to clinic today for De Leon removal after RALP 3/31/22.  He had 4 lesions that were seen on the MRI that were all biopsied and targeted using MRI fusion technology.  One of the lesions on the left side showed a Dysart 3+4 equal 7 prostate cancer.  Another one of the lesions showed Dysart 3+3 equal 6 prostate cancer in 2 of the lesions were negative for prostate cancer.  On his template biopsies, 3 of the biopsies were positive for prostate cancer on the left side and 2 were positive on the right side.  1 of these lesions on the left side also showed Dysart 3+4 equal 7 prostate cancer.  He has no family history of prostate cancer.    He has a hx of kidney stones composed of uric acid mixed with calcium oxalate.  He has an appointment set up from the spring with the kidney stone prevention clinic.  Currently on Uro-Cit K.     No ED issues pre-op.    Returns today for follow-up and has a few questions about his recovery.  The most troublesome aspect is waking 3 x per night. He has a right quad muscle strain and difficulty sleeping on his usual right side. Taking an afternoon nap.       Surgical path 3/31/22:  Prostate with seminal vesicles, robotic assist laparoscopic radical prostatectomy:  -No lymph nodes received with specimen  -Adenocarcinoma, grade group 3 with 55% pattern 4 tumor, several foci of extraprostatic extension (right mid posterior slides A9 and A10; left posterior superior slides A22 and A26), left seminal vesicle involvement identified, focal perineural invasion identified, negative margins  -Lymphovascular space involvement present (slide A22)    PAST MEDICAL HISTORY:   Past Medical History:   Diagnosis Date     Calculus of kidney     kidney stones     Hypercholesterolemia      Hypertension       Prostate cancer (H)        PAST SURGICAL HISTORY:   Past Surgical History:   Procedure Laterality Date     COLONOSCOPY       COMBINED CYSTOSCOPY, RETROGRADES, URETEROSCOPY, LASER HOLMIUM LITHOTRIPSY URETER(S), INSERT STENT Left 7/28/2016    Procedure: COMBINED CYSTOSCOPY, RETROGRADES, URETEROSCOPY, LASER HOLMIUM LITHOTRIPSY URETER(S), INSERT STENT;  Surgeon: Arnel Andrade MD;  Location:  OR     COMBINED CYSTOSCOPY, RETROGRADES, URETEROSCOPY, LASER HOLMIUM LITHOTRIPSY URETER(S), INSERT STENT Bilateral 9/9/2019    Procedure: Cystoscopy, bilateral retrograde pyelograms, interpretation of fluoroscopic images, bilateral ureteroscopy with holmium laser lithotripsy and stone basketing, placement of 5 x 26 double-J ureteral stents bilaterally;  Surgeon: Arnel Andrade MD;  Location:  OR     CYSTOSCOPY       DAVINCI PROSTATECTOMY N/A 3/31/2022    Procedure: ROBOTIC ASSISTED LAPAROSCOPIC RADICAL PROSTATECTOMY,  BILATERAL PELVIC LYMPHADENECTOMY;  Surgeon: Arnel Andrade MD;  Location:  OR     GENITOURINARY SURGERY  1980's    shock wave lithotripsy     LASER HOLMIUM LITHOTRIPSY URETER(S), INSERT STENT, COMBINED Right 1/21/2022    Procedure: Cystoscopy, right ureteroscopy with holmium laser lithotripsy, right ureteral stent placement,;  Surgeon: Arnel Andrade MD;  Location:  OR     ORTHOPEDIC SURGERY      Torn meniscus 2021       FAMILY HISTORY: No family history on file.    SOCIAL HISTORY:   Social History     Socioeconomic History     Marital status:      Spouse name: None     Number of children: None     Years of education: None     Highest education level: None   Occupational History     None   Tobacco Use     Smoking status: Never Smoker     Smokeless tobacco: Never Used   Vaping Use     Vaping Use: None   Substance and Sexual Activity     Alcohol use: Yes     Comment: 12-14 drinks per  week     Drug use: No     Sexual activity: Yes   Other Topics Concern      Parent/sibling w/ CABG, MI or angioplasty before 65F 55M? Not Asked   Social History Narrative     None     Social Determinants of Health     Financial Resource Strain: Not on file   Food Insecurity: Not on file   Transportation Needs: Not on file   Physical Activity: Not on file   Stress: Not on file   Social Connections: Not on file   Intimate Partner Violence: Not on file   Housing Stability: Not on file       PHYSICAL EXAM:    .vitals    : Clear, balloon deflated and removed intact  Abd: inc c/d/i, mild bruising around the RLQ incision, no warmth or erythema noted on abd    Urinalysis: UA RESULTS:  Recent Labs   Lab Test 11/02/21  1353 05/28/16  1424   COLOR Yellow Light Yellow   APPEARANCE Clear Clear   URINEGLC Negative Negative   URINEBILI Negative Negative   URINEKETONE Negative Negative   SG 1.025 1.003   UBLD Negative Moderate*   URINEPH 7.0 6.5   PROTEIN Negative Negative   UROBILINOGEN 0.2  --    NITRITE Negative Negative   LEUKEST Negative Negative   RBCU  --  26*   WBCU  --  <1       PSA: 9.3 (Pre-OP)    Post Void Residual:     Other labs: None today      IMPRESSION:  intermediate risk prostate cancer, s/p RALP  Hx of kidney stones  Recovering well    PLAN:  -Reassurance given. Eliminate napping. Try melatonin 3mg daily.   -He'll continue to take the potassium citrate until he sees the kidney stone prevention clinic in the spring.  -Dr. Andrade in 3 months with first post op PSA  -Low dose Viagra 3x per week  -PFPT next week      Total time spent today in review of outside records and test results, discussion with the patient, and documentation: 20 minutes      Quita Easton PA-C  [facility]  [phone]

## 2022-04-29 NOTE — NURSING NOTE
Chief Complaint   Patient presents with     Follow Up     Examine stomach after prostatectomy     Maryuri Nicole

## 2022-04-29 NOTE — PATIENT INSTRUCTIONS
Melatonin 3mg 20 min prior to bed.     Try to skip afternoon nap.       Tylenol and Motrin ok together.

## 2022-04-29 NOTE — LETTER
4/29/2022       RE: Magdaleno Marie  [id number]  [place] [address]     Dear Colleague,    Thank you for referring your patient, Magdaleno Marie, to the [facility] at [facility]. Please see a copy of my visit note below.    Office Visit Note  [facility]  [phone]    UROLOGIC DIAGNOSES:   Kidney stones  Intermediate risk prostate cancer    CURRENT INTERVENTIONS:   S/P RALP    HISTORY:   Magdaleno returns to clinic today for De Leon removal after RALP 3/31/22.  He had 4 lesions that were seen on the MRI that were all biopsied and targeted using MRI fusion technology.  One of the lesions on the left side showed a Early Branch 3+4 equal 7 prostate cancer.  Another one of the lesions showed Early Branch 3+3 equal 6 prostate cancer in 2 of the lesions were negative for prostate cancer.  On his template biopsies, 3 of the biopsies were positive for prostate cancer on the left side and 2 were positive on the right side.  1 of these lesions on the left side also showed Early Branch 3+4 equal 7 prostate cancer.  He has no family history of prostate cancer.    He has a hx of kidney stones composed of uric acid mixed with calcium oxalate.  He has an appointment set up from the spring with the kidney stone prevention clinic.  Currently on Uro-Cit K.     No ED issues pre-op.    Returns today for follow-up and has a few questions about his recovery.  The most troublesome aspect is waking 3 x per night. He has a right quad muscle strain and difficulty sleeping on his usual right side. Taking an afternoon nap.       Surgical path 3/31/22:  Prostate with seminal vesicles, robotic assist laparoscopic radical prostatectomy:  -No lymph nodes received with specimen  -Adenocarcinoma, grade group 3 with 55% pattern 4 tumor, several foci of extraprostatic extension (right mid posterior slides A9 and A10; left posterior superior slides A22 and  A26), left seminal vesicle involvement identified, focal perineural invasion identified, negative margins  -Lymphovascular space involvement present (slide A22)    PAST MEDICAL HISTORY:   Past Medical History:   Diagnosis Date     Calculus of kidney     kidney stones     Hypercholesterolemia      Hypertension      Prostate cancer (H)        PAST SURGICAL HISTORY:   Past Surgical History:   Procedure Laterality Date     COLONOSCOPY       COMBINED CYSTOSCOPY, RETROGRADES, URETEROSCOPY, LASER HOLMIUM LITHOTRIPSY URETER(S), INSERT STENT Left 7/28/2016    Procedure: COMBINED CYSTOSCOPY, RETROGRADES, URETEROSCOPY, LASER HOLMIUM LITHOTRIPSY URETER(S), INSERT STENT;  Surgeon: Arnel Andrade MD;  Location:  OR     COMBINED CYSTOSCOPY, RETROGRADES, URETEROSCOPY, LASER HOLMIUM LITHOTRIPSY URETER(S), INSERT STENT Bilateral 9/9/2019    Procedure: Cystoscopy, bilateral retrograde pyelograms, interpretation of fluoroscopic images, bilateral ureteroscopy with holmium laser lithotripsy and stone basketing, placement of 5 x 26 double-J ureteral stents bilaterally;  Surgeon: Arnel Andrade MD;  Location:  OR     CYSTOSCOPY       DAVINCI PROSTATECTOMY N/A 3/31/2022    Procedure: ROBOTIC ASSISTED LAPAROSCOPIC RADICAL PROSTATECTOMY,  BILATERAL PELVIC LYMPHADENECTOMY;  Surgeon: Arnel Andrade MD;  Location:  OR     GENITOURINARY SURGERY  1980's    shock wave lithotripsy     LASER HOLMIUM LITHOTRIPSY URETER(S), INSERT STENT, COMBINED Right 1/21/2022    Procedure: Cystoscopy, right ureteroscopy with holmium laser lithotripsy, right ureteral stent placement,;  Surgeon: Arnel Andrade MD;  Location:  OR     ORTHOPEDIC SURGERY      Torn meniscus 2021       FAMILY HISTORY: No family history on file.    SOCIAL HISTORY:   Social History     Socioeconomic History     Marital status:      Spouse name: None     Number of children: None     Years of education: None     Highest education level: None    Occupational History     None   Tobacco Use     Smoking status: Never Smoker     Smokeless tobacco: Never Used   Vaping Use     Vaping Use: None   Substance and Sexual Activity     Alcohol use: Yes     Comment: 12-14 drinks per  week     Drug use: No     Sexual activity: Yes   Other Topics Concern     Parent/sibling w/ CABG, MI or angioplasty before 65F 55M? Not Asked   Social History Narrative     None     Social Determinants of Health     Financial Resource Strain: Not on file   Food Insecurity: Not on file   Transportation Needs: Not on file   Physical Activity: Not on file   Stress: Not on file   Social Connections: Not on file   Intimate Partner Violence: Not on file   Housing Stability: Not on file       PHYSICAL EXAM:    .vitals    : Clear, balloon deflated and removed intact  Abd: inc c/d/i, mild bruising around the RLQ incision, no warmth or erythema noted on abd    Urinalysis: UA RESULTS:  Recent Labs   Lab Test 11/02/21  1353 05/28/16  1424   COLOR Yellow Light Yellow   APPEARANCE Clear Clear   URINEGLC Negative Negative   URINEBILI Negative Negative   URINEKETONE Negative Negative   SG 1.025 1.003   UBLD Negative Moderate*   URINEPH 7.0 6.5   PROTEIN Negative Negative   UROBILINOGEN 0.2  --    NITRITE Negative Negative   LEUKEST Negative Negative   RBCU  --  26*   WBCU  --  <1       PSA: 9.3 (Pre-OP)    Post Void Residual:     Other labs: None today      IMPRESSION:  intermediate risk prostate cancer, s/p RALP  Hx of kidney stones  Recovering well    PLAN:  -Reassurance given. Eliminate napping. Try melatonin 3mg daily.   -He'll continue to take the potassium citrate until he sees the kidney stone prevention clinic in the spring.  -Dr. Andrade in 3 months with first post op PSA  -Low dose Viagra 3x per week  -PFPT next week      Total time spent today in review of outside records and test results, discussion with the patient, and documentation: 20 minutes      Quita Easton PA-C  [facility]  Urology  [phone]

## 2022-04-30 ENCOUNTER — HEALTH MAINTENANCE LETTER (OUTPATIENT)
Age: 67
End: 2022-04-30

## 2022-05-03 ENCOUNTER — THERAPY VISIT (OUTPATIENT)
Dept: PHYSICAL THERAPY | Facility: CLINIC | Age: 67
End: 2022-05-03
Attending: PHYSICIAN ASSISTANT
Payer: COMMERCIAL

## 2022-05-03 DIAGNOSIS — C61 PROSTATE CANCER (H): ICD-10-CM

## 2022-05-03 DIAGNOSIS — N39.46 MIXED INCONTINENCE URGE AND STRESS (MALE)(FEMALE): ICD-10-CM

## 2022-05-03 PROCEDURE — 97112 NEUROMUSCULAR REEDUCATION: CPT | Mod: GP | Performed by: PHYSICAL THERAPIST

## 2022-05-03 PROCEDURE — 97161 PT EVAL LOW COMPLEX 20 MIN: CPT | Mod: GP | Performed by: PHYSICAL THERAPIST

## 2022-05-03 NOTE — PROGRESS NOTES
Physical Therapy Initial Evaluation  Subjective:        SUBJECTIVE:  Patient is s/p prostatectomy on 3-31-22. Catheter was removed on 4-11-22.  Urination:  Do you feel the sensation of your bladder filling?  No How many pads per day are you using? 3 Depends per day  Do you leak on the way to the bathroom or with a strong urge to void? Yes  Do you leak with cough,sneeze, jumping, running? Yes  Do you have triggers that make you feel you can't wait to go to the bathroom? No.  How long can you delay the need to urinate? 31 - 60 minutes.   How many times do you get up to urinate at night? 3x/nigh  Can you stop the flow of urine when on the toilet? Yes  Is the volume of urine passed usually: small. (8sec rule= 250ml with average bladder storing 400-600ml)  Do you strain to pass urine? No  Do you have a slow or hesitant urinary stream? No  Do you have difficulty initiating the urine stream? No  Fluid intake(one glass is 8oz or one cup) 2 glasses/day, 0 caffinated glasses/day  2 alcohol glasses/day.      The history is provided by the patient. No  was used.   Therapist Generated HPI Evaluation         Type of problem:  Incontinence.    This is a chronic condition.  Condition occurred with:  After surgery.  Where condition occurred: other.  Site of Pain: abdominal.  Pain is described as aching and is intermittent.  Pain is the same all the time.  Since onset symptoms are gradually improving.  Symptoms are exacerbated by laughing, coughing and sneezing (activity)  Relieved by: urinating.  Special tests included:  MRI.  Past treatment: none.   Work activity restrictions: retired.  Barriers include:  None as reported by patient.                        Objective:  System                                 Pelvic Dysfunction Evaluation:      Diagnostic Tests:  Diagnostic tests pelvic: MRI see report.                        Flexibility:    Tightness present at:Adductors; Iliopsoas; Hamstrings and  Piriformis    Abdominal Wall:      Trigger Points:  Iliopsoas and external obliques          External Assessment:    Skin Condition:  Normal  Scars:  Well healed  Bearing Down/Coughing:  Normal        Internal Assessment:      Contraction/Grade:  Fair squeeze, definite lift (3)  Accessory Muscle use-Abdominals:  Yes  Accessory Muscle use-Gluteals:  Yes  Accessory Muscle use-Adductors:  Yes    SEMG Biofeedback:    Equipment:  Biofeedback     Suraface electrode placement--Perianal:  Bilateral   Baseline EMG PM:  8 mV    Peak pelvic muscle contraction:  12 mV    EMG interpretation to fatigue:  3-5 seconds  Position:  SupineAdditional History:        Caffeine Consumption:  0                     General     ROS    Assessment/Plan:    Patient is a 66 year old male with pelvic complaints.    Patient has the following significant findings with corresponding treatment plan.                Diagnosis 1:  Post op incontinent   Decreased strength - therapeutic exercise, therapeutic activities and home program  Impaired muscle performance - biofeedback, neuro re-education and home program    Therapy Evaluation Codes:   1) History comprised of:   Personal factors that impact the plan of care:      None.    Comorbidity factors that impact the plan of care are:      kidney stones.     Medications impacting care: High blood pressure, Pain and Sleep.  2) Examination of Body Systems comprised of:   Body structures and functions that impact the plan of care:      Pelvis.   Activity limitations that impact the plan of care are:      Urinary incontinence.  3) Clinical presentation characteristics are:   Stable/Uncomplicated.  4) Decision-Making    Low complexity using standardized patient assessment instrument and/or measureable assessment of functional outcome.  Cumulative Therapy Evaluation is: Low complexity.    Previous and current functional limitations:  (See Goal Flow Sheet for this information)    Short term and Long term goals:  (See Goal Flow Sheet for this information)     Communication ability:  Patient appears to be able to clearly communicate and understand verbal and written communication and follow directions correctly.  Treatment Explanation - The following has been discussed with the patient:   RX ordered/plan of care  Anticipated outcomes  Possible risks and side effects  This patient would benefit from PT intervention to resume normal activities.   Rehab potential is good.    Frequency:  1 X week, once daily  Duration:  for 6 weeks  Discharge Plan:  Achieve all LTG.  Independent in home treatment program.  Reach maximal therapeutic benefit.    Please refer to the daily flowsheet for treatment today, total treatment time and time spent performing 1:1 timed codes.

## 2022-05-03 NOTE — PROGRESS NOTES
[facility]    OUTPATIENT Physical Therapy ORTHOPEDIC EVALUATION  PLAN OF TREATMENT FOR OUTPATIENT REHABILITATION  (COMPLETE FOR INITIAL CLAIMS ONLY)  Patient's Last Name, First Name, M.I.  YOB: 1955  Magdaleno Marie    Provider s Name:  [facility]   Medical Record No.  [id number]   Start of Care Date:  05/03/22   Onset Date:  03/31/22    Type:     _X__PT   ___OT Medical Diagnosis:    Encounter Diagnoses   Name Primary?    Prostate cancer (H)     Mixed incontinence urge and stress (male)(female)         Treatment Diagnosis:  post op incontinence        Goals:     05/03/22 0500   Treating Provider   Treating Provider jaden gentile pt   Pelvic Health Only: Informed Consent   Pelvic Health Informed Consent Statement Discussed with patient/guardian reason for referral regarding pelvic health needs and external/internal pelvic floor muscle examination.  Opportunity provided to ask questions and verbal consent for assessment and intervention was given.   Contact Information   Procedure Code: The timed procedures billed today were performed sequentially within this encounter. Neuromuscular Re-Education;Other   Minutes: Neuromuscular re-education 25   Minutes: Other 35   Rxs Authorized 6   Rxs Used 1   Diagnosis post op incontinence   Insurance Medicare   Total Treatment Time (minutes) 60   Timed Code Treatment Minutes 25   SOC Date 05/03/22   Beginning of Cert date period 05/03/22   End of Cert period date 07/31/22   Therapy Frequency 1x/week   Predicted Duration of Therapy Intervention (days/wks) 6 weeks   DOS 03/31/22   Initial Pain level 2/10   Other Exer/Activities/Educ - All exercises are part of HEP unless otherwise noted   Exercise 1 discussion of condition, treatment and precautions 7 minutes   Description 1 supine roll in with kegel and bio 10 x 5 seconds    Exercise 2 bridge with kegel and bio 10 x 5 seconds   Description 2 supine kegel with bio 10 x 5 seconds   Exercise 3 sitting roll in with kegel and bio 10 x 5 seconds   Description 3 sitting kegel with bio 10 x 5 seconds       Therapy Frequency:  1x/week  Predicted Duration of Therapy Intervention:  6 weeks    Rc Wright, PT                 I CERTIFY THE NEED FOR THESE SERVICES FURNISHED UNDER        THIS PLAN OF TREATMENT AND WHILE UNDER MY CARE     (Physician attestation of this document indicates review and certification of the therapy plan).                     Certification Date From:  05/03/22   Certification Date To:  07/31/22    Referring Provider:  Quita Easton    Initial Assessment        See Epic Evaluation SOC Date: 05/03/22

## 2022-05-10 ENCOUNTER — THERAPY VISIT (OUTPATIENT)
Dept: PHYSICAL THERAPY | Facility: CLINIC | Age: 67
End: 2022-05-10
Attending: PHYSICIAN ASSISTANT
Payer: COMMERCIAL

## 2022-05-10 DIAGNOSIS — N39.46 MIXED INCONTINENCE URGE AND STRESS (MALE)(FEMALE): ICD-10-CM

## 2022-05-10 DIAGNOSIS — C61 PROSTATE CANCER (H): Primary | ICD-10-CM

## 2022-05-10 PROCEDURE — 97110 THERAPEUTIC EXERCISES: CPT | Mod: GP | Performed by: PHYSICAL THERAPIST

## 2022-05-10 PROCEDURE — 97112 NEUROMUSCULAR REEDUCATION: CPT | Mod: GP | Performed by: PHYSICAL THERAPIST

## 2022-05-17 ENCOUNTER — MEDICAL CORRESPONDENCE (OUTPATIENT)
Dept: HEALTH INFORMATION MANAGEMENT | Facility: CLINIC | Age: 67
End: 2022-05-17

## 2022-05-17 ENCOUNTER — VIRTUAL VISIT (OUTPATIENT)
Dept: NEPHROLOGY | Facility: CLINIC | Age: 67
End: 2022-05-17
Attending: UROLOGY
Payer: COMMERCIAL

## 2022-05-17 ENCOUNTER — THERAPY VISIT (OUTPATIENT)
Dept: PHYSICAL THERAPY | Facility: CLINIC | Age: 67
End: 2022-05-17
Attending: PHYSICIAN ASSISTANT

## 2022-05-17 DIAGNOSIS — E66.811 CLASS 1 OBESITY DUE TO EXCESS CALORIES WITHOUT SERIOUS COMORBIDITY WITH BODY MASS INDEX (BMI) OF 33.0 TO 33.9 IN ADULT: ICD-10-CM

## 2022-05-17 DIAGNOSIS — N20.0 KIDNEY STONE: ICD-10-CM

## 2022-05-17 DIAGNOSIS — N39.46 MIXED INCONTINENCE URGE AND STRESS (MALE)(FEMALE): ICD-10-CM

## 2022-05-17 DIAGNOSIS — I10 HYPERTENSION, ESSENTIAL: ICD-10-CM

## 2022-05-17 DIAGNOSIS — E66.09 CLASS 1 OBESITY DUE TO EXCESS CALORIES WITHOUT SERIOUS COMORBIDITY WITH BODY MASS INDEX (BMI) OF 33.0 TO 33.9 IN ADULT: ICD-10-CM

## 2022-05-17 DIAGNOSIS — C61 PROSTATE CANCER (H): Primary | ICD-10-CM

## 2022-05-17 DIAGNOSIS — N20.0 RECURRENT KIDNEY STONES: ICD-10-CM

## 2022-05-17 PROCEDURE — 97110 THERAPEUTIC EXERCISES: CPT | Mod: GP | Performed by: PHYSICAL THERAPIST

## 2022-05-17 PROCEDURE — 97112 NEUROMUSCULAR REEDUCATION: CPT | Mod: GP | Performed by: PHYSICAL THERAPIST

## 2022-05-17 PROCEDURE — 99204 OFFICE O/P NEW MOD 45 MIN: CPT | Mod: 95 | Performed by: INTERNAL MEDICINE

## 2022-05-17 NOTE — PROGRESS NOTES
"[facility] Nephrology Comprehensive Stone [facility]  05/17/2022     Magdaleno Marie MRN:[id number] YOB: 1955  Primary care provider: Twin Rich  Requesting physician: Arnel Andrade     ASSESSMENT AND RECOMMENDATIONS:   Magdaleno Marie is a 66 year old presenting for nephrolithiasis.  # Recurrent kidney stones: stone type calcium oxalate and uric acid, several analysis reports reviewed from [id number]  - does not have hypercalcemia  - no diabetes which is often associated with uric acid kidney stone but does have elevated blood sugar at times and BMI is in obese range  - potassium citrate 20 meq bid  - risk for recurrence related to high animal protein intake (8+ ounce servings) and fluid intake that is below 100 ounces per day  - last urine pH was 6 ([facility] records, Jan 17th 2022)    At risk for making more kidney stones so will benefit from urine chemistry/supersaturation evaluation and monitoring and regular follow up for kidney stone prevention.    Other co-morbidities:    # HTN - lisinopril 20 mg daily, thiazide may be used for dual purpose of BP control and stone prevention - will consider if 24 hour urine calcium is high  - monitor potassium levels with ACE inhibitor and potassium citrate (total 40 meq per day)  # elevated blood sugar - monitored by PCP Dr. Twin Rich  # obesity - BMI 33.7- 5'11\" 242 lbs    # adenocarcinoma of the prostate - s/p robotic assist laparoscopic radical prostatectomy 3/31/2022  Known issue that I take into account for other medical decisions, no current exacerbations or new concerns.     ordered 24 hour chemistries to be completed by July 2022  Repeat imaging per Dr. Andrade  Return in about 3 months (around 8/17/2022).       Mayi Redmond MD  [facility]  Department of Medicine  Division of Renal Disease and Hypertension  [employer] Web Console        REASON FOR CONSULT: nephrolithiasis    HISTORY OF " PRESENT ILLNESS:  Magdaleno Marie is a 66 year old with recurrent kidney stone. Has had 8 episodes of kidney stones over 30 years, passed 4-5 stones.    Several operations for kidney stones, passed stone fall 2021. Also diagnosed with intermediate risk prostate cancer Dec 2021    Stone surgical history: Jan 21 2022- right URS and laser lithotripsy, stone basketing  September 9 2019 - bilateral URS and laser lithotripsy  July 28 2016 - left URS and laser lithotripsy    Has been on potassium citrate 20 meq bid for about one year. Has cut back on red meat. Tries to drink 50 ounces of water per day.  No side effect from potassium citrate.  Stopped caffeine, dialed back on milk, no soda. cocktail occasionally, 2 glasses wine in the evening.    Magdaleno Marie's diet consists of 3 meals per day.    1st meal oatmeal, cereal, fruit, glass orange juice  2nd meal salad with chicken or fish, occasional hamburger (fast food or restaurant)  3rd meal - cut back on steak, usually chicken or fish, potato, corn, broccoli, asparagus  Snacks pistachio or walnuts (unsalted/low salt), fruit like apple or orange, lencho    Sodium intake is no added salt but does use canned tuna at times on salad    Calcium intake is low    Has not done 24 hour urine testing    REVIEW OF SYSTEMS:  A 10 point review of systems was negative except as noted above.    Problem list  Patient Active Problem List   Diagnosis     Prostate cancer (H)     Mixed incontinence urge and stress (male)(female)     PSH  Past Surgical History:   Procedure Laterality Date     COLONOSCOPY       COMBINED CYSTOSCOPY, RETROGRADES, URETEROSCOPY, LASER HOLMIUM LITHOTRIPSY URETER(S), INSERT STENT Left 7/28/2016    Procedure: COMBINED CYSTOSCOPY, RETROGRADES, URETEROSCOPY, LASER HOLMIUM LITHOTRIPSY URETER(S), INSERT STENT;  Surgeon: Arnel Andrade MD;  Location: [facility] OR     COMBINED CYSTOSCOPY, RETROGRADES, URETEROSCOPY, LASER HOLMIUM LITHOTRIPSY URETER(S), INSERT STENT  Bilateral 9/9/2019    Procedure: Cystoscopy, bilateral retrograde pyelograms, interpretation of fluoroscopic images, bilateral ureteroscopy with holmium laser lithotripsy and stone basketing, placement of 5 x 26 double-J ureteral stents bilaterally;  Surgeon: Arnel Andrade MD;  Location:  OR     CYSTOSCOPY       DAVINCI PROSTATECTOMY N/A 3/31/2022    Procedure: ROBOTIC ASSISTED LAPAROSCOPIC RADICAL PROSTATECTOMY,  BILATERAL PELVIC LYMPHADENECTOMY;  Surgeon: Arnel Andrade MD;  Location:  OR     GENITOURINARY SURGERY  1980's    shock wave lithotripsy     LASER HOLMIUM LITHOTRIPSY URETER(S), INSERT STENT, COMBINED Right 1/21/2022    Procedure: Cystoscopy, right ureteroscopy with holmium laser lithotripsy, right ureteral stent placement,;  Surgeon: Arnel Andrade MD;  Location:  OR     ORTHOPEDIC SURGERY      Torn meniscus 2021       Social  Social History     Socioeconomic History     Marital status:      Spouse name: Not on file     Number of children: Not on file     Years of education: Not on file     Highest education level: Not on file   Occupational History     Not on file   Tobacco Use     Smoking status: Never Smoker     Smokeless tobacco: Never Used   Vaping Use     Vaping Use: Not on file   Substance and Sexual Activity     Alcohol use: Yes     Comment: 12-14 drinks per  week     Drug use: No     Sexual activity: Yes   Other Topics Concern     Parent/sibling w/ CABG, MI or angioplasty before 65F 55M? Not Asked   Social History Narrative     Not on file     Social Determinants of Health     Financial Resource Strain: Not on file   Food Insecurity: Not on file   Transportation Needs: Not on file   Physical Activity: Not on file   Stress: Not on file   Social Connections: Not on file   Intimate Partner Violence: Not on file   Housing Stability: Not on file     Family history  Pre chart review - ALS mother       MEDICATIONS:  Current Outpatient Medications   Medication  Sig Dispense Refill     acetaminophen (TYLENOL) 500 MG tablet Take 500 mg by mouth every 6 hours as needed for mild pain       lisinopril (PRINIVIL/ZESTRIL) 10 MG tablet Take 10 mg by mouth daily       potassium citrate (UROCIT-K) 10 MEQ (1080 MG) CR tablet Take 20 mEq by mouth in the morning and 20 mEq in the evening. (2 x 10 meq = 20 meq)       SENNA-docusate sodium (SENNA S) 8.6-50 MG tablet Take 1 tablet by mouth At Bedtime 30 tablet 0     sildenafil (VIAGRA) 50 MG tablet Take 1 tablet (50 mg) by mouth three times a week 36 tablet 0        ALLERGIES:    Allergies   Allergen Reactions     No Known Drug Allergies          PHYSICAL EXAM:   GENERAL APPEARANCE: alert and no distress  RESP: normal work of breathing, no conversational dyspnea  NEURO: mentation intact and speech normal    LABS:   I reviewed:  Electrolytes/Renal -   Recent Labs   Lab Test 04/01/22  0635 04/01/22  0629 03/31/22  1030 01/21/22  1205 09/09/19  1032 05/28/16  1501   NA  --  136  --  134  --  139   POTASSIUM  --  4.4 3.9 4.1   < > 3.8   CHLORIDE  --  106  --  104  --  104   CO2  --  26  --  26  --  25   BUN  --  22  --  24  --  22   CR  --  1.12 1.23 1.00   < > 0.97   * 108*  --  104*  --  99   CHRISTOPH  --  8.5  --  9.0  --  9.0    < > = values in this interval not displayed.       CBC -   Recent Labs   Lab Test 04/01/22  0629 05/28/16  1501   WBC 13.5* 8.6   HGB 14.2 16.3    141*       IMAGING:  reviewed    Mayi Redmond MD

## 2022-05-17 NOTE — PROGRESS NOTES
DANNIE is a 66 year old who is being evaluated via a billable video visit.      How would you like to obtain your AVS? MyChart  If the video visit is dropped, the invitation should be resent by: Text to cell phone: [phone]  Will anyone else be joining your video visit? No      Video Start Time: 4:08 PM  Video-Visit Details    Type of service:  Video Visit    Video End Time:4:29 PM    Originating Location (pt. Location): Home    Distant Location (provider location):  [facility] NEPHROLOGY [facility]     Platform used for Video Visit: [employer]

## 2022-05-17 NOTE — LETTER
"5/17/2022       RE: Magdaleno Marie  [address]  [facility] [address]     Dear Colleague,    Thank you for referring your patient, Magdaleno Marie, to the [facility] NEPHROLOGY CLINIC [place] [facility]. Please see a copy of my visit note below.    [facility]  05/17/2022     Magdaleno Marie MRN:[id number] YOB: 1955  Primary care provider: Twin Rich  Requesting physician: Arnel Andrade     ASSESSMENT AND RECOMMENDATIONS:   Magdaleno Marie is a 66 year old presenting for nephrolithiasis.  # Recurrent kidney stones: stone type calcium oxalate and uric acid, several analysis reports reviewed from [id number]  - does not have hypercalcemia  - no diabetes which is often associated with uric acid kidney stone but does have elevated blood sugar at times and BMI is in obese range  - potassium citrate 20 meq bid  - risk for recurrence related to high animal protein intake (8+ ounce servings) and fluid intake that is below 100 ounces per day  - last urine pH was 6 ([facility] records, Jan 17th 2022)    At risk for making more kidney stones so will benefit from urine chemistry/supersaturation evaluation and monitoring and regular follow up for kidney stone prevention.    Other co-morbidities:    # HTN - lisinopril 20 mg daily, thiazide may be used for dual purpose of BP control and stone prevention - will consider if 24 hour urine calcium is high  - monitor potassium levels with ACE inhibitor and potassium citrate (total 40 meq per day)  # elevated blood sugar - monitored by PCP Dr. Twin Rich  # obesity - BMI 33.7- 5'11\" 242 lbs    # adenocarcinoma of the prostate - s/p robotic assist laparoscopic radical prostatectomy 3/31/2022  Known issue that I take into account for other medical decisions, no current exacerbations or new concerns.     ordered 24 hour chemistries to be " completed by July 2022  Repeat imaging per Dr. Andrade  Return in about 3 months (around 8/17/2022).       Mayi Redmond MD  [facility]  Department of Medicine  Division of Renal Disease and Hypertension  [employer]  [employer] Web Console        REASON FOR CONSULT: nephrolithiasis    HISTORY OF PRESENT ILLNESS:  Magdaleno Marie is a 66 year old with recurrent kidney stone. Has had 8 episodes of kidney stones over 30 years, passed 4-5 stones.    Several operations for kidney stones, passed stone fall 2021. Also diagnosed with intermediate risk prostate cancer Dec 2021    Stone surgical history: Jan 21 2022- right URS and laser lithotripsy, stone basketing  September 9 2019 - bilateral URS and laser lithotripsy  July 28 2016 - left URS and laser lithotripsy    Has been on potassium citrate 20 meq bid for about one year. Has cut back on red meat. Tries to drink 50 ounces of water per day.  No side effect from potassium citrate.  Stopped caffeine, dialed back on milk, no soda. cocktail occasionally, 2 glasses wine in the evening.    Magdaleno Marie's diet consists of 3 meals per day.    1st meal oatmeal, cereal, fruit, glass orange juice  2nd meal salad with chicken or fish, occasional hamburger (fast food or restaurant)  3rd meal - cut back on steak, usually chicken or fish, potato, corn, broccoli, asparagus  Snacks pistachio or walnuts (unsalted/low salt), fruit like apple or orange, lencho    Sodium intake is no added salt but does use canned tuna at times on salad    Calcium intake is low    Has not done 24 hour urine testing    REVIEW OF SYSTEMS:  A 10 point review of systems was negative except as noted above.    Problem list  Patient Active Problem List   Diagnosis     Prostate cancer (H)     Mixed incontinence urge and stress (male)(female)     PSH  Past Surgical History:   Procedure Laterality Date     COLONOSCOPY       COMBINED CYSTOSCOPY, RETROGRADES, URETEROSCOPY, LASER  HOLMIUM LITHOTRIPSY URETER(S), INSERT STENT Left 7/28/2016    Procedure: COMBINED CYSTOSCOPY, RETROGRADES, URETEROSCOPY, LASER HOLMIUM LITHOTRIPSY URETER(S), INSERT STENT;  Surgeon: Arnel Andrade MD;  Location:  OR     COMBINED CYSTOSCOPY, RETROGRADES, URETEROSCOPY, LASER HOLMIUM LITHOTRIPSY URETER(S), INSERT STENT Bilateral 9/9/2019    Procedure: Cystoscopy, bilateral retrograde pyelograms, interpretation of fluoroscopic images, bilateral ureteroscopy with holmium laser lithotripsy and stone basketing, placement of 5 x 26 double-J ureteral stents bilaterally;  Surgeon: Arnel Andrade MD;  Location:  OR     CYSTOSCOPY       DAVINCI PROSTATECTOMY N/A 3/31/2022    Procedure: ROBOTIC ASSISTED LAPAROSCOPIC RADICAL PROSTATECTOMY,  BILATERAL PELVIC LYMPHADENECTOMY;  Surgeon: Arnel Andrade MD;  Location:  OR     GENITOURINARY SURGERY  1980's    shock wave lithotripsy     LASER HOLMIUM LITHOTRIPSY URETER(S), INSERT STENT, COMBINED Right 1/21/2022    Procedure: Cystoscopy, right ureteroscopy with holmium laser lithotripsy, right ureteral stent placement,;  Surgeon: Arnel Andrade MD;  Location:  OR     ORTHOPEDIC SURGERY      Torn meniscus 2021       Social  Social History     Socioeconomic History     Marital status:      Spouse name: Not on file     Number of children: Not on file     Years of education: Not on file     Highest education level: Not on file   Occupational History     Not on file   Tobacco Use     Smoking status: Never Smoker     Smokeless tobacco: Never Used   Vaping Use     Vaping Use: Not on file   Substance and Sexual Activity     Alcohol use: Yes     Comment: 12-14 drinks per  week     Drug use: No     Sexual activity: Yes   Other Topics Concern     Parent/sibling w/ CABG, MI or angioplasty before 65F 55M? Not Asked   Social History Narrative     Not on file     Social Determinants of Health     Financial Resource Strain: Not on file   Food  Insecurity: Not on file   Transportation Needs: Not on file   Physical Activity: Not on file   Stress: Not on file   Social Connections: Not on file   Intimate Partner Violence: Not on file   Housing Stability: Not on file     Family history  Pre chart review - ALS mother       MEDICATIONS:  Current Outpatient Medications   Medication Sig Dispense Refill     acetaminophen (TYLENOL) 500 MG tablet Take 500 mg by mouth every 6 hours as needed for mild pain       lisinopril (PRINIVIL/ZESTRIL) 10 MG tablet Take 10 mg by mouth daily       potassium citrate (UROCIT-K) 10 MEQ (1080 MG) CR tablet Take 20 mEq by mouth in the morning and 20 mEq in the evening. (2 x 10 meq = 20 meq)       SENNA-docusate sodium (SENNA S) 8.6-50 MG tablet Take 1 tablet by mouth At Bedtime 30 tablet 0     sildenafil (VIAGRA) 50 MG tablet Take 1 tablet (50 mg) by mouth three times a week 36 tablet 0        ALLERGIES:    Allergies   Allergen Reactions     No Known Drug Allergies          PHYSICAL EXAM:   GENERAL APPEARANCE: alert and no distress  RESP: normal work of breathing, no conversational dyspnea  NEURO: mentation intact and speech normal    LABS:   I reviewed:  Electrolytes/Renal -   Recent Labs   Lab Test 04/01/22  0635 04/01/22  0629 03/31/22  1030 01/21/22  1205 09/09/19  1032 05/28/16  1501   NA  --  136  --  134  --  139   POTASSIUM  --  4.4 3.9 4.1   < > 3.8   CHLORIDE  --  106  --  104  --  104   CO2  --  26  --  26  --  25   BUN  --  22  --  24  --  22   CR  --  1.12 1.23 1.00   < > 0.97   * 108*  --  104*  --  99   CHRISTOPH  --  8.5  --  9.0  --  9.0    < > = values in this interval not displayed.       CBC -   Recent Labs   Lab Test 04/01/22  0629 05/28/16  1501   WBC 13.5* 8.6   HGB 14.2 16.3    141*       IMAGING:  reviewed    Mayi Redmond MD         DANNIE is a 66 year old who is being evaluated via a billable video visit.      How would you like to obtain your AVS? MyChart  If the video visit is dropped, the  invitation should be resent by: Text to cell phone: [phone]  Will anyone else be joining your video visit? No      Video Start Time: 4:08 PM  Video-Visit Details    Type of service:  Video Visit    Video End Time:4:29 PM    Originating Location (pt. Location): Home    Distant Location (provider location):  [facility] NEPHROLOGY CLINIC [place]     Platform used for Video Visit: [employer]

## 2022-05-17 NOTE — PATIENT INSTRUCTIONS
Deajulieth Marie      Your were seen in the [facility] Comprehensive [facility] Stone [facility].    Basic advice to avoid kidney stones    - Drink 100 ounces of fluid daily (urine volume should be 80 ounces per day)  - low sodium diet (less than 2000 mg sodium per day- aim for 500 mg or less per meal)  - minimize processed foods  - three servings daily of food/beverage high in calcium.  Please have one high calcium food three times a day with meals - can be either 8 oz milk, 6 oz yogurt or 2 oz low sodium cheese or 8 oz calcium fortified OJ/dairy alternative)   - unsweetened flax milk is the preferred dairy alternative  ---- Total should be at least 1000 mg calcium a day from food    Non-Dairy Calcium Sources:    Fortified Coconut, Rice, Flax, Oat milk  (avoid almond milk)    Calcium fortified Orange Juice (look for lower sugar variety)    Canned pink salmon with bones (needs to be low sodium)    Fortified cereals or oatmeal    Broccoli, peas, chinese cabbage/bok jackelyn, kale, mustard greens, red kidney beans     - Protein (meat) in moderation  - 0.8-1 gm/kg/day    Higher fruit and vegetable intake and low added sugars  Some good options include: (higher alkali fruits and vegetables)  -apples, apricots, oranges (the pith contains oxalate so be mindful of portions), peaches, pears, raisins, strawberries, carrots, cauliflower, eggplant, lettuce, potatoes, tomatoes, and zucchini      Today we discussed:  -keep animal protein serving to about 3 oz per meal - that is about the size of a deck of cards  - fluid intake goal is 100 ounces per day  - low salt diet  - unsalt pistachios or unsalted pecans are OK    [employer] should be completed by July 2022    Return in about 3 months (around 8/17/2022).     It was a pleasure meeting with you today. Thank you for allowing me and my team the privilege of caring for you today. Please let us know if there is anything else we can do for you.    Take care!  Mayi Redmond  MD  Department of Medicine  Division of Renal Diseases and Hypertension  [facility]    You may reach a nurse by calling the urology clinic at [phone]

## 2022-05-24 ENCOUNTER — THERAPY VISIT (OUTPATIENT)
Dept: PHYSICAL THERAPY | Facility: CLINIC | Age: 67
End: 2022-05-24
Payer: COMMERCIAL

## 2022-05-24 DIAGNOSIS — C61 PROSTATE CANCER (H): Primary | ICD-10-CM

## 2022-05-24 DIAGNOSIS — N39.46 MIXED INCONTINENCE URGE AND STRESS (MALE)(FEMALE): ICD-10-CM

## 2022-05-24 PROCEDURE — 97112 NEUROMUSCULAR REEDUCATION: CPT | Mod: GP | Performed by: PHYSICAL THERAPIST

## 2022-05-24 PROCEDURE — 97110 THERAPEUTIC EXERCISES: CPT | Mod: GP | Performed by: PHYSICAL THERAPIST

## 2022-06-08 ENCOUNTER — TELEPHONE (OUTPATIENT)
Dept: MULTI SPECIALTY CLINIC | Facility: CLINIC | Age: 67
End: 2022-06-08
Payer: COMMERCIAL

## 2022-06-13 ENCOUNTER — TELEPHONE (OUTPATIENT)
Dept: UROLOGY | Facility: CLINIC | Age: 67
End: 2022-06-13
Payer: COMMERCIAL

## 2022-06-13 NOTE — TELEPHONE ENCOUNTER
Returned phone call and spoke with patient. Per MD-   Incontinence still to be expected at this point   I would use bacitracin PRN to tip of penis for the irritation   Continue with plan to see me in July    Message text       Informed patient of message above and he will call back if things get worse and plan to see MD as planned in July.     Judy Barrow LPN

## 2022-06-13 NOTE — TELEPHONE ENCOUNTER
[facility] Call Center    Phone Message    May a detailed message be left on voicemail: yes     Reason for Call: Symptoms or Concerns     If patient has red-flag symptoms, warm transfer to triage line    Current symptom or concern: prostate surgery end of March - still having incontinence, discomfort passing urine due to tip of penis rubbing on depends, tip of penis swollen    Symptoms have been present for:  Last evening    Has patient previously been seen for this? No    Are there any new or worsening symptoms? Yes: starting to get a little worse.      Action Taken: Message routed to:  Other: Uro    Travel Screening: Not Applicable

## 2022-06-23 ENCOUNTER — THERAPY VISIT (OUTPATIENT)
Dept: PHYSICAL THERAPY | Facility: CLINIC | Age: 67
End: 2022-06-23
Payer: COMMERCIAL

## 2022-06-23 DIAGNOSIS — C61 PROSTATE CANCER (H): Primary | ICD-10-CM

## 2022-06-23 DIAGNOSIS — N39.46 MIXED INCONTINENCE URGE AND STRESS (MALE)(FEMALE): ICD-10-CM

## 2022-06-23 PROCEDURE — 97110 THERAPEUTIC EXERCISES: CPT | Mod: GP | Performed by: PHYSICAL THERAPIST

## 2022-06-23 PROCEDURE — 90913 BFB TRAINING EA ADDL 15 MIN: CPT | Mod: GP | Performed by: PHYSICAL THERAPIST

## 2022-06-23 PROCEDURE — 90912 BFB TRAINING 1ST 15 MIN: CPT | Mod: GP | Performed by: PHYSICAL THERAPIST

## 2022-07-28 ENCOUNTER — OFFICE VISIT (OUTPATIENT)
Dept: UROLOGY | Facility: CLINIC | Age: 67
End: 2022-07-28
Payer: COMMERCIAL

## 2022-07-28 VITALS
BODY MASS INDEX: 31.08 KG/M2 | WEIGHT: 222 LBS | SYSTOLIC BLOOD PRESSURE: 138 MMHG | HEART RATE: 74 BPM | HEIGHT: 71 IN | DIASTOLIC BLOOD PRESSURE: 81 MMHG | OXYGEN SATURATION: 98 %

## 2022-07-28 DIAGNOSIS — Z85.46 PERSONAL HISTORY OF MALIGNANT NEOPLASM OF PROSTATE: Primary | ICD-10-CM

## 2022-07-28 DIAGNOSIS — N52.9 ERECTILE DYSFUNCTION, UNSPECIFIED ERECTILE DYSFUNCTION TYPE: ICD-10-CM

## 2022-07-28 LAB — PSA SERPL-MCNC: <0.04 UG/L (ref 0–4)

## 2022-07-28 PROCEDURE — 99213 OFFICE O/P EST LOW 20 MIN: CPT | Performed by: UROLOGY

## 2022-07-28 PROCEDURE — 36415 COLL VENOUS BLD VENIPUNCTURE: CPT | Performed by: UROLOGY

## 2022-07-28 PROCEDURE — 84153 ASSAY OF PSA TOTAL: CPT | Performed by: UROLOGY

## 2022-07-28 RX ORDER — SILDENAFIL 100 MG/1
100 TABLET, FILM COATED ORAL DAILY PRN
Qty: 10 TABLET | Refills: 4 | Status: SHIPPED | OUTPATIENT
Start: 2022-07-28 | End: 2022-11-03

## 2022-07-28 ASSESSMENT — PAIN SCALES - GENERAL: PAINLEVEL: NO PAIN (0)

## 2022-07-28 NOTE — NURSING NOTE
"Chief Complaint   Patient presents with     Hx of Prostate Cancer     Patient here today for SD PSA and Exam       Blood pressure 138/81, pulse 74, height 1.803 m (5' 11\"), weight 100.7 kg (222 lb), SpO2 98 %. Body mass index is 30.96 kg/m .    Patient Active Problem List   Diagnosis     Prostate cancer (H)     Mixed incontinence urge and stress (male)(female)     Recurrent kidney stones     Hypertension, essential     Class 1 obesity due to excess calories without serious comorbidity with body mass index (BMI) of 33.0 to 33.9 in adult       Allergies   Allergen Reactions     No Known Drug Allergies        Current Outpatient Medications   Medication Sig Dispense Refill     acetaminophen (TYLENOL) 500 MG tablet Take 500 mg by mouth every 6 hours as needed for mild pain       lisinopril (PRINIVIL/ZESTRIL) 10 MG tablet Take 10 mg by mouth daily       potassium citrate (UROCIT-K) 10 MEQ (1080 MG) CR tablet Take 20 mEq by mouth in the morning and 20 mEq in the evening. (2 x 10 meq = 20 meq)       SENNA-docusate sodium (SENNA S) 8.6-50 MG tablet Take 1 tablet by mouth At Bedtime (Patient not taking: Reported on 7/28/2022) 30 tablet 0     sildenafil (VIAGRA) 50 MG tablet Take 1 tablet (50 mg) by mouth three times a week (Patient not taking: No sig reported) 36 tablet 0       Social History     Tobacco Use     Smoking status: Never Smoker     Smokeless tobacco: Never Used   Substance Use Topics     Alcohol use: Yes     Comment: 12-14 drinks per  week     Drug use: No     Patient here after having Prostate removed now has PSA drawn.      PENNIE Torres  7/28/2022  9:17 AM       "

## 2022-07-28 NOTE — LETTER
7/28/2022       RE: Magdaleno Marie  [id number]  [place] [address]     Dear Colleague,    Thank you for referring your patient, Magdaleno Marie, to the [facility] at [facility]. Please see a copy of my visit note below.    Office Visit Note  [facility]  [phone]    UROLOGIC DIAGNOSES:   pT3b [place] 4+3 equal 7 prostate cancer  Erectile dysfunction    CURRENT INTERVENTIONS:   Robotic prostatectomy in March  Viagra    HISTORY:   Magdaleno returns to clinic today for prostate cancer follow-up.  His pathology showed Cherri 4+3 equals 7 prostate cancer with multiple areas of extraprostatic extension as well as seminal vesicle invasion.  The margins were negative.  He reports feeling well since his surgery.  He had significant incontinence at first but now he is not leaking at night and is using 3 pads per day.  He has a good urinary stream.  He has been taking Viagra 3 times weekly and has had partial erections.  His first postoperative PSA today is undetectable.      PAST MEDICAL HISTORY:   Past Medical History:   Diagnosis Date     Calculus of kidney     kidney stones     Hypercholesterolemia      Hypertension      Prostate cancer (H)        PAST SURGICAL HISTORY:   Past Surgical History:   Procedure Laterality Date     COLONOSCOPY       COMBINED CYSTOSCOPY, RETROGRADES, URETEROSCOPY, LASER HOLMIUM LITHOTRIPSY URETER(S), INSERT STENT Left 7/28/2016    Procedure: COMBINED CYSTOSCOPY, RETROGRADES, URETEROSCOPY, LASER HOLMIUM LITHOTRIPSY URETER(S), INSERT STENT;  Surgeon: Arnel Andrade MD;  Location:  OR     COMBINED CYSTOSCOPY, RETROGRADES, URETEROSCOPY, LASER HOLMIUM LITHOTRIPSY URETER(S), INSERT STENT Bilateral 9/9/2019    Procedure: Cystoscopy, bilateral retrograde pyelograms, interpretation of fluoroscopic images, bilateral ureteroscopy with holmium laser lithotripsy and stone basketing,  placement of 5 x 26 double-J ureteral stents bilaterally;  Surgeon: Arnel Andrade MD;  Location:  OR     CYSTOSCOPY       DAVINCI PROSTATECTOMY N/A 3/31/2022    Procedure: ROBOTIC ASSISTED LAPAROSCOPIC RADICAL PROSTATECTOMY,  BILATERAL PELVIC LYMPHADENECTOMY;  Surgeon: Arnel Andrade MD;  Location:  OR     GENITOURINARY SURGERY  1980's    shock wave lithotripsy     LASER HOLMIUM LITHOTRIPSY URETER(S), INSERT STENT, COMBINED Right 1/21/2022    Procedure: Cystoscopy, right ureteroscopy with holmium laser lithotripsy, right ureteral stent placement,;  Surgeon: Arnel Andrade MD;  Location:  OR     ORTHOPEDIC SURGERY      Torn meniscus 2021       FAMILY HISTORY: History reviewed. No pertinent family history.    SOCIAL HISTORY:   Social History     Socioeconomic History     Marital status:      Spouse name: None     Number of children: None     Years of education: None     Highest education level: None   Tobacco Use     Smoking status: Never Smoker     Smokeless tobacco: Never Used   Substance and Sexual Activity     Alcohol use: Yes     Comment: 12-14 drinks per  week     Drug use: No     Sexual activity: Yes       Review Of Systems:  Skin: No rash, pruritis, or skin pigmentation  Eyes: No changes in vision  Ears/Nose/Throat: No changes in hearing, no nosebleeds  Respiratory: No shortness of breath, dyspnea on exertion, cough, or hemoptysis  Cardiovascular: No chest pain or palpitations  Gastrointestinal: No diarrhea or constipation. No abdominal pain. No hematochezia  Genitourinary: see HPI  Musculoskeletal: No pain or swelling of joints, normal range of motion  Neurologic: No weakness or tremors  Psychiatric: No recent changes in memory or mood  Hematologic/Lymphatic/Immunologic: No easy bruising or enlarged lymph nodes  Endocrine: No weight gain or loss      PHYSICAL EXAM:    /81   Pulse 74   SpO2 98%     Constitutional: Well developed. Conversant and in no acute  distress  Eyes: Anicteric sclera, conjunctiva clear, normal extraocular movements  ENT: Normocephalic and atraumatic,   Skin: Warm and dry. No rashes or lesions  Cardiac: No peripheral edema  Back/Flank: Not done  CNS/PNS: Normal musculature and movements, moves all extremities normally  Respiratory: Normal non-labored breathing  Abdomen: Soft nontender and nondistended  Peripheral Vascular: No peripheral edema  Mental Status/Psych: Alert and Oriented x 3. Normal mood and affect    Penis: Not done  Scrotal Skin: Not done  Testicles: Not done  Epididymis: Not done  Digital Rectal Exam:     Cystoscopy: Not done    Imaging: None    Urinalysis: UA RESULTS:  Recent Labs   Lab Test 11/02/21  1353 05/28/16  1424   COLOR Yellow Light Yellow   APPEARANCE Clear Clear   URINEGLC Negative Negative   URINEBILI Negative Negative   URINEKETONE Negative Negative   SG 1.025 1.003   UBLD Negative Moderate*   URINEPH 7.0 6.5   PROTEIN Negative Negative   UROBILINOGEN 0.2  --    NITRITE Negative Negative   LEUKEST Negative Negative   RBCU  --  26*   WBCU  --  <1       PSA: Undetectable    Post Void Residual:     Other labs: None today      IMPRESSION:  Doing well, PSA undetectable    PLAN:  We discussed the treatment and surveillance plan after his prostatectomy.  He has been doing pelvic floor physical therapy and he will resume Kegel exercises.  I wrote him a prescription for the full dose of Viagra at 100 mg gave him instructions.  I will see him back in 3 months for his next PSA.      Arnel Andrade M.D.

## 2022-07-28 NOTE — PROGRESS NOTES
Office Visit Note  [facility]  [phone]    UROLOGIC DIAGNOSES:   pT3b [place] 4+3 equal 7 prostate cancer  Erectile dysfunction    CURRENT INTERVENTIONS:   Robotic prostatectomy in March  Viagra    HISTORY:   Magdaleno returns to clinic today for prostate cancer follow-up.  His pathology showed Cherri 4+3 equals 7 prostate cancer with multiple areas of extraprostatic extension as well as seminal vesicle invasion.  The margins were negative.  He reports feeling well since his surgery.  He had significant incontinence at first but now he is not leaking at night and is using 3 pads per day.  He has a good urinary stream.  He has been taking Viagra 3 times weekly and has had partial erections.  His first postoperative PSA today is undetectable.      PAST MEDICAL HISTORY:   Past Medical History:   Diagnosis Date     Calculus of kidney     kidney stones     Hypercholesterolemia      Hypertension      Prostate cancer (H)        PAST SURGICAL HISTORY:   Past Surgical History:   Procedure Laterality Date     COLONOSCOPY       COMBINED CYSTOSCOPY, RETROGRADES, URETEROSCOPY, LASER HOLMIUM LITHOTRIPSY URETER(S), INSERT STENT Left 7/28/2016    Procedure: COMBINED CYSTOSCOPY, RETROGRADES, URETEROSCOPY, LASER HOLMIUM LITHOTRIPSY URETER(S), INSERT STENT;  Surgeon: Arnel Andrade MD;  Location:  OR     COMBINED CYSTOSCOPY, RETROGRADES, URETEROSCOPY, LASER HOLMIUM LITHOTRIPSY URETER(S), INSERT STENT Bilateral 9/9/2019    Procedure: Cystoscopy, bilateral retrograde pyelograms, interpretation of fluoroscopic images, bilateral ureteroscopy with holmium laser lithotripsy and stone basketing, placement of 5 x 26 double-J ureteral stents bilaterally;  Surgeon: Arnel Andrade MD;  Location:  OR     CYSTOSCOPY       DAVINCI PROSTATECTOMY N/A 3/31/2022    Procedure: ROBOTIC ASSISTED LAPAROSCOPIC RADICAL PROSTATECTOMY,  BILATERAL PELVIC LYMPHADENECTOMY;  Surgeon: Arnel Andrade MD;  Location:   OR     GENITOURINARY SURGERY  1980's    shock wave lithotripsy     LASER HOLMIUM LITHOTRIPSY URETER(S), INSERT STENT, COMBINED Right 1/21/2022    Procedure: Cystoscopy, right ureteroscopy with holmium laser lithotripsy, right ureteral stent placement,;  Surgeon: Arnel Andrade MD;  Location:  OR     ORTHOPEDIC SURGERY      Torn meniscus 2021       FAMILY HISTORY: History reviewed. No pertinent family history.    SOCIAL HISTORY:   Social History     Socioeconomic History     Marital status:      Spouse name: None     Number of children: None     Years of education: None     Highest education level: None   Tobacco Use     Smoking status: Never Smoker     Smokeless tobacco: Never Used   Substance and Sexual Activity     Alcohol use: Yes     Comment: 12-14 drinks per  week     Drug use: No     Sexual activity: Yes       Review Of Systems:  Skin: No rash, pruritis, or skin pigmentation  Eyes: No changes in vision  Ears/Nose/Throat: No changes in hearing, no nosebleeds  Respiratory: No shortness of breath, dyspnea on exertion, cough, or hemoptysis  Cardiovascular: No chest pain or palpitations  Gastrointestinal: No diarrhea or constipation. No abdominal pain. No hematochezia  Genitourinary: see HPI  Musculoskeletal: No pain or swelling of joints, normal range of motion  Neurologic: No weakness or tremors  Psychiatric: No recent changes in memory or mood  Hematologic/Lymphatic/Immunologic: No easy bruising or enlarged lymph nodes  Endocrine: No weight gain or loss      PHYSICAL EXAM:    /81   Pulse 74   SpO2 98%     Constitutional: Well developed. Conversant and in no acute distress  Eyes: Anicteric sclera, conjunctiva clear, normal extraocular movements  ENT: Normocephalic and atraumatic,   Skin: Warm and dry. No rashes or lesions  Cardiac: No peripheral edema  Back/Flank: Not done  CNS/PNS: Normal musculature and movements, moves all extremities normally  Respiratory: Normal non-labored  breathing  Abdomen: Soft nontender and nondistended  Peripheral Vascular: No peripheral edema  Mental Status/Psych: Alert and Oriented x 3. Normal mood and affect    Penis: Not done  Scrotal Skin: Not done  Testicles: Not done  Epididymis: Not done  Digital Rectal Exam:     Cystoscopy: Not done    Imaging: None    Urinalysis: UA RESULTS:  Recent Labs   Lab Test 11/02/21  1353 05/28/16  1424   COLOR Yellow Light Yellow   APPEARANCE Clear Clear   URINEGLC Negative Negative   URINEBILI Negative Negative   URINEKETONE Negative Negative   SG 1.025 1.003   UBLD Negative Moderate*   URINEPH 7.0 6.5   PROTEIN Negative Negative   UROBILINOGEN 0.2  --    NITRITE Negative Negative   LEUKEST Negative Negative   RBCU  --  26*   WBCU  --  <1       PSA: Undetectable    Post Void Residual:     Other labs: None today      IMPRESSION:  Doing well, PSA undetectable    PLAN:  We discussed the treatment and surveillance plan after his prostatectomy.  He has been doing pelvic floor physical therapy and he will resume Kegel exercises.  I wrote him a prescription for the full dose of Viagra at 100 mg gave him instructions.  I will see him back in 3 months for his next PSA.      Arnel Andrade M.D.

## 2022-08-08 ENCOUNTER — THERAPY VISIT (OUTPATIENT)
Dept: PHYSICAL THERAPY | Facility: CLINIC | Age: 67
End: 2022-08-08
Payer: COMMERCIAL

## 2022-08-08 DIAGNOSIS — N39.46 MIXED INCONTINENCE URGE AND STRESS (MALE)(FEMALE): ICD-10-CM

## 2022-08-08 DIAGNOSIS — C61 PROSTATE CANCER (H): Primary | ICD-10-CM

## 2022-08-08 PROCEDURE — 97112 NEUROMUSCULAR REEDUCATION: CPT | Mod: GP | Performed by: PHYSICAL THERAPIST

## 2022-08-08 PROCEDURE — 97110 THERAPEUTIC EXERCISES: CPT | Mod: GP | Performed by: PHYSICAL THERAPIST

## 2022-08-08 PROCEDURE — 90912 BFB TRAINING 1ST 15 MIN: CPT | Mod: GP | Performed by: PHYSICAL THERAPIST

## 2022-08-08 NOTE — PROGRESS NOTES
[facility]    OUTPATIENT Physical Therapy ORTHOPEDIC EVALUATION  PLAN OF TREATMENT FOR OUTPATIENT REHABILITATION  (COMPLETE FOR INITIAL CLAIMS ONLY)  Patient's Last Name, First Name, M.I.  YOB: 1955  Magdaleno Marie    Provider s Name:  [facility]   Medical Record No.  [id number]   Start of Care Date:  05/03/22   Onset Date:  03/31/22    Type:     _X__PT   ___OT Medical Diagnosis:    Encounter Diagnoses   Name Primary?    Prostate cancer (H) Yes    Mixed incontinence urge and stress (male)(female)         Treatment Diagnosis:  post op incontinence        Goals:     08/08/22 0500   Body Part   Goals listed below are for pelvic   Other Goal   Activity incontinence   Previous Functional Level no incontinence   Current Functional Level pt using 3 Depends per day   STG Target Performance pt using 2 Depends per day   Rationale decreased incontinence   Due Date 09/05/22   LTG Target Performance pt using 1 pad per dy   Rationale decreased incontinence   Due Date 10/31/22       Therapy Frequency:  1x every 6 weeks  Predicted Duration of Therapy Intervention:  12 weeks    Sal Drew, PT                 I CERTIFY THE NEED FOR THESE SERVICES FURNISHED UNDER        THIS PLAN OF TREATMENT AND WHILE UNDER MY CARE     (Physician attestation of this document indicates review and certification of the therapy plan).                     Certification Date From:  08/01/22   Certification Date To:  10/22/22    Referring Provider:  Quita Easton    Initial Assessment        See Epic Evaluation SOC Date: 05/03/22

## 2022-08-08 NOTE — PROGRESS NOTES
Subjective:  HPI  Physical Exam                    Objective:  System    Physical Exam    General     ROS    Assessment/Plan:    PROGRESS  REPORT    Progress reporting period is from 08/08/22.       SUBJECTIVE  Subjective changes noted by patient:   Subjective: Just returned from trip to [place]. OVerall feeling like he is improving. Able to feel some filling/urge now. Using 3 depends/day now vs 5.Still dealing with belly bloating/gas, slight loose stool. MD follow up in November.    Current pain level is NA  .     Previous pain level was   Initial Pain level: 2/10.   Changes in function:  Yes (See Goal flowsheet attached for changes in current functional level)  Adverse reaction to treatment or activity: None    OBJECTIVE  Changes noted in objective findings:    Objective: Peak contraction PF in sitting 15-16mv, supine ~ 13-15mV. Good technique with kegel/breathing. Advanced to sit/stand PF and doing HEP 4x week to fatigue vs daily.     ASSESSMENT/PLAN  Updated problem list and treatment plan: Diagnosis 1:  S/p prostatectomy incontinence  Decreased ROM/flexibility - manual therapy and therapeutic exercise  Decreased strength - therapeutic exercise and therapeutic activities  Decreased proprioception - neuro re-education and therapeutic activities  Inflammation - self management/home program  Impaired gait - gait training  Impaired muscle performance - biofeedback and neuro re-education  Decreased function - therapeutic activities  STG/LTGs have been met or progress has been made towards goals:  Yes (See Goal flow sheet completed today.)  Assessment of Progress: The patient's condition is improving.  Self Management Plans:  Patient has been instructed in a home treatment program.  Patient  has been instructed in self management of symptoms.    Magdaleno continues to require the following intervention to meet STG and LTG's:  PT    Recommendations:  This patient would benefit from continued therapy.      Frequency:  1x every 6 weeks  Duration:  for 12 weeks        Please refer to the daily flowsheet for treatment today, total treatment time and time spent performing 1:1 timed codes.

## 2022-08-17 ENCOUNTER — NURSE TRIAGE (OUTPATIENT)
Dept: NURSING | Facility: CLINIC | Age: 67
End: 2022-08-17

## 2022-08-17 NOTE — TELEPHONE ENCOUNTER
"He is in [place]  Till the 28th . He feels like it is 'raw\" . The bacitracin  is making it worse so I told him to stop using it  Clean off skin and let it get air when he can . He is asking  If there is another cream he can try. I  Informed him I would message  Dr Andrade  But it may be difficult since he  Cannot see  The skin and irritation . Please advise   "

## 2022-08-17 NOTE — TELEPHONE ENCOUNTER
Call from Magdaleno see triage note  Best number to reach him today [phone]  Nurse Triage SBAR    Is this a 2nd Level Triage? YES, LICENSED PRACTITIONER REVIEW IS REQUIRED    Situation:   the past 48 hours having redness, pain 3-4/10, and appears inflamed at end of penis and under foreskin.  No drainage or discharge, no fever, abdominal or flank pain.  Has been using neosporin or bacitracin, getting worse.  Urinary incontinence since surgery, wears pull up.  [place].  [place] [address]    Background:   3/31/22   ROBOTIC ASSISTED LAPAROSCOPIC RADICAL PROSTATECTOMY,  BILATERAL PELVIC LYMPHADENECTOMY   Has had these symptoms occasionally since surgery, neomycin or bacitracin has resolved symptoms.      Assessment:   follow up per urology team today    Protocol Recommended Disposition:   Callback by PCP Today        Reason for Disposition    Caller has NON-URGENT question and triager unable to answer question    Additional Information    Negative: Sounds like a life-threatening emergency to the triager    Negative: Chest pain    Negative: Difficulty breathing    Negative: Acting confused (e.g., disoriented, slurred speech) or excessively sleepy    Negative: Surgical incision symptoms and questions    Negative: Pain or burning with passing urine (urination) and male    Negative: Pain or burning with passing urine (urination) and female    Negative: Constipation    Negative: New or worsening leg (calf, thigh) pain    Negative: New or worsening leg swelling    Negative: Dizziness is severe, or persists > 24 hours after surgery    Negative: Symptoms arising from use of a urinary catheter ([place] or [place])    Negative: Cast problems or questions    Negative: Medication question    Negative: Bright red, wide-spread, sunburn-like rash    Negative: SEVERE headache and after spinal (epidural) anesthesia    Negative: Vomiting and persists > 4 hours    Negative: Vomiting and  "abdomen looks much more swollen than usual    Negative: Drinking very little and dehydration suspected (e.g., no urine > 12 hours, very dry mouth, very lightheaded)    Negative: Patient sounds very sick or weak to the triager    Negative: Sounds like a serious complication to the triager    Negative: Fever > 100.4 F (38.0 C)    Negative: Caller has URGENT question and triager unable to answer question    Negative: Patient wants to be seen    Negative: Fever present > 3 days (72 hours)    Negative: SEVERE post-op pain (e.g., excruciating, pain scale 8-10) that is not controlled with pain medications    Negative: Headache and after spinal (epidural) anesthesia and not severe    Answer Assessment - Initial Assessment Questions  1. SYMPTOM: \"What's the main symptom you're concerned about?\" (e.g., pain, fever, vomiting)      Tip of penis and under foreskin is red, sore and appears inflammed  2. ONSET: \"When did symptom  start?\"      48 hours ago  3. SURGERY: \"What surgery did you have?\"      ROBOTIC ASSISTED LAPAROSCOPIC RADICAL PROSTATECTOMY,  BILATERAL PELVIC LYMPHADENECTOMY   4. DATE of SURGERY: \"When was the surgery?\"       3/31/22  5. ANESTHESIA: \" What type of anesthesia did you have?\" (e.g., general, spinal, epidural, local)      general  6. PAIN: \"Is there any pain?\" If Yes, ask: \"How bad is it?\"  (Scale 1-10; or mild, moderate, severe)      Yes 3-4/10 with urination  7. FEVER: \"Do you have a fever?\" If Yes, ask: \"What is your temperature, how was it measured, and when did it start?\"      denies  8. VOMITING: \"Is there any vomiting?\" If Yes, ask: \"How many times?\"      No nausea/vomiting  9. BLEEDING: \"Is there any bleeding?\" If Yes, ask: \"How much?\" and \"Where?\"      No blood in urine, no clots.  No bleeding from tip of penis  10. OTHER SYMPTOMS: \"Do you have any other symptoms?\" (e.g., drainage from wound, painful urination, constipation)        Has to wear pull ups since surgery due to incontinence.  Has had " this occur occasionally since surgery, would clear with use of neomycin or bacitracin.  This time is not clearing, seems to be getting worse.  Denies abdominal and flank pain.    Protocols used: POST-OP SYMPTOMS AND [id number]-B-KM

## 2022-08-18 NOTE — TELEPHONE ENCOUNTER
Left message to try a florencio cream like Desiitin . Instructed him to stop if it  it makes symptoms worse  And contact us  .

## 2022-08-19 ENCOUNTER — NURSE TRIAGE (OUTPATIENT)
Dept: NURSING | Facility: CLINIC | Age: 67
End: 2022-08-19

## 2022-08-19 NOTE — TELEPHONE ENCOUNTER
Nurse Triage SBAR    Is this a 2nd Level Triage?    Yes    Situation:    Red skin around the tip of the penis and foreskin and some burning at the tip of the penis upon urination.     Background/Assessment:     Pt calling back today after talking to a nurse yesterday and receiving a message to use Desitin for the issues he was having with the foreskin around the penis and burning at the tip of his penis when urinating.     Pt calling back today to report the Barrier cream.  It worked a little bit.   The foreskin area is not as irritated as it was.     But still not feeling that great.    The tip of the penis is still really red and irritated.   It burns at the tip of the penis when he passes urine.     No fever, hot sweats, cold sweats or the chills.  But still having the burning sensation when he urinates and his penis skin color is still reddish/purple in color.     Having some lower abdominal pain in the bladder area.     Pt is in [facility] right now for a week on vacation.      I suggested the Pt go to an Urgent Care close to the location he is staying to be seen in clinic.   Have his urine tested for bacteria and get a prescription medication for the skin irritation for Pt care.         Pt agreed with plan of care and will go to an Urgent Care in his area while on vacation.     No further action needed at this time.    Jayla Campos RN  Central Triage Red Flags/Med Refills      Protocol Recommended Disposition:   See in Office Today, See More Appropriate Protocol    Reason for Disposition    Pain or burning with passing urine (urination) and male    All other males with painful urination, or patient wants to be seen    Additional Information    Negative: Shock suspected (e.g., cold/pale/clammy skin, too weak to stand, low BP, rapid pulse)    Negative: Sounds like a life-threatening emergency to the triager    Negative: Followed a female genital area injury (e.g., vagina, vulva)    Negative:  Followed a male genital area injury (penis, scrotum)    Negative: Vaginal discharge    Negative: Pus (white, yellow) or bloody discharge from end of penis    Negative: Pain or burning with passing urine (urination) and pregnant    Negative: Pain or burning with passing urine (urination) and female    Negative: Shock suspected (e.g., cold/pale/clammy skin, too weak to stand, low BP, rapid pulse)    Negative: Sounds like a life-threatening emergency to the triager    Negative: Unable to urinate (or only a few drops) and bladder feels very full    Negative: Swollen scrotum    Negative: Pain in scrotum or testicle that persists > 1 hour    Negative: Fever > 100.4 F (38.0 C)    Negative: Vomiting    Negative: Patient sounds very sick or weak to the triager    Negative: SEVERE pain with urination    Negative: Side (flank) or lower back pain present    Negative: Taking antibiotic > 24 hours for UTI and fever persists    Negative: Taking antibiotic > 3 days for UTI and painful urination not improved    Negative: Taking treatment > 3 days for STI (e.g., penile discharge from gonorrhea, chlamydia) and painful urination not improved    Protocols used: URINARY SYMPTOMS-A-OH, URINATION PAIN - MALE-A-OH

## 2022-08-25 DIAGNOSIS — N48.1 BALANITIS: Primary | ICD-10-CM

## 2022-08-25 RX ORDER — NYSTATIN 100000 U/G
CREAM TOPICAL 2 TIMES DAILY
Qty: 30 G | Refills: 1 | Status: SHIPPED | OUTPATIENT
Start: 2022-08-25 | End: 2022-09-13

## 2022-09-12 ENCOUNTER — MYC MEDICAL ADVICE (OUTPATIENT)
Dept: UROLOGY | Facility: CLINIC | Age: 67
End: 2022-09-12

## 2022-09-13 ENCOUNTER — OFFICE VISIT (OUTPATIENT)
Dept: UROLOGY | Facility: CLINIC | Age: 67
End: 2022-09-13
Payer: COMMERCIAL

## 2022-09-13 VITALS
HEIGHT: 71 IN | HEART RATE: 67 BPM | DIASTOLIC BLOOD PRESSURE: 91 MMHG | BODY MASS INDEX: 31.08 KG/M2 | SYSTOLIC BLOOD PRESSURE: 152 MMHG | OXYGEN SATURATION: 96 % | WEIGHT: 222 LBS

## 2022-09-13 DIAGNOSIS — N48.1 BALANITIS: ICD-10-CM

## 2022-09-13 PROCEDURE — 99212 OFFICE O/P EST SF 10 MIN: CPT | Performed by: UROLOGY

## 2022-09-13 RX ORDER — NYSTATIN 100000 U/G
CREAM TOPICAL 2 TIMES DAILY
Qty: 30 G | Refills: 1 | Status: SHIPPED | OUTPATIENT
Start: 2022-09-13 | End: 2023-10-26

## 2022-09-13 ASSESSMENT — PAIN SCALES - GENERAL: PAINLEVEL: NO PAIN (0)

## 2022-09-13 NOTE — LETTER
9/13/2022       RE: Magdaleno Marie  [id number]  [place] [address]     Dear Colleague,    Thank you for referring your patient, Magdaleno Marie, to the [facility] at [facility]. Please see a copy of my visit note below.    Urology    Magdaleno returns to clinic today requesting examination for a rash on the groin and intertriginous areas.  He has been applying nystatin cream twice daily and the rash appears to be getting better per his perspective.    I examined him and there is evidence of a rash in the groin and intertriginous regions that appears to be getting better.  At this point there is only a mild amount of erythema remaining on the scrotum.  The foreskin shows no phimosis and no balanitis.    He was provided reassurance.  Nystatin cream refilled for him.  I will see him in November for his next PSA.      Sincerely,    Arnel Andrade MD

## 2022-09-13 NOTE — NURSING NOTE
Chief Complaint   Patient presents with     Follow Up     Pt here today for rash in crouch area   Prabha Calderon CMA

## 2022-09-13 NOTE — PROGRESS NOTES
Urology    Magdaleno returns to clinic today requesting examination for a rash on the groin and intertriginous areas.  He has been applying nystatin cream twice daily and the rash appears to be getting better per his perspective.    I examined him and there is evidence of a rash in the groin and intertriginous regions that appears to be getting better.  At this point there is only a mild amount of erythema remaining on the scrotum.  The foreskin shows no phimosis and no balanitis.    He was provided reassurance.  Nystatin cream refilled for him.  I will see him in November for his next PSA.

## 2022-10-11 ENCOUNTER — TELEPHONE (OUTPATIENT)
Dept: UROLOGY | Facility: CLINIC | Age: 67
End: 2022-10-11

## 2022-10-11 ENCOUNTER — TELEPHONE (OUTPATIENT)
Dept: NEPHROLOGY | Facility: CLINIC | Age: 67
End: 2022-10-11

## 2022-10-11 NOTE — TELEPHONE ENCOUNTER
Provider aware. Will work pt in sooner if we can get sample sent in sooner than around June for when pt was rescheduled for.

## 2022-10-11 NOTE — TELEPHONE ENCOUNTER
Writer did not see a litholink as well.    Writer called and left VM to pt asking if this was completed. Writer calling odalisk as well to ask what occurred.     Writer called and spoke to zina, kit was sent out 7/1/22 but zina never received a sample back.

## 2022-10-11 NOTE — TELEPHONE ENCOUNTER
[facility] Call Center    Phone Message    May a detailed message be left on voicemail: yes     Reason for Call: Pt called and was very addiment I reach out to provider to inform her that he had to reschedule his appt for today due to messing up and not getting samples in. Thank you    Action Taken: Message routed to:  [facility] Neph    Travel Screening: Not Applicable

## 2022-10-11 NOTE — TELEPHONE ENCOUNTER
----- Message from Purnima Santizo LPN sent at 10/11/2022 12:33 PM CDT -----  Regarding: RE: litholink  Hello,    Patient does not have a Litholink on the website.    Thank you!  Purnima Santizo LPN  Urology Clinic Service   [facility]      ----- Message -----  From: Waleska Javier RN  Sent: 10/11/2022  12:11 PM CDT  To: Mayi Redmond MD, Johana Quiros, #  Subject: RE: [email or web address]                                    Purnima can you jump in here and see what you can find for us?  Urgent request, please.    Thank you!!!    Waleska   ----- Message -----  From: Mayi Redmond MD  Sent: 10/11/2022  12:02 PM CDT  To: Waleska Javier RN, Johana Quiros, #  Subject: litholink                                        I do not see a litholink result, can someone please double check? Was supposed to complete in July and I see it was ordered.  Not sure what I will discuss at today's visit without a litholink result.  Thanks  Mayi

## 2022-10-18 NOTE — TELEPHONE ENCOUNTER
Still no litholink resulted. Writer calling pt to ask when this will be complete or when it was completed to watch out for results to then get pt in sooner.     Harleen called and left detailed VM to pt asking if litholink was done (when) or when litholink will be done so that we can move up appt.

## 2022-10-18 NOTE — TELEPHONE ENCOUNTER
The patient called back regarding message below. He says he is submitting LithoLink tomorrow. Please contact patient with any questions. Thank you.

## 2022-10-19 ENCOUNTER — TRANSFERRED RECORDS (OUTPATIENT)
Dept: HEALTH INFORMATION MANAGEMENT | Facility: CLINIC | Age: 67
End: 2022-10-19

## 2022-10-31 NOTE — TELEPHONE ENCOUNTER
Urine pH not at goal on litholink  Please check whether he is taking potassium citrate 20 meq twice a day as prescribed.  Mayi Redmond MD

## 2022-11-01 NOTE — TELEPHONE ENCOUNTER
Pt phoned and left detailed VM requesting return call to review medication compliance as stated in Dr. christopher's note below.    Direct call back left for pt    HANNAH Galeano  Care Coordinator  [phone]

## 2022-11-02 NOTE — TELEPHONE ENCOUNTER
Pt returned call, he is taking potassium citrate 10 mEq. 2 tablets, twice daily. Note routed to provider.

## 2022-11-03 ENCOUNTER — OFFICE VISIT (OUTPATIENT)
Dept: UROLOGY | Facility: CLINIC | Age: 67
End: 2022-11-03
Payer: COMMERCIAL

## 2022-11-03 VITALS
HEIGHT: 71 IN | HEART RATE: 79 BPM | SYSTOLIC BLOOD PRESSURE: 100 MMHG | OXYGEN SATURATION: 99 % | DIASTOLIC BLOOD PRESSURE: 78 MMHG | WEIGHT: 222 LBS | BODY MASS INDEX: 31.08 KG/M2

## 2022-11-03 DIAGNOSIS — C61 PROSTATE CANCER (H): Primary | ICD-10-CM

## 2022-11-03 LAB
ALBUMIN UR-MCNC: NEGATIVE MG/DL
APPEARANCE UR: CLEAR
BILIRUB UR QL STRIP: NEGATIVE
COLOR UR AUTO: YELLOW
GLUCOSE UR STRIP-MCNC: NEGATIVE MG/DL
HGB UR QL STRIP: ABNORMAL
KETONES UR STRIP-MCNC: NEGATIVE MG/DL
LEUKOCYTE ESTERASE UR QL STRIP: NEGATIVE
NITRATE UR QL: NEGATIVE
PH UR STRIP: 5.5 [PH] (ref 5–7)
PSA SERPL-MCNC: <0.04 UG/L (ref 0–4)
SP GR UR STRIP: >=1.03 (ref 1–1.03)
UROBILINOGEN UR STRIP-ACNC: 0.2 E.U./DL

## 2022-11-03 PROCEDURE — 84153 ASSAY OF PSA TOTAL: CPT | Performed by: UROLOGY

## 2022-11-03 PROCEDURE — 36415 COLL VENOUS BLD VENIPUNCTURE: CPT | Performed by: UROLOGY

## 2022-11-03 PROCEDURE — 99213 OFFICE O/P EST LOW 20 MIN: CPT | Performed by: UROLOGY

## 2022-11-03 PROCEDURE — 81003 URINALYSIS AUTO W/O SCOPE: CPT | Mod: QW | Performed by: UROLOGY

## 2022-11-03 ASSESSMENT — PAIN SCALES - GENERAL: PAINLEVEL: MILD PAIN (2)

## 2022-11-03 NOTE — PROGRESS NOTES
Office Visit Note  [facility]  [phone]    UROLOGIC DIAGNOSES:   pT3b [place] 4+3 equal 7 prostate cancer  Erectile dysfunction    CURRENT INTERVENTIONS:   Robotic prostatectomy March 2022  Viagra    HISTORY:   Magdaleno returns clinic today for prostate cancer follow-up.  His PSA today remains undetectable.  He continues to feel well.  His incontinence continues to improve.  He has no leakage at night and he wears 2 pads per day which he only soaked slightly.  He is having successful erections with Viagra.      PAST MEDICAL HISTORY:   Past Medical History:   Diagnosis Date     Calculus of kidney     kidney stones     Hypercholesterolemia      Hypertension      Prostate cancer (H)        PAST SURGICAL HISTORY:   Past Surgical History:   Procedure Laterality Date     COLONOSCOPY       COMBINED CYSTOSCOPY, RETROGRADES, URETEROSCOPY, LASER HOLMIUM LITHOTRIPSY URETER(S), INSERT STENT Left 7/28/2016    Procedure: COMBINED CYSTOSCOPY, RETROGRADES, URETEROSCOPY, LASER HOLMIUM LITHOTRIPSY URETER(S), INSERT STENT;  Surgeon: Arnel Andrade MD;  Location:  OR     COMBINED CYSTOSCOPY, RETROGRADES, URETEROSCOPY, LASER HOLMIUM LITHOTRIPSY URETER(S), INSERT STENT Bilateral 9/9/2019    Procedure: Cystoscopy, bilateral retrograde pyelograms, interpretation of fluoroscopic images, bilateral ureteroscopy with holmium laser lithotripsy and stone basketing, placement of 5 x 26 double-J ureteral stents bilaterally;  Surgeon: Arnel Andrade MD;  Location:  OR     CYSTOSCOPY       DAVINCI PROSTATECTOMY N/A 3/31/2022    Procedure: ROBOTIC ASSISTED LAPAROSCOPIC RADICAL PROSTATECTOMY,  BILATERAL PELVIC LYMPHADENECTOMY;  Surgeon: Arnel Andrade MD;  Location:  OR     GENITOURINARY SURGERY  1980's    shock wave lithotripsy     LASER HOLMIUM LITHOTRIPSY URETER(S), INSERT STENT, COMBINED Right 1/21/2022    Procedure: Cystoscopy, right ureteroscopy with holmium laser lithotripsy, right ureteral  "stent placement,;  Surgeon: Arnel Andrade MD;  Location: [facility] OR     ORTHOPEDIC SURGERY      Torn meniscus 2021       FAMILY HISTORY: History reviewed. No pertinent family history.    SOCIAL HISTORY:   Social History     Socioeconomic History     Marital status:      Spouse name: None     Number of children: None     Years of education: None     Highest education level: None   Tobacco Use     Smoking status: Never     Smokeless tobacco: Never   Substance and Sexual Activity     Alcohol use: Yes     Comment: 12-14 drinks per  week     Drug use: No     Sexual activity: Yes       Review Of Systems:  Skin: No rash, pruritis, or skin pigmentation  Eyes: No changes in vision  Ears/Nose/Throat: No changes in hearing, no nosebleeds  Respiratory: No shortness of breath, dyspnea on exertion, cough, or hemoptysis  Cardiovascular: No chest pain or palpitations  Gastrointestinal: No diarrhea or constipation. No abdominal pain. No hematochezia  Genitourinary: see HPI  Musculoskeletal: No pain or swelling of joints, normal range of motion  Neurologic: No weakness or tremors  Psychiatric: No recent changes in memory or mood  Hematologic/Lymphatic/Immunologic: No easy bruising or enlarged lymph nodes  Endocrine: No weight gain or loss      PHYSICAL EXAM:    /78 (BP Location: Right arm, Patient Position: Sitting, Cuff Size: Adult Regular)   Pulse 79   Ht 1.803 m (5' 11\")   Wt 100.7 kg (222 lb)   SpO2 99%   BMI 30.96 kg/m      Constitutional: Well developed. Conversant and in no acute distress  Eyes: Anicteric sclera, conjunctiva clear, normal extraocular movements  ENT: Normocephalic and atraumatic,   Skin: Warm and dry. No rashes or lesions  Cardiac: No peripheral edema  Back/Flank: Not done  CNS/PNS: Normal musculature and movements, moves all extremities normally  Respiratory: Normal non-labored breathing  Abdomen: Soft nontender and nondistended  Peripheral Vascular: No peripheral edema  Mental " Status/Psych: Alert and Oriented x 3. Normal mood and affect    Penis: Not done  Scrotal Skin: Not done  Testicles: Not done  Epididymis: Not done  Digital Rectal Exam:     Cystoscopy: Not done    Imaging: None    Urinalysis: UA RESULTS:  Recent Labs   Lab Test 11/02/21  1353 05/28/16  1424   COLOR Yellow Light Yellow   APPEARANCE Clear Clear   URINEGLC Negative Negative   URINEBILI Negative Negative   URINEKETONE Negative Negative   SG 1.025 1.003   UBLD Negative Moderate*   URINEPH 7.0 6.5   PROTEIN Negative Negative   UROBILINOGEN 0.2  --    NITRITE Negative Negative   LEUKEST Negative Negative   RBCU  --  26*   WBCU  --  <1       PSA: Undetectable    Post Void Residual:     Other labs: None today      IMPRESSION:  Doing well    PLAN:  He is doing very well.  Prostate cancer standpoint.  I will see him back in 6 months for his next screening cystoscopy.      Arnel Andrade M.D.

## 2022-11-03 NOTE — LETTER
11/3/2022       RE: Magdaleno Marie  [id number]  [place] [address]     Dear Colleague,    Thank you for referring your patient, Magdaleno Marie, to the [facility] at [facility]. Please see a copy of my visit note below.    Office Visit Note  [facility]  [phone]    UROLOGIC DIAGNOSES:   pT3b [place] 4+3 equal 7 prostate cancer  Erectile dysfunction    CURRENT INTERVENTIONS:   Robotic prostatectomy March 2022  Viagra    HISTORY:   Magdaleno returns clinic today for prostate cancer follow-up.  His PSA today remains undetectable.  He continues to feel well.  His incontinence continues to improve.  He has no leakage at night and he wears 2 pads per day which he only soaked slightly.  He is having successful erections with Viagra.      PAST MEDICAL HISTORY:   Past Medical History:   Diagnosis Date     Calculus of kidney     kidney stones     Hypercholesterolemia      Hypertension      Prostate cancer (H)        PAST SURGICAL HISTORY:   Past Surgical History:   Procedure Laterality Date     COLONOSCOPY       COMBINED CYSTOSCOPY, RETROGRADES, URETEROSCOPY, LASER HOLMIUM LITHOTRIPSY URETER(S), INSERT STENT Left 7/28/2016    Procedure: COMBINED CYSTOSCOPY, RETROGRADES, URETEROSCOPY, LASER HOLMIUM LITHOTRIPSY URETER(S), INSERT STENT;  Surgeon: Arnel Andrade MD;  Location: [facility] OR     COMBINED CYSTOSCOPY, RETROGRADES, URETEROSCOPY, LASER HOLMIUM LITHOTRIPSY URETER(S), INSERT STENT Bilateral 9/9/2019    Procedure: Cystoscopy, bilateral retrograde pyelograms, interpretation of fluoroscopic images, bilateral ureteroscopy with holmium laser lithotripsy and stone basketing, placement of 5 x 26 double-J ureteral stents bilaterally;  Surgeon: Arnel Andrade MD;  Location: [facility] OR     CYSTOSCOPY       DAVINCI PROSTATECTOMY N/A 3/31/2022    Procedure: ROBOTIC ASSISTED LAPAROSCOPIC RADICAL PROSTATECTOMY,   "BILATERAL PELVIC LYMPHADENECTOMY;  Surgeon: Arnel Andrade MD;  Location:  OR     GENITOURINARY SURGERY  1980's    shock wave lithotripsy     LASER HOLMIUM LITHOTRIPSY URETER(S), INSERT STENT, COMBINED Right 1/21/2022    Procedure: Cystoscopy, right ureteroscopy with holmium laser lithotripsy, right ureteral stent placement,;  Surgeon: Arnel Andrade MD;  Location:  OR     ORTHOPEDIC SURGERY      Torn meniscus 2021       FAMILY HISTORY: History reviewed. No pertinent family history.    SOCIAL HISTORY:   Social History     Socioeconomic History     Marital status:      Spouse name: None     Number of children: None     Years of education: None     Highest education level: None   Tobacco Use     Smoking status: Never     Smokeless tobacco: Never   Substance and Sexual Activity     Alcohol use: Yes     Comment: 12-14 drinks per  week     Drug use: No     Sexual activity: Yes       Review Of Systems:  Skin: No rash, pruritis, or skin pigmentation  Eyes: No changes in vision  Ears/Nose/Throat: No changes in hearing, no nosebleeds  Respiratory: No shortness of breath, dyspnea on exertion, cough, or hemoptysis  Cardiovascular: No chest pain or palpitations  Gastrointestinal: No diarrhea or constipation. No abdominal pain. No hematochezia  Genitourinary: see HPI  Musculoskeletal: No pain or swelling of joints, normal range of motion  Neurologic: No weakness or tremors  Psychiatric: No recent changes in memory or mood  Hematologic/Lymphatic/Immunologic: No easy bruising or enlarged lymph nodes  Endocrine: No weight gain or loss      PHYSICAL EXAM:    /78 (BP Location: Right arm, Patient Position: Sitting, Cuff Size: Adult Regular)   Pulse 79   Ht 1.803 m (5' 11\")   Wt 100.7 kg (222 lb)   SpO2 99%   BMI 30.96 kg/m      Constitutional: Well developed. Conversant and in no acute distress  Eyes: Anicteric sclera, conjunctiva clear, normal extraocular movements  ENT: Normocephalic and " atraumatic,   Skin: Warm and dry. No rashes or lesions  Cardiac: No peripheral edema  Back/Flank: Not done  CNS/PNS: Normal musculature and movements, moves all extremities normally  Respiratory: Normal non-labored breathing  Abdomen: Soft nontender and nondistended  Peripheral Vascular: No peripheral edema  Mental Status/Psych: Alert and Oriented x 3. Normal mood and affect    Penis: Not done  Scrotal Skin: Not done  Testicles: Not done  Epididymis: Not done  Digital Rectal Exam:     Cystoscopy: Not done    Imaging: None    Urinalysis: UA RESULTS:  Recent Labs   Lab Test 11/02/21  1353 05/28/16  1424   COLOR Yellow Light Yellow   APPEARANCE Clear Clear   URINEGLC Negative Negative   URINEBILI Negative Negative   URINEKETONE Negative Negative   SG 1.025 1.003   UBLD Negative Moderate*   URINEPH 7.0 6.5   PROTEIN Negative Negative   UROBILINOGEN 0.2  --    NITRITE Negative Negative   LEUKEST Negative Negative   RBCU  --  26*   WBCU  --  <1       PSA: Undetectable    Post Void Residual:     Other labs: None today      IMPRESSION:  Doing well    PLAN:  He is doing very well.  Prostate cancer standpoint.  I will see him back in 6 months for his next screening cystoscopy.      Arnel Andrade M.D.

## 2022-11-06 ENCOUNTER — MYC MEDICAL ADVICE (OUTPATIENT)
Dept: NEPHROLOGY | Facility: CLINIC | Age: 67
End: 2022-11-06

## 2022-11-06 DIAGNOSIS — N20.0 RECURRENT KIDNEY STONES: ICD-10-CM

## 2022-11-06 DIAGNOSIS — I10 HYPERTENSION, ESSENTIAL: ICD-10-CM

## 2022-11-06 DIAGNOSIS — N20.0 KIDNEY STONE: Primary | ICD-10-CM

## 2022-11-11 RX ORDER — POTASSIUM CITRATE 10 MEQ/1
20 TABLET, EXTENDED RELEASE ORAL
Qty: 270 TABLET | Refills: 3 | Status: SHIPPED | OUTPATIENT
Start: 2022-11-11 | End: 2023-10-26

## 2022-11-19 ENCOUNTER — HEALTH MAINTENANCE LETTER (OUTPATIENT)
Age: 67
End: 2022-11-19

## 2022-11-28 ENCOUNTER — THERAPY VISIT (OUTPATIENT)
Dept: PHYSICAL THERAPY | Facility: CLINIC | Age: 67
End: 2022-11-28
Payer: COMMERCIAL

## 2022-11-28 DIAGNOSIS — C61 PROSTATE CANCER (H): Primary | ICD-10-CM

## 2022-11-28 DIAGNOSIS — N39.46 MIXED INCONTINENCE URGE AND STRESS (MALE)(FEMALE): ICD-10-CM

## 2022-11-28 PROCEDURE — 97110 THERAPEUTIC EXERCISES: CPT | Mod: GP | Performed by: PHYSICAL THERAPIST

## 2022-11-28 PROCEDURE — 97535 SELF CARE MNGMENT TRAINING: CPT | Mod: GP | Performed by: PHYSICAL THERAPIST

## 2022-11-28 PROCEDURE — 90912 BFB TRAINING 1ST 15 MIN: CPT | Mod: GP | Performed by: PHYSICAL THERAPIST

## 2022-11-28 NOTE — PROGRESS NOTES
Subjective:  HPI  Physical Exam                    Objective:  System    Physical Exam    General     ROS    Assessment/Plan:    PROGRESS  REPORT    Progress reporting period is from 08/08/22 to 11/28/22.       SUBJECTIVE  Subjective changes noted by patient:.  Subjective: Overall definitely improving. Did deal with a rash from the pads and UTI so finally now feeling better. Using 2-3 pads/day. Digestion/gassy/incomplete stool yet.Getting to gym 4-5x week.Feels stronger with kegel but still some slight leakage with moving around more.Working on postponing bathroom longer but 2 hours pushing it. Overall ~80% better.    Current pain level is NA  .     Previous pain level was   Initial Pain level: 2/10.   Changes in function:  Yes (See Goal flowsheet attached for changes in current functional level)  Adverse reaction to treatment or activity: None    OBJECTIVE  Changes noted in objective findings:  Yes,   Objective: Discussed talking to MD in regards to gut issues.BFB kegel strength average ~25-26mv in supine. Rev'd HEP to continue 3-4 x week.     ASSESSMENT/PLAN  Updated problem list and treatment plan: Diagnosis 1:  S/p prostatectomy incontinence  Impaired balance - neuro re-education and therapeutic activities  Decreased proprioception - neuro re-education and therapeutic activities  Impaired muscle performance - biofeedback and neuro re-education  Decreased function - therapeutic activities  STG/LTGs have been met or progress has been made towards goals:  Yes (See Goal flow sheet completed today.)  Assessment of Progress: The patient's condition is improving.  Self Management Plans:  Patient has been instructed in a home treatment program.  Patient  has been instructed in self management of symptoms.    Magdaleno continues to require the following intervention to meet STG and LTG's:  PT    Recommendations:  This patient would benefit from continued therapy.     Frequency:  1 visit every 6 weeks   Duration:  for 3  months        Please refer to the daily flowsheet for treatment today, total treatment time and time spent performing 1:1 timed codes.

## 2022-11-28 NOTE — PROGRESS NOTES
[facility]    OUTPATIENT Physical Therapy ORTHOPEDIC EVALUATION  PLAN OF TREATMENT FOR OUTPATIENT REHABILITATION  (COMPLETE FOR INITIAL CLAIMS ONLY)  Patient's Last Name, First Name, M.I.  YOB: 1955  Magdaleno Marie    Provider s Name:  [facility]   Medical Record No.  [id number]   Start of Care Date:  05/03/22   Onset Date:  03/31/2204/11/22 (date of order)   Treatment Diagnosis:  post op incontinence Medical Diagnosis:     Prostate cancer (H)  Mixed incontinence urge and stress (male)(female)       Goals:     11/28/22 0500   Body Part   Goals listed below are for pelvic   Other Goal   Activity incontinence   Previous Functional Level no incontinence   Current Functional Level pt using 2 Depends per day   STG Target Performance pt using 2 Depends per day   Rationale decreased incontinence   Due Date 09/05/22   Date Goal Met 11/28/22   LTG Target Performance pt using 1 pad per dy   Rationale decreased incontinence   Due Date 01/23/23       Therapy Frequency:  1x every 6 weeks  Predicted Duration of Therapy Intervention:  12 weeks    Sal Drew, PT                 I CERTIFY THE NEED FOR THESE SERVICES FURNISHED UNDER        THIS PLAN OF TREATMENT AND WHILE UNDER MY CARE     (Physician attestation of this document indicates review and certification of the therapy plan).                     Certification Date From:  10/23/22   Certification Date To:  01/23/23    Referring Provider:  Quita Easton    Initial Assessment        See Epic Evaluation SOC Date: 05/03/22

## 2022-12-06 ENCOUNTER — TELEPHONE (OUTPATIENT)
Dept: UROLOGY | Facility: CLINIC | Age: 67
End: 2022-12-06

## 2022-12-06 NOTE — TELEPHONE ENCOUNTER
I advised patient that this isn't related to his prostate surgery and he should contact his PMD.  Kiki Santillan LPN

## 2022-12-06 NOTE — TELEPHONE ENCOUNTER
[facility] Call Center    Phone Message    May a detailed message be left on voicemail: yes     Reason for Call: Symptoms or Concerns     If patient has red-flag symptoms, warm transfer to triage line    Current symptom or concern: struggling with feeling bloated, stomach is bloated, slight lump near mid-drift, not painful just feeling discomfort and having a normal digestive movement - mentioned it last time he was in to see the Dr.    Symptoms have been present for: Prostate removed back in March - stomach has never really settled down - it is more pronounced in the past 2 weeks.    Has patient previously been seen for this? Yes - had brought it up to Dr. Andrade in last appointment - but is now wanting more intervention on this    Are there any new or worsening symptoms? Yes: no pain - but very bloated.  Alternate between constipation and loose stools.    Pt would like to have someone go over the symptoms with him or refer him to someone else for this.      Action Taken: Message routed to:  Other: Uro    Travel Screening: Not Applicable

## 2022-12-18 ENCOUNTER — HEALTH MAINTENANCE LETTER (OUTPATIENT)
Age: 67
End: 2022-12-18

## 2023-02-06 PROBLEM — C61 PROSTATE CANCER (H): Status: ACTIVE | Noted: 2022-03-31

## 2023-03-08 ENCOUNTER — PATIENT OUTREACH (OUTPATIENT)
Dept: UROLOGY | Facility: CLINIC | Age: 68
End: 2023-03-08
Payer: COMMERCIAL

## 2023-03-08 NOTE — TELEPHONE ENCOUNTER
Pt phoned on behalf of Dr. Redmond to see how he is tolerating his potassium citrate at its increased dose of three times daily. Pt is requested to return call direct for review.     HANNAH Galeano  Care Coordinator  [phone]

## 2023-04-27 ENCOUNTER — OFFICE VISIT (OUTPATIENT)
Dept: UROLOGY | Facility: CLINIC | Age: 68
End: 2023-04-27
Payer: COMMERCIAL

## 2023-04-27 VITALS
BODY MASS INDEX: 31.22 KG/M2 | DIASTOLIC BLOOD PRESSURE: 84 MMHG | WEIGHT: 223 LBS | HEIGHT: 71 IN | SYSTOLIC BLOOD PRESSURE: 126 MMHG

## 2023-04-27 DIAGNOSIS — N20.0 NEPHROLITHIASIS: ICD-10-CM

## 2023-04-27 DIAGNOSIS — C61 PROSTATE CANCER (H): Primary | ICD-10-CM

## 2023-04-27 DIAGNOSIS — R30.9 PAIN WITH URINATION: ICD-10-CM

## 2023-04-27 DIAGNOSIS — N47.1 PHIMOSIS: ICD-10-CM

## 2023-04-27 LAB
ALBUMIN UR-MCNC: NEGATIVE MG/DL
APPEARANCE UR: CLEAR
BILIRUB UR QL STRIP: NEGATIVE
COLOR UR AUTO: YELLOW
GLUCOSE UR STRIP-MCNC: NEGATIVE MG/DL
HGB UR QL STRIP: NEGATIVE
KETONES UR STRIP-MCNC: NEGATIVE MG/DL
LEUKOCYTE ESTERASE UR QL STRIP: ABNORMAL
NITRATE UR QL: NEGATIVE
PH UR STRIP: 5.5 [PH] (ref 5–7)
PSA SERPL-MCNC: <0.04 UG/L (ref 0–4)
SP GR UR STRIP: 1.01 (ref 1–1.03)
UROBILINOGEN UR STRIP-ACNC: 0.2 E.U./DL

## 2023-04-27 PROCEDURE — 81003 URINALYSIS AUTO W/O SCOPE: CPT | Performed by: UROLOGY

## 2023-04-27 PROCEDURE — 84153 ASSAY OF PSA TOTAL: CPT | Performed by: UROLOGY

## 2023-04-27 PROCEDURE — 99214 OFFICE O/P EST MOD 30 MIN: CPT | Performed by: UROLOGY

## 2023-04-27 PROCEDURE — 36415 COLL VENOUS BLD VENIPUNCTURE: CPT | Performed by: UROLOGY

## 2023-04-27 RX ORDER — BETAMETHASONE DIPROPIONATE 0.5 MG/G
CREAM TOPICAL 2 TIMES DAILY
Qty: 50 G | Refills: 4 | Status: SHIPPED | OUTPATIENT
Start: 2023-04-27 | End: 2023-10-26

## 2023-04-27 RX ORDER — OMEGA-3 FATTY ACIDS/FISH OIL 300-1000MG
400 CAPSULE ORAL 2 TIMES DAILY
COMMUNITY
End: 2023-10-26

## 2023-04-27 RX ORDER — GABAPENTIN 300 MG/1
1 CAPSULE ORAL DAILY
COMMUNITY
Start: 2023-04-24 | End: 2023-10-26

## 2023-04-27 RX ORDER — ACETAMINOPHEN 325 MG/1
650 TABLET ORAL 2 TIMES DAILY
COMMUNITY

## 2023-04-27 ASSESSMENT — PAIN SCALES - GENERAL: PAINLEVEL: MILD PAIN (2)

## 2023-04-27 NOTE — NURSING NOTE
Chief Complaint   Patient presents with     Prostate Cancer     6 months with PSA     Pt states he has blister on the tip of the penis, this is causing pain with urination    Eliz Burciaga, CMA

## 2023-04-27 NOTE — PROGRESS NOTES
Office Visit Note  [facility]  [phone]    UROLOGIC DIAGNOSES:   pT3b [place] 4+3 = 7 prostate cancer  Erectile dysfunction  History of stones    CURRENT INTERVENTIONS:   Robotic prostatectomy 2022  Viagra  Potassium citrate    HISTORY:   Magdaleno returns to clinic today for urologic follow-up.  He reports doing well from a prostate cancer standpoint.  He has minimal urinary leakage in the evenings and wears 1 pad per day.  He has good erections with the Viagra.  His PSA today remains undetectable    He says he is only able to tolerate taking the potassium citrate about once daily.  He otherwise has had no symptoms consistent with kidney stones.    He also comes in today with a new issue: Phimosis.  He has had difficulty retracting the foreskin.  He is still able to retract the foreskin, but with difficulty.  He has had some pain with retraction of the foreskin.      PAST MEDICAL HISTORY:   Past Medical History:   Diagnosis Date     Calculus of kidney     kidney stones     Hypercholesterolemia      Hypertension      Prostate cancer (H)        PAST SURGICAL HISTORY:   Past Surgical History:   Procedure Laterality Date     COLONOSCOPY       COMBINED CYSTOSCOPY, RETROGRADES, URETEROSCOPY, LASER HOLMIUM LITHOTRIPSY URETER(S), INSERT STENT Left 7/28/2016    Procedure: COMBINED CYSTOSCOPY, RETROGRADES, URETEROSCOPY, LASER HOLMIUM LITHOTRIPSY URETER(S), INSERT STENT;  Surgeon: Arnel Andrade MD;  Location:  OR     COMBINED CYSTOSCOPY, RETROGRADES, URETEROSCOPY, LASER HOLMIUM LITHOTRIPSY URETER(S), INSERT STENT Bilateral 9/9/2019    Procedure: Cystoscopy, bilateral retrograde pyelograms, interpretation of fluoroscopic images, bilateral ureteroscopy with holmium laser lithotripsy and stone basketing, placement of 5 x 26 double-J ureteral stents bilaterally;  Surgeon: Arnel Andrade MD;  Location:  OR     CYSTOSCOPY       DAVINCI PROSTATECTOMY N/A 3/31/2022    Procedure: ROBOTIC  ASSISTED LAPAROSCOPIC RADICAL PROSTATECTOMY,  BILATERAL PELVIC LYMPHADENECTOMY;  Surgeon: Arnel Andrade MD;  Location:  OR     GENITOURINARY SURGERY  1980's    shock wave lithotripsy     LASER HOLMIUM LITHOTRIPSY URETER(S), INSERT STENT, COMBINED Right 1/21/2022    Procedure: Cystoscopy, right ureteroscopy with holmium laser lithotripsy, right ureteral stent placement,;  Surgeon: Arnel Andrade MD;  Location:  OR     ORTHOPEDIC SURGERY      Torn meniscus 2021       FAMILY HISTORY: No family history on file.    SOCIAL HISTORY:   Social History     Socioeconomic History     Marital status:    Tobacco Use     Smoking status: Never     Smokeless tobacco: Never   Substance and Sexual Activity     Alcohol use: Yes     Comment: 12-14 drinks per  week     Drug use: No     Sexual activity: Yes       Review Of Systems:  Skin: No rash, pruritis, or skin pigmentation  Eyes: No changes in vision  Ears/Nose/Throat: No changes in hearing, no nosebleeds  Respiratory: No shortness of breath, dyspnea on exertion, cough, or hemoptysis  Cardiovascular: No chest pain or palpitations  Gastrointestinal: No diarrhea or constipation. No abdominal pain. No hematochezia  Genitourinary: see HPI  Musculoskeletal: No pain or swelling of joints, normal range of motion  Neurologic: No weakness or tremors  Psychiatric: No recent changes in memory or mood  Hematologic/Lymphatic/Immunologic: No easy bruising or enlarged lymph nodes  Endocrine: No weight gain or loss      PHYSICAL EXAM:    There were no vitals taken for this visit.    Constitutional: Well developed. Conversant and in no acute distress  Eyes: Anicteric sclera, conjunctiva clear, normal extraocular movements  ENT: Normocephalic and atraumatic,   Skin: Warm and dry. No rashes or lesions  Cardiac: No peripheral edema  Back/Flank: Not done  CNS/PNS: Normal musculature and movements, moves all extremities normally  Respiratory: Normal non-labored  breathing  Abdomen: Soft nontender and nondistended  Peripheral Vascular: No peripheral edema  Mental Status/Psych: Alert and Oriented x 3. Normal mood and affect    Penis: He is uncircumcised.  He has a Phimotic ring of foreskin.  I am able to pull back the foreskin and revealed the glans penis, but only with difficulty and it causes pain for the patient.  Scrotal skin: Normal, no lesions  Testicles: Normal to palpation bilaterally  Epididymis: Normal to palpation bilaterally  Lymphatic: Normal inguinal lymph nodes  Digital Rectal Exam: Undetectable    Cystoscopy: Not done    Imaging: None    Urinalysis: UA RESULTS:  Recent Labs   Lab Test 11/03/22  0937 11/02/21  1353 05/28/16  1424   COLOR Yellow   < > Light Yellow   APPEARANCE Clear   < > Clear   URINEGLC Negative   < > Negative   URINEBILI Negative   < > Negative   URINEKETONE Negative   < > Negative   SG >=1.030   < > 1.003   UBLD Trace*   < > Moderate*   URINEPH 5.5   < > 6.5   PROTEIN Negative   < > Negative   UROBILINOGEN 0.2   < >  --    NITRITE Negative   < > Negative   LEUKEST Negative   < > Negative   RBCU  --   --  26*   WBCU  --   --  <1    < > = values in this interval not displayed.       PSA: Undetectable    Post Void Residual:     Other labs: None today      IMPRESSION:  PSA undetectable  History of stones  Phimosis    PLAN:  He is doing well from a prostate cancer standpoint.  He will continue the Viagra.  He did have T3b prostate cancer so I recommended that we check another PSA 6 months from now and then 12 months for now as well.  He agrees to this plan.    Regarding his kidney stones, if he is not able to take enough potassium citrate I recommended that he stressed hydration and add lemon juice to his water.  He also has follow-up with Dr. Redmond this summer.  I recommended to Magdaleno that when I see him back in 6 months we will check another noncontrast CT scan at that time.    We discussed his phimosis as well.  He has difficulty  retracting the foreskin at this time but is still able to retract the foreskin.  Based on exam, I counseled him that he may ultimately require a circumcision if the phimotic ring does not improve.  I prescribed him Diprolene cream to place on the foreskin twice daily.  I also stressed to him the importance of pulling back the foreskin at least once per day and keeping the foreskin and glans penis clean.  If he has continued worsening issues with the foreskin, I instructed him to contact me to discuss circumcision again.  We did discuss this surgery today as well.  Otherwise, I plan on seeing him again in 6 months.      Arnel Andrade M.D.

## 2023-04-27 NOTE — LETTER
4/27/2023       RE: Magdaleno Marie  [id number]  [place] [address]     Dear Colleague,    Thank you for referring your patient, Magdaleno Marie, to the [facility] at [facility]. Please see a copy of my visit note below.    Office Visit Note  [facility]  [phone]    UROLOGIC DIAGNOSES:   pT3b [place] 4+3 = 7 prostate cancer  Erectile dysfunction  History of stones    CURRENT INTERVENTIONS:   Robotic prostatectomy 2022  Viagra  Potassium citrate    HISTORY:   Magdaleno returns to clinic today for urologic follow-up.  He reports doing well from a prostate cancer standpoint.  He has minimal urinary leakage in the evenings and wears 1 pad per day.  He has good erections with the Viagra.  His PSA today remains undetectable    He says he is only able to tolerate taking the potassium citrate about once daily.  He otherwise has had no symptoms consistent with kidney stones.    He also comes in today with a new issue: Phimosis.  He has had difficulty retracting the foreskin.  He is still able to retract the foreskin, but with difficulty.  He has had some pain with retraction of the foreskin.      PAST MEDICAL HISTORY:   Past Medical History:   Diagnosis Date    Calculus of kidney     kidney stones    Hypercholesterolemia     Hypertension     Prostate cancer (H)        PAST SURGICAL HISTORY:   Past Surgical History:   Procedure Laterality Date    COLONOSCOPY      COMBINED CYSTOSCOPY, RETROGRADES, URETEROSCOPY, LASER HOLMIUM LITHOTRIPSY URETER(S), INSERT STENT Left 7/28/2016    Procedure: COMBINED CYSTOSCOPY, RETROGRADES, URETEROSCOPY, LASER HOLMIUM LITHOTRIPSY URETER(S), INSERT STENT;  Surgeon: Arenl Andrade MD;  Location:  OR    COMBINED CYSTOSCOPY, RETROGRADES, URETEROSCOPY, LASER HOLMIUM LITHOTRIPSY URETER(S), INSERT STENT Bilateral 9/9/2019    Procedure: Cystoscopy, bilateral retrograde  pyelograms, interpretation of fluoroscopic images, bilateral ureteroscopy with holmium laser lithotripsy and stone basketing, placement of 5 x 26 double-J ureteral stents bilaterally;  Surgeon: Arnel Andrade MD;  Location:  OR    CYSTOSCOPY      DAVINCI PROSTATECTOMY N/A 3/31/2022    Procedure: ROBOTIC ASSISTED LAPAROSCOPIC RADICAL PROSTATECTOMY,  BILATERAL PELVIC LYMPHADENECTOMY;  Surgeon: Arnel Andrade MD;  Location:  OR    GENITOURINARY SURGERY  1980's    shock wave lithotripsy    LASER HOLMIUM LITHOTRIPSY URETER(S), INSERT STENT, COMBINED Right 1/21/2022    Procedure: Cystoscopy, right ureteroscopy with holmium laser lithotripsy, right ureteral stent placement,;  Surgeon: Arnel Andrade MD;  Location:  OR    ORTHOPEDIC SURGERY      Torn meniscus 2021       FAMILY HISTORY: No family history on file.    SOCIAL HISTORY:   Social History     Socioeconomic History    Marital status:    Tobacco Use    Smoking status: Never    Smokeless tobacco: Never   Substance and Sexual Activity    Alcohol use: Yes     Comment: 12-14 drinks per  week    Drug use: No    Sexual activity: Yes       Review Of Systems:  Skin: No rash, pruritis, or skin pigmentation  Eyes: No changes in vision  Ears/Nose/Throat: No changes in hearing, no nosebleeds  Respiratory: No shortness of breath, dyspnea on exertion, cough, or hemoptysis  Cardiovascular: No chest pain or palpitations  Gastrointestinal: No diarrhea or constipation. No abdominal pain. No hematochezia  Genitourinary: see HPI  Musculoskeletal: No pain or swelling of joints, normal range of motion  Neurologic: No weakness or tremors  Psychiatric: No recent changes in memory or mood  Hematologic/Lymphatic/Immunologic: No easy bruising or enlarged lymph nodes  Endocrine: No weight gain or loss      PHYSICAL EXAM:    There were no vitals taken for this visit.    Constitutional: Well developed. Conversant and in no acute distress  Eyes: Anicteric  sclera, conjunctiva clear, normal extraocular movements  ENT: Normocephalic and atraumatic,   Skin: Warm and dry. No rashes or lesions  Cardiac: No peripheral edema  Back/Flank: Not done  CNS/PNS: Normal musculature and movements, moves all extremities normally  Respiratory: Normal non-labored breathing  Abdomen: Soft nontender and nondistended  Peripheral Vascular: No peripheral edema  Mental Status/Psych: Alert and Oriented x 3. Normal mood and affect    Penis: He is uncircumcised.  He has a Phimotic ring of foreskin.  I am able to pull back the foreskin and revealed the glans penis, but only with difficulty and it causes pain for the patient.  Scrotal skin: Normal, no lesions  Testicles: Normal to palpation bilaterally  Epididymis: Normal to palpation bilaterally  Lymphatic: Normal inguinal lymph nodes  Digital Rectal Exam: Undetectable    Cystoscopy: Not done    Imaging: None    Urinalysis: UA RESULTS:  Recent Labs   Lab Test 11/03/22  0937 11/02/21  1353 05/28/16  1424   COLOR Yellow   < > Light Yellow   APPEARANCE Clear   < > Clear   URINEGLC Negative   < > Negative   URINEBILI Negative   < > Negative   URINEKETONE Negative   < > Negative   SG >=1.030   < > 1.003   UBLD Trace*   < > Moderate*   URINEPH 5.5   < > 6.5   PROTEIN Negative   < > Negative   UROBILINOGEN 0.2   < >  --    NITRITE Negative   < > Negative   LEUKEST Negative   < > Negative   RBCU  --   --  26*   WBCU  --   --  <1    < > = values in this interval not displayed.       PSA: Undetectable    Post Void Residual:     Other labs: None today      IMPRESSION:  PSA undetectable  History of stones  Phimosis    PLAN:  He is doing well from a prostate cancer standpoint.  He will continue the Viagra.  He did have T3b prostate cancer so I recommended that we check another PSA 6 months from now and then 12 months for now as well.  He agrees to this plan.    Regarding his kidney stones, if he is not able to take enough potassium citrate I recommended that  he stressed hydration and add lemon juice to his water.  He also has follow-up with Dr. Redmond this summer.  I recommended to Magdaleno that when I see him back in 6 months we will check another noncontrast CT scan at that time.    We discussed his phimosis as well.  He has difficulty retracting the foreskin at this time but is still able to retract the foreskin.  Based on exam, I counseled him that he may ultimately require a circumcision if the phimotic ring does not improve.  I prescribed him Diprolene cream to place on the foreskin twice daily.  I also stressed to him the importance of pulling back the foreskin at least once per day and keeping the foreskin and glans penis clean.  If he has continued worsening issues with the foreskin, I instructed him to contact me to discuss circumcision again.  We did discuss this surgery today as well.  Otherwise, I plan on seeing him again in 6 months.      Arnel Andrade M.D.

## 2023-04-28 ENCOUNTER — TELEPHONE (OUTPATIENT)
Dept: UROLOGY | Facility: CLINIC | Age: 68
End: 2023-04-28
Payer: COMMERCIAL

## 2023-04-28 NOTE — TELEPHONE ENCOUNTER
Patient called nurse line and LM. He has been using cream on penis and it doesn't seem to be helping. Patient would like a call after 9:00am today.     Judy Barrow LPN

## 2023-05-01 NOTE — TELEPHONE ENCOUNTER
"Per MD- \"I counseled his yesterday that it will take 1-6 MONTHS for the cream to show signs of improvement   thanks \"    Called patient on Friday and informed.     Judy Barrow LPN    "

## 2023-07-17 ENCOUNTER — PATIENT OUTREACH (OUTPATIENT)
Dept: ONCOLOGY | Facility: CLINIC | Age: 68
End: 2023-07-17
Payer: COMMERCIAL

## 2023-07-17 NOTE — PROGRESS NOTES
[facility]: Cancer Care                                                                                            Called Magdaleno today to review the cancer treatment summary that was prepared and sent via [employer]. Magdaleno states he did receive and review the information. We discussed the intent and purpose of the document. He has no questions or survivorship needs at this time but appreciated the information and phone call.     Mary Jane Barth, RN, BSN, OCN  Cancer Survivorship Nurse Coordinator

## 2023-10-23 ENCOUNTER — ANCILLARY PROCEDURE (OUTPATIENT)
Dept: CT IMAGING | Facility: CLINIC | Age: 68
End: 2023-10-23
Attending: UROLOGY
Payer: COMMERCIAL

## 2023-10-23 DIAGNOSIS — N20.0 NEPHROLITHIASIS: ICD-10-CM

## 2023-10-23 PROCEDURE — 74176 CT ABD & PELVIS W/O CONTRAST: CPT

## 2023-10-26 ENCOUNTER — OFFICE VISIT (OUTPATIENT)
Dept: UROLOGY | Facility: CLINIC | Age: 68
End: 2023-10-26
Payer: COMMERCIAL

## 2023-10-26 VITALS
BODY MASS INDEX: 31.64 KG/M2 | HEART RATE: 80 BPM | SYSTOLIC BLOOD PRESSURE: 122 MMHG | HEIGHT: 71 IN | DIASTOLIC BLOOD PRESSURE: 64 MMHG | WEIGHT: 226 LBS

## 2023-10-26 DIAGNOSIS — C61 PROSTATE CANCER (H): Primary | ICD-10-CM

## 2023-10-26 LAB — PSA SERPL-MCNC: <0.04 UG/L (ref 0–4)

## 2023-10-26 PROCEDURE — 36415 COLL VENOUS BLD VENIPUNCTURE: CPT | Performed by: UROLOGY

## 2023-10-26 PROCEDURE — 84153 ASSAY OF PSA TOTAL: CPT | Performed by: UROLOGY

## 2023-10-26 PROCEDURE — 99213 OFFICE O/P EST LOW 20 MIN: CPT | Performed by: UROLOGY

## 2023-10-26 RX ORDER — LISINOPRIL 20 MG/1
20 TABLET ORAL
COMMUNITY
Start: 2023-10-08

## 2023-10-26 NOTE — PROGRESS NOTES
Office Visit Note  [facility]  [phone]    UROLOGIC DIAGNOSES:   pT3b [place] 4+3 = 7 prostate cancer  Erectile dysfunction  History of stones  Mild phimosis    CURRENT INTERVENTIONS:   Robotic prostatectomy 2022  Viagra  Potassium citrate  Diprolene ointment    HISTORY:   Magdaleno returns to clinic today for follow-up on both prostate cancer and kidney stones.  He had a recent CT scan performed that showed no kidney stones on either side.  His PSA today remains undetectable.  He has very rare urinary leakage.  He only wears pads when he goes out for social events.      PAST MEDICAL HISTORY:   Past Medical History:   Diagnosis Date    Calculus of kidney     kidney stones    Hypercholesterolemia     Hypertension     Prostate cancer (H)        PAST SURGICAL HISTORY:   Past Surgical History:   Procedure Laterality Date    COLONOSCOPY      COMBINED CYSTOSCOPY, RETROGRADES, URETEROSCOPY, LASER HOLMIUM LITHOTRIPSY URETER(S), INSERT STENT Left 7/28/2016    Procedure: COMBINED CYSTOSCOPY, RETROGRADES, URETEROSCOPY, LASER HOLMIUM LITHOTRIPSY URETER(S), INSERT STENT;  Surgeon: Arnel Andrade MD;  Location:  OR    COMBINED CYSTOSCOPY, RETROGRADES, URETEROSCOPY, LASER HOLMIUM LITHOTRIPSY URETER(S), INSERT STENT Bilateral 9/9/2019    Procedure: Cystoscopy, bilateral retrograde pyelograms, interpretation of fluoroscopic images, bilateral ureteroscopy with holmium laser lithotripsy and stone basketing, placement of 5 x 26 double-J ureteral stents bilaterally;  Surgeon: Arnel Andrade MD;  Location:  OR    CYSTOSCOPY      DAVINCI PROSTATECTOMY N/A 3/31/2022    Procedure: ROBOTIC ASSISTED LAPAROSCOPIC RADICAL PROSTATECTOMY,  BILATERAL PELVIC LYMPHADENECTOMY;  Surgeon: Arnel Andrade MD;  Location:  OR    GENITOURINARY SURGERY  1980's    shock wave lithotripsy    LASER HOLMIUM LITHOTRIPSY URETER(S), INSERT STENT, COMBINED Right 1/21/2022    Procedure: Cystoscopy, right ureteroscopy  "with holmium laser lithotripsy, right ureteral stent placement,;  Surgeon: Arnel Andrade MD;  Location: [facility] OR    ORTHOPEDIC SURGERY      Torn meniscus 2021       FAMILY HISTORY: No family history on file.    SOCIAL HISTORY:   Social History     Socioeconomic History    Marital status:    Tobacco Use    Smoking status: Never    Smokeless tobacco: Never   Substance and Sexual Activity    Alcohol use: Yes     Comment: 12-14 drinks per  week    Drug use: No    Sexual activity: Yes       Review Of Systems:  Skin: No rash, pruritis, or skin pigmentation  Eyes: No changes in vision  Ears/Nose/Throat: No changes in hearing, no nosebleeds  Respiratory: No shortness of breath, dyspnea on exertion, cough, or hemoptysis  Cardiovascular: No chest pain or palpitations  Gastrointestinal: No diarrhea or constipation. No abdominal pain. No hematochezia  Genitourinary: see HPI  Musculoskeletal: No pain or swelling of joints, normal range of motion  Neurologic: No weakness or tremors  Psychiatric: No recent changes in memory or mood  Hematologic/Lymphatic/Immunologic: No easy bruising or enlarged lymph nodes  Endocrine: No weight gain or loss      PHYSICAL EXAM:    /64   Pulse 80   Ht 1.803 m (5' 11\")   Wt 102.5 kg (226 lb)   BMI 31.52 kg/m      Constitutional: Well developed. Conversant and in no acute distress  Eyes: Anicteric sclera, conjunctiva clear, normal extraocular movements  ENT: Normocephalic and atraumatic,   Skin: Warm and dry. No rashes or lesions  Cardiac: No peripheral edema  Back/Flank: Not done  CNS/PNS: Normal musculature and movements, moves all extremities normally  Respiratory: Normal non-labored breathing  Abdomen: Soft nontender and nondistended  Peripheral Vascular: No peripheral edema  Mental Status/Psych: Alert and Oriented x 3. Normal mood and affect    Penis: Not done  Scrotal Skin: Not done  Testicles: Not done  Epididymis: Not done  Digital Rectal Exam:     Cystoscopy: Not " done    Imaging: None    Urinalysis: UA RESULTS:  Recent Labs   Lab Test 04/27/23  0824 11/02/21  1353 05/28/16  1424   COLOR Yellow   < > Light Yellow   APPEARANCE Clear   < > Clear   URINEGLC Negative   < > Negative   URINEBILI Negative   < > Negative   URINEKETONE Negative   < > Negative   SG 1.010   < > 1.003   UBLD Negative   < > Moderate*   URINEPH 5.5   < > 6.5   PROTEIN Negative   < > Negative   UROBILINOGEN 0.2   < >  --    NITRITE Negative   < > Negative   LEUKEST Trace*   < > Negative   RBCU  --   --  26*   WBCU  --   --  <1    < > = values in this interval not displayed.       PSA: Undetectable    Post Void Residual:     Other labs: None today      IMPRESSION:  Doing well, PSA undetectable    PLAN:  He is doing very well from a prostate cancer and kidney stone standpoint.  We again discussed prevention methods for kidney stones.  I will see him back in 6 months for his next PSA check.        Arnel Andrade M.D.

## 2023-10-26 NOTE — LETTER
10/26/2023       RE: Magdaleno Marie  [id number]  [place] [address]     Dear Colleague,    Thank you for referring your patient, Magdaleno Marie, to the [facility] at [facility]. Please see a copy of my visit note below.    Office Visit Note  [facility]  [phone]    UROLOGIC DIAGNOSES:   pT3b [place] 4+3 = 7 prostate cancer  Erectile dysfunction  History of stones  Mild phimosis    CURRENT INTERVENTIONS:   Robotic prostatectomy 2022  Viagra  Potassium citrate  Diprolene ointment    HISTORY:   Magdaleno returns to clinic today for follow-up on both prostate cancer and kidney stones.  He had a recent CT scan performed that showed no kidney stones on either side.  His PSA today remains undetectable.  He has very rare urinary leakage.  He only wears pads when he goes out for social events.      PAST MEDICAL HISTORY:   Past Medical History:   Diagnosis Date    Calculus of kidney     kidney stones    Hypercholesterolemia     Hypertension     Prostate cancer (H)        PAST SURGICAL HISTORY:   Past Surgical History:   Procedure Laterality Date    COLONOSCOPY      COMBINED CYSTOSCOPY, RETROGRADES, URETEROSCOPY, LASER HOLMIUM LITHOTRIPSY URETER(S), INSERT STENT Left 7/28/2016    Procedure: COMBINED CYSTOSCOPY, RETROGRADES, URETEROSCOPY, LASER HOLMIUM LITHOTRIPSY URETER(S), INSERT STENT;  Surgeon: Arnel Andrade MD;  Location:  OR    COMBINED CYSTOSCOPY, RETROGRADES, URETEROSCOPY, LASER HOLMIUM LITHOTRIPSY URETER(S), INSERT STENT Bilateral 9/9/2019    Procedure: Cystoscopy, bilateral retrograde pyelograms, interpretation of fluoroscopic images, bilateral ureteroscopy with holmium laser lithotripsy and stone basketing, placement of 5 x 26 double-J ureteral stents bilaterally;  Surgeon: Arnel Andrade MD;  Location: [facility] OR    CYSTOSCOPY      DAVINCI PROSTATECTOMY N/A 3/31/2022    Procedure:  "ROBOTIC ASSISTED LAPAROSCOPIC RADICAL PROSTATECTOMY,  BILATERAL PELVIC LYMPHADENECTOMY;  Surgeon: Arnel Andrade MD;  Location:  OR    GENITOURINARY SURGERY  1980's    shock wave lithotripsy    LASER HOLMIUM LITHOTRIPSY URETER(S), INSERT STENT, COMBINED Right 1/21/2022    Procedure: Cystoscopy, right ureteroscopy with holmium laser lithotripsy, right ureteral stent placement,;  Surgeon: Arnel Andrade MD;  Location:  OR    ORTHOPEDIC SURGERY      Torn meniscus 2021       FAMILY HISTORY: No family history on file.    SOCIAL HISTORY:   Social History     Socioeconomic History    Marital status:    Tobacco Use    Smoking status: Never    Smokeless tobacco: Never   Substance and Sexual Activity    Alcohol use: Yes     Comment: 12-14 drinks per  week    Drug use: No    Sexual activity: Yes       Review Of Systems:  Skin: No rash, pruritis, or skin pigmentation  Eyes: No changes in vision  Ears/Nose/Throat: No changes in hearing, no nosebleeds  Respiratory: No shortness of breath, dyspnea on exertion, cough, or hemoptysis  Cardiovascular: No chest pain or palpitations  Gastrointestinal: No diarrhea or constipation. No abdominal pain. No hematochezia  Genitourinary: see HPI  Musculoskeletal: No pain or swelling of joints, normal range of motion  Neurologic: No weakness or tremors  Psychiatric: No recent changes in memory or mood  Hematologic/Lymphatic/Immunologic: No easy bruising or enlarged lymph nodes  Endocrine: No weight gain or loss      PHYSICAL EXAM:    /64   Pulse 80   Ht 1.803 m (5' 11\")   Wt 102.5 kg (226 lb)   BMI 31.52 kg/m      Constitutional: Well developed. Conversant and in no acute distress  Eyes: Anicteric sclera, conjunctiva clear, normal extraocular movements  ENT: Normocephalic and atraumatic,   Skin: Warm and dry. No rashes or lesions  Cardiac: No peripheral edema  Back/Flank: Not done  CNS/PNS: Normal musculature and movements, moves all extremities " normally  Respiratory: Normal non-labored breathing  Abdomen: Soft nontender and nondistended  Peripheral Vascular: No peripheral edema  Mental Status/Psych: Alert and Oriented x 3. Normal mood and affect    Penis: Not done  Scrotal Skin: Not done  Testicles: Not done  Epididymis: Not done  Digital Rectal Exam:     Cystoscopy: Not done    Imaging: None    Urinalysis: UA RESULTS:  Recent Labs   Lab Test 04/27/23  0824 11/02/21  1353 05/28/16  1424   COLOR Yellow   < > Light Yellow   APPEARANCE Clear   < > Clear   URINEGLC Negative   < > Negative   URINEBILI Negative   < > Negative   URINEKETONE Negative   < > Negative   SG 1.010   < > 1.003   UBLD Negative   < > Moderate*   URINEPH 5.5   < > 6.5   PROTEIN Negative   < > Negative   UROBILINOGEN 0.2   < >  --    NITRITE Negative   < > Negative   LEUKEST Trace*   < > Negative   RBCU  --   --  26*   WBCU  --   --  <1    < > = values in this interval not displayed.       PSA: Undetectable    Post Void Residual:     Other labs: None today      IMPRESSION:  Doing well, PSA undetectable    PLAN:  He is doing very well from a prostate cancer and kidney stone standpoint.  We again discussed prevention methods for kidney stones.  I will see him back in 6 months for his next PSA check.        Arnel Andrade M.D.

## 2023-11-20 ENCOUNTER — APPOINTMENT (OUTPATIENT)
Dept: GENERAL RADIOLOGY | Facility: CLINIC | Age: 68
End: 2023-11-20
Attending: EMERGENCY MEDICINE
Payer: COMMERCIAL

## 2023-11-20 ENCOUNTER — HOSPITAL ENCOUNTER (EMERGENCY)
Facility: CLINIC | Age: 68
Discharge: HOME | End: 2023-11-20
Attending: EMERGENCY MEDICINE | Admitting: EMERGENCY MEDICINE
Payer: COMMERCIAL

## 2023-11-20 VITALS
DIASTOLIC BLOOD PRESSURE: 99 MMHG | HEART RATE: 67 BPM | OXYGEN SATURATION: 99 % | HEIGHT: 70 IN | WEIGHT: 230 LBS | BODY MASS INDEX: 32.93 KG/M2 | SYSTOLIC BLOOD PRESSURE: 165 MMHG | RESPIRATION RATE: 18 BRPM | TEMPERATURE: 97.7 F

## 2023-11-20 DIAGNOSIS — R07.9 CHEST PAIN, UNSPECIFIED TYPE: ICD-10-CM

## 2023-11-20 LAB
ALBUMIN SERPL BCG-MCNC: 4.8 G/DL (ref 3.5–5.2)
ALP SERPL-CCNC: 51 U/L (ref 40–150)
ALT SERPL W P-5'-P-CCNC: 31 U/L (ref 0–70)
ANION GAP SERPL CALCULATED.3IONS-SCNC: 10 MMOL/L (ref 7–15)
AST SERPL W P-5'-P-CCNC: 26 U/L (ref 0–45)
ATRIAL RATE - MUSE: 78 BPM
BASOPHILS # BLD AUTO: 0 10E3/UL (ref 0–0.2)
BASOPHILS NFR BLD AUTO: 1 %
BILIRUB SERPL-MCNC: 0.5 MG/DL
BUN SERPL-MCNC: 28.9 MG/DL (ref 8–23)
CALCIUM SERPL-MCNC: 9.3 MG/DL (ref 8.8–10.2)
CHLORIDE SERPL-SCNC: 103 MMOL/L (ref 98–107)
CREAT SERPL-MCNC: 1.17 MG/DL (ref 0.67–1.17)
DEPRECATED HCO3 PLAS-SCNC: 25 MMOL/L (ref 22–29)
DIASTOLIC BLOOD PRESSURE - MUSE: NORMAL MMHG
EGFRCR SERPLBLD CKD-EPI 2021: 67 ML/MIN/1.73M2
EOSINOPHIL # BLD AUTO: 0.2 10E3/UL (ref 0–0.7)
EOSINOPHIL NFR BLD AUTO: 3 %
ERYTHROCYTE [DISTWIDTH] IN BLOOD BY AUTOMATED COUNT: 12.4 % (ref 10–15)
GLUCOSE SERPL-MCNC: 99 MG/DL (ref 70–99)
HCT VFR BLD AUTO: 42.8 % (ref 40–53)
HGB BLD-MCNC: 14.4 G/DL (ref 13.3–17.7)
HOLD SPECIMEN: NORMAL
IMM GRANULOCYTES # BLD: 0 10E3/UL
IMM GRANULOCYTES NFR BLD: 1 %
INTERPRETATION ECG - MUSE: NORMAL
LIPASE SERPL-CCNC: 25 U/L (ref 13–60)
LYMPHOCYTES # BLD AUTO: 1.7 10E3/UL (ref 0.8–5.3)
LYMPHOCYTES NFR BLD AUTO: 23 %
MCH RBC QN AUTO: 30.6 PG (ref 26.5–33)
MCHC RBC AUTO-ENTMCNC: 33.6 G/DL (ref 31.5–36.5)
MCV RBC AUTO: 91 FL (ref 78–100)
MONOCYTES # BLD AUTO: 0.8 10E3/UL (ref 0–1.3)
MONOCYTES NFR BLD AUTO: 11 %
NEUTROPHILS # BLD AUTO: 4.5 10E3/UL (ref 1.6–8.3)
NEUTROPHILS NFR BLD AUTO: 61 %
NRBC # BLD AUTO: 0 10E3/UL
NRBC BLD AUTO-RTO: 0 /100
P AXIS - MUSE: 42 DEGREES
PLATELET # BLD AUTO: 151 10E3/UL (ref 150–450)
POTASSIUM SERPL-SCNC: 4.4 MMOL/L (ref 3.4–5.3)
PR INTERVAL - MUSE: 162 MS
PROT SERPL-MCNC: 7.4 G/DL (ref 6.4–8.3)
QRS DURATION - MUSE: 84 MS
QT - MUSE: 368 MS
QTC - MUSE: 419 MS
R AXIS - MUSE: 54 DEGREES
RBC # BLD AUTO: 4.71 10E6/UL (ref 4.4–5.9)
SODIUM SERPL-SCNC: 138 MMOL/L (ref 135–145)
SYSTOLIC BLOOD PRESSURE - MUSE: NORMAL MMHG
T AXIS - MUSE: 49 DEGREES
TROPONIN T SERPL HS-MCNC: 15 NG/L
TROPONIN T SERPL HS-MCNC: 16 NG/L
VENTRICULAR RATE- MUSE: 78 BPM
WBC # BLD AUTO: 7.2 10E3/UL (ref 4–11)

## 2023-11-20 PROCEDURE — 83690 ASSAY OF LIPASE: CPT | Performed by: EMERGENCY MEDICINE

## 2023-11-20 PROCEDURE — 84484 ASSAY OF TROPONIN QUANT: CPT | Performed by: EMERGENCY MEDICINE

## 2023-11-20 PROCEDURE — 36415 COLL VENOUS BLD VENIPUNCTURE: CPT | Performed by: EMERGENCY MEDICINE

## 2023-11-20 PROCEDURE — 71046 X-RAY EXAM CHEST 2 VIEWS: CPT

## 2023-11-20 PROCEDURE — 80053 COMPREHEN METABOLIC PANEL: CPT | Performed by: EMERGENCY MEDICINE

## 2023-11-20 PROCEDURE — 93005 ELECTROCARDIOGRAM TRACING: CPT

## 2023-11-20 PROCEDURE — 99285 EMERGENCY DEPT VISIT HI MDM: CPT | Mod: 25

## 2023-11-20 PROCEDURE — 85025 COMPLETE CBC W/AUTO DIFF WBC: CPT | Performed by: EMERGENCY MEDICINE

## 2023-11-20 ASSESSMENT — ACTIVITIES OF DAILY LIVING (ADL)
ADLS_ACUITY_SCORE: 35

## 2023-11-21 NOTE — ED NOTES
"Tele-PIT/Intake Evaluation      Video-Visit Details    Type of service:  Video Visit    Video Start Time (time video started): 6:30 PM  Video End Time (time video stopped): 6:34 PM   Originating Location (pt. Location): [facility]  Distant Location (provider location):  [facility]  Mode of Communication:  Video Conference via Vuze  Patient verbally consented to [employer] televisit.    History:  Patient with sharp left sided chest pain at 4:50pm and then reoccurred 10 minutes later.  Lasted seconds at a time.  Third episode about 15 min later.  No shortness of breath, no radiation of pain.  No history of CAD.  Non smoker.  Patient has HBP and takes lisinopril.  No diabetes or high cholesterol.  No recent travel.  No exertional component.  Walks and goes to gym regularly with no chest pain or exercise intolerance.  Patient played 18 holes of golf walking last week.  No fever or URI symptoms recently.      Exam:  General:  Alert, interactive  Cardiovascular:  Well perfused  Lungs:  No respiratory distress, no accessory muscle use  Neuro:  Moving all 4 extremities  Skin:  Warm, dry  Psych:  Normal affect    Patient Vitals for the past 24 hrs:   BP Temp Temp src Pulse Resp SpO2 Height Weight   11/20/23 1830 (!) 182/98 97.7  F (36.5  C) Temporal 89 20 98 % 1.778 m (5' 10\") 104.3 kg (230 lb)       Appropriate interventions for symptom management were initiated if applicable.  Appropriate diagnostic tests were initiated if indicated.    Important information for subsequent clinician:  Atypical sharp chest pain lasting seconds at a time several times today.  Will get chest xray, EKG and labs    I briefly evaluated the patient and developed an initial plan of care. I discussed this plan and explained that this brief interaction does not constitute a full evaluation. Patient/family understands that they should wait to be fully evaluated and discuss any test results with another clinician prior to " leaving the hospital.       Emily Villaseñor MD  11/20/23 6000

## 2023-11-21 NOTE — ED PROVIDER NOTES
History     Chief Complaint:  Chest Pain       HPI   Magdaleno Marie is a 68 year old male without significant medical problems presenting to us because of left-sided chest pain.  He had 3 distinct sharp stabbing events to the left anterior chest wall, each lasting mere seconds and very localized.  There were no other associated symptoms with it.  He has never experienced pain like this before and became concerned when he had one of them just before 5:00, another little after 5, and then one at around 520.  He has not had any further spells of chest discomfort after waiting in our emergency department waiting room for over 4 hours.  He notes that he exerts himself without any difficulty as he goes on walks and has not had any other symptoms recently.  He denies other concerns at this juncture.      Independent Historian:   None - Patient Only    Review of External Notes:   None       Medications:    acetaminophen (TYLENOL) 325 MG tablet  lisinopril (ZESTRIL) 20 MG tablet        Past Medical History:    Past Medical History:   Diagnosis Date    Calculus of kidney     Hypercholesterolemia     Hypertension     Prostate cancer (H)        Past Surgical History:    Past Surgical History:   Procedure Laterality Date    COLONOSCOPY      COMBINED CYSTOSCOPY, RETROGRADES, URETEROSCOPY, LASER HOLMIUM LITHOTRIPSY URETER(S), INSERT STENT Left 7/28/2016    Procedure: COMBINED CYSTOSCOPY, RETROGRADES, URETEROSCOPY, LASER HOLMIUM LITHOTRIPSY URETER(S), INSERT STENT;  Surgeon: Arnel Andrade MD;  Location:  OR    COMBINED CYSTOSCOPY, RETROGRADES, URETEROSCOPY, LASER HOLMIUM LITHOTRIPSY URETER(S), INSERT STENT Bilateral 9/9/2019    Procedure: Cystoscopy, bilateral retrograde pyelograms, interpretation of fluoroscopic images, bilateral ureteroscopy with holmium laser lithotripsy and stone basketing, placement of 5 x 26 double-J ureteral stents bilaterally;  Surgeon: Arnel Andrade MD;  Location:  OR     "CYSTOSCOPY      DAVINCI PROSTATECTOMY N/A 3/31/2022    Procedure: ROBOTIC ASSISTED LAPAROSCOPIC RADICAL PROSTATECTOMY,  BILATERAL PELVIC LYMPHADENECTOMY;  Surgeon: Arnel Andrade MD;  Location:  OR    GENITOURINARY SURGERY  1980's    shock wave lithotripsy    LASER HOLMIUM LITHOTRIPSY URETER(S), INSERT STENT, COMBINED Right 1/21/2022    Procedure: Cystoscopy, right ureteroscopy with holmium laser lithotripsy, right ureteral stent placement,;  Surgeon: Arnel Andrade MD;  Location:  OR    ORTHOPEDIC SURGERY      Torn meniscus 2021        Physical Exam   Patient Vitals for the past 24 hrs:   BP Temp Temp src Pulse Resp SpO2 Height Weight   11/20/23 2300 (!) 165/99 -- -- 67 18 99 % -- --   11/20/23 2252 (!) 133/95 -- -- 62 16 98 % -- --   11/20/23 2135 (!) 151/82 -- -- -- -- -- -- --   11/20/23 1830 (!) 182/98 97.7  F (36.5  C) Temporal 89 20 98 % 1.778 m (5' 10\") 104.3 kg (230 lb)        Physical Exam  Eye:  Pupils are equal, round, and reactive.  Extraocular movements intact.    ENT:  No rhinorrhea.  Moist mucus membranes.  Normal tongue and tonsil.    Cardiac:  Regular rate and rhythm.  No murmurs, gallops, or rubs.    Pulmonary:  Clear to auscultation bilaterally.  No wheezes, rales, or rhonchi.    Abdomen:  Positive bowel sounds.  Abdomen is soft and non-distended, without focal tenderness.    Musculoskeletal:  Normal movement of all extremities without evidence for deficit.    Skin:  Warm and dry without rashes.    Neurologic:  Non-focal exam without asymmetric weakness or numbness.     Psychiatric:  Normal affect with appropriate interaction with examiner.      Emergency Department Course   ECG  ECG taken at 1835, ECG read at 2255  Normal sinus rhythm.  Normal EKG.  Unchanged as compared to prior, dated September /9/2019.  Rate 78 bpm. IA interval 162 ms. QRS duration 84 ms. QT/QTc 368/419 ms. P-R-T axes 42 54 49.     Imaging:  XR Chest 2 Views   Final Result   IMPRESSION: No focal airspace " consolidation. No pleural effusion or pneumothorax.      Cardiomediastinal silhouette is normal.             Laboratory:  Labs Ordered and Resulted from Time of ED Arrival to Time of ED Departure   COMPREHENSIVE METABOLIC PANEL - Abnormal       Result Value    Sodium 138      Potassium 4.4      Carbon Dioxide (CO2) 25      Anion Gap 10      Urea Nitrogen 28.9 (*)     Creatinine 1.17      GFR Estimate 67      Calcium 9.3      Chloride 103      Glucose 99      Alkaline Phosphatase 51      AST 26      ALT 31      Protein Total 7.4      Albumin 4.8      Bilirubin Total 0.5     LIPASE - Normal    Lipase 25     TROPONIN T, HIGH SENSITIVITY - Normal    Troponin T, High Sensitivity 16     TROPONIN T, HIGH SENSITIVITY - Normal    Troponin T, High Sensitivity 15     CBC WITH PLATELETS AND DIFFERENTIAL    WBC Count 7.2      RBC Count 4.71      Hemoglobin 14.4      Hematocrit 42.8      MCV 91      MCH 30.6      MCHC 33.6      RDW 12.4      Platelet Count 151      % Neutrophils 61      % Lymphocytes 23      % Monocytes 11      % Eosinophils 3      % Basophils 1      % Immature Granulocytes 1      NRBCs per 100 WBC 0      Absolute Neutrophils 4.5      Absolute Lymphocytes 1.7      Absolute Monocytes 0.8      Absolute Eosinophils 0.2      Absolute Basophils 0.0      Absolute Immature Granulocytes 0.0      Absolute NRBCs 0.0            Emergency Department Course & Assessments:      Interventions:  Medications - No data to display       Independent Interpretation (X-rays, CTs, rhythm strip):  None    Consultations/Discussion of Management or Tests:  None        Social Determinants of Health affecting care:   None    Disposition:  The patient was discharged to home.     Impression & Plan    CMS Diagnoses: None    Medical Decision Making:  This healthy 68-year-old presents to us with a history that is minimally concerning for life-threatening etiologies.  He describes 3 very short lasting (1 or 2 seconds in length) of sharp pain in 1  spot over the left chest wall.  EKG is normal.  Troponin x2 is negative.  The remainder the labs are normal.  Chest x-ray is clear.  He has had no further symptoms over the last several hours.  With this, I favor more of a musculoskeletal or spastic type of etiology.  Esophageal spasm could also be present.  Nonetheless, I reassured him that this is not consistent with anything life-threatening he is safe for discharge home.  He will follow-up with his primary team if he has further symptoms.  He will return to us for any other emergent concerns.      Diagnosis:    ICD-10-CM    1. Chest pain, unspecified type  R07.9              Chad A. Trierweiler, MD  11/20/2023   Trierweiler, Chad A, MD Trierweiler, Chad A, MD  11/20/23 2327

## 2023-11-21 NOTE — ED TRIAGE NOTES
3 episodes of sharp chest pain since 5pm, currently no pain.      Triage Assessment (Adult)       Row Name 11/20/23 7858          Triage Assessment    Airway WDL WDL        Respiratory WDL    Respiratory WDL WDL        Cardiac WDL    Cardiac WDL chest pain        Cognitive/Neuro/Behavioral WDL    Cognitive/Neuro/Behavioral WDL WDL

## 2024-04-07 ENCOUNTER — HEALTH MAINTENANCE LETTER (OUTPATIENT)
Age: 69
End: 2024-04-07

## 2024-04-08 ENCOUNTER — TELEPHONE (OUTPATIENT)
Dept: UROLOGY | Facility: CLINIC | Age: 69
End: 2024-04-08
Payer: COMMERCIAL

## 2024-04-08 NOTE — TELEPHONE ENCOUNTER
Airway  Date/Time: 12/11/2023 8:18 PM  Urgency: elective      General Information and Staff    Patient location during procedure: OR  Anesthesiologist: Mary Conrad MD  Performed: anesthesiologist   Performed by: Mary Conrad MD  Authorized by: Mary Conrad MD      Indications and Patient Condition  Indications for airway management: anesthesia  Sedation level: deep  Preoxygenated: yes  Patient position: sniffing  Mask difficulty assessment: 0 - not attempted    Final Airway Details  Final airway type: supraglottic airway      Successful airway: classic  Size 4       Number of attempts at approach: 1
How Severe Are Your Spot(S)?: mild
[facility] Call Center    Phone Message    May a detailed message be left on voicemail: yes     Reason for Call: Other: Patient has an apt coming up on 4/25 with provider, he does not think he can make this apt- next opening I snot until August. Patient does not think he can wait that long, please call the patient back with other options. Per pt request      Action Taken: Message routed to:  Other: uro    Travel Screening: Not Applicable                                                                   
What Type Of Note Output Would You Prefer (Optional)?: Bullet Format
What Is The Reason For Today's Visit?: Full Body Skin Examination
What Is The Reason For Today's Visit? (Being Monitored For X): concerning skin lesions on a periodic basis

## 2024-05-14 ENCOUNTER — OFFICE VISIT (OUTPATIENT)
Dept: UROLOGY | Facility: CLINIC | Age: 69
End: 2024-05-14
Payer: COMMERCIAL

## 2024-05-14 VITALS
WEIGHT: 227 LBS | SYSTOLIC BLOOD PRESSURE: 128 MMHG | HEART RATE: 64 BPM | BODY MASS INDEX: 31.78 KG/M2 | DIASTOLIC BLOOD PRESSURE: 64 MMHG | HEIGHT: 71 IN

## 2024-05-14 DIAGNOSIS — C61 PROSTATE CANCER (H): ICD-10-CM

## 2024-05-14 DIAGNOSIS — N20.0 NEPHROLITHIASIS: ICD-10-CM

## 2024-05-14 DIAGNOSIS — N47.1 PHIMOSIS: Primary | ICD-10-CM

## 2024-05-14 PROBLEM — R73.9 ELEVATED BLOOD SUGAR: Status: ACTIVE | Noted: 2019-05-15

## 2024-05-14 PROBLEM — Z87.442 HISTORY OF KIDNEY STONES: Status: ACTIVE | Noted: 2017-06-22

## 2024-05-14 PROBLEM — N18.31 STAGE 3A CHRONIC KIDNEY DISEASE (H): Status: ACTIVE | Noted: 2024-01-24

## 2024-05-14 PROBLEM — E78.00 HYPERCHOLESTEREMIA: Status: ACTIVE | Noted: 2019-05-15

## 2024-05-14 LAB — PSA SERPL-MCNC: <0.04 UG/L (ref 0–4)

## 2024-05-14 PROCEDURE — 84153 ASSAY OF PSA TOTAL: CPT | Performed by: UROLOGY

## 2024-05-14 PROCEDURE — 99213 OFFICE O/P EST LOW 20 MIN: CPT | Performed by: UROLOGY

## 2024-05-14 PROCEDURE — 36415 COLL VENOUS BLD VENIPUNCTURE: CPT | Performed by: UROLOGY

## 2024-05-14 RX ORDER — BETAMETHASONE DIPROPIONATE 0.5 MG/G
OINTMENT, AUGMENTED TOPICAL 2 TIMES DAILY
Qty: 15 G | Refills: 5 | Status: SHIPPED | OUTPATIENT
Start: 2024-05-14

## 2024-05-14 ASSESSMENT — PAIN SCALES - GENERAL: PAINLEVEL: NO PAIN (0)

## 2024-05-14 NOTE — PROGRESS NOTES
Office Visit Note  [facility]  [phone]    UROLOGIC DIAGNOSES:   pT3b [place] 4+3 = 7 prostate cancer  Erectile dysfunction  History of stones  Mild phimosis    CURRENT INTERVENTIONS:   Robotic prostatectomy 2022  Viagra  Potassium citrate   Diprolene ointment      HISTORY:   Magdaleno returns clinic today for prostate cancer follow-up.  His PSA remains undetectable.  He continues to feel well with no urinary leakage.  His phimosis has not been a problem recently.      PAST MEDICAL HISTORY:   Past Medical History:   Diagnosis Date    Calculus of kidney     kidney stones    Hypercholesterolemia     Hypertension     Prostate cancer (H)        PAST SURGICAL HISTORY:   Past Surgical History:   Procedure Laterality Date    COLONOSCOPY      COMBINED CYSTOSCOPY, RETROGRADES, URETEROSCOPY, LASER HOLMIUM LITHOTRIPSY URETER(S), INSERT STENT Left 7/28/2016    Procedure: COMBINED CYSTOSCOPY, RETROGRADES, URETEROSCOPY, LASER HOLMIUM LITHOTRIPSY URETER(S), INSERT STENT;  Surgeon: Arnel Andrade MD;  Location:  OR    COMBINED CYSTOSCOPY, RETROGRADES, URETEROSCOPY, LASER HOLMIUM LITHOTRIPSY URETER(S), INSERT STENT Bilateral 9/9/2019    Procedure: Cystoscopy, bilateral retrograde pyelograms, interpretation of fluoroscopic images, bilateral ureteroscopy with holmium laser lithotripsy and stone basketing, placement of 5 x 26 double-J ureteral stents bilaterally;  Surgeon: Arnel Andrade MD;  Location:  OR    CYSTOSCOPY      DAVINCI PROSTATECTOMY N/A 3/31/2022    Procedure: ROBOTIC ASSISTED LAPAROSCOPIC RADICAL PROSTATECTOMY,  BILATERAL PELVIC LYMPHADENECTOMY;  Surgeon: Arnel Andrade MD;  Location:  OR    GENITOURINARY SURGERY  1980's    shock wave lithotripsy    LASER HOLMIUM LITHOTRIPSY URETER(S), INSERT STENT, COMBINED Right 1/21/2022    Procedure: Cystoscopy, right ureteroscopy with holmium laser lithotripsy, right ureteral stent placement,;  Surgeon: Arnel Andrade MD;   "Location: [facility] OR    ORTHOPEDIC SURGERY      Torn meniscus 2021       FAMILY HISTORY: No family history on file.    SOCIAL HISTORY:   Social History     Socioeconomic History    Marital status:    Tobacco Use    Smoking status: Never    Smokeless tobacco: Never   Substance and Sexual Activity    Alcohol use: Yes     Comment: 12-14 drinks per  week    Drug use: No    Sexual activity: Yes     Social Determinants of Health      [facility]    Financial Resource Strain    Received from [facility]    Social Connections       Review Of Systems:  Skin: No rash, pruritis, or skin pigmentation  Eyes: No changes in vision  Ears/Nose/Throat: No changes in hearing, no nosebleeds  Respiratory: No shortness of breath, dyspnea on exertion, cough, or hemoptysis  Cardiovascular: No chest pain or palpitations  Gastrointestinal: No diarrhea or constipation. No abdominal pain. No hematochezia  Genitourinary: see HPI  Musculoskeletal: No pain or swelling of joints, normal range of motion  Neurologic: No weakness or tremors  Psychiatric: No recent changes in memory or mood  Hematologic/Lymphatic/Immunologic: No easy bruising or enlarged lymph nodes  Endocrine: No weight gain or loss      PHYSICAL EXAM:    /64   Pulse 64   Ht 1.803 m (5' 11\")   Wt 103 kg (227 lb)   BMI 31.66 kg/m      Constitutional: Well developed. Conversant and in no acute distress  Eyes: Anicteric sclera, conjunctiva clear, normal extraocular movements  ENT: Normocephalic and atraumatic,   Skin: Warm and dry. No rashes or lesions  Cardiac: No peripheral edema  Back/Flank: Not done  CNS/PNS: Normal musculature and movements, moves all extremities normally  Respiratory: Normal non-labored breathing  Abdomen: Soft nontender and nondistended  Peripheral Vascular: No peripheral " edema  Mental Status/Psych: Alert and Oriented x 3. Normal mood and affect    Penis: Not done  Scrotal Skin: Not done  Testicles: Not done  Epididymis: Not done  Digital Rectal Exam:     Cystoscopy: Not done    Imaging: None    Urinalysis: UA RESULTS:  Recent Labs   Lab Test 04/27/23  0824 11/02/21  1353 05/28/16  1424   COLOR Yellow   < > Light Yellow   APPEARANCE Clear   < > Clear   URINEGLC Negative   < > Negative   URINEBILI Negative   < > Negative   URINEKETONE Negative   < > Negative   SG 1.010   < > 1.003   UBLD Negative   < > Moderate*   URINEPH 5.5   < > 6.5   PROTEIN Negative   < > Negative   UROBILINOGEN 0.2   < >  --    NITRITE Negative   < > Negative   LEUKEST Trace*   < > Negative   RBCU  --   --  26*   WBCU  --   --  <1    < > = values in this interval not displayed.       PSA: Undetectable    Post Void Residual:     Other labs: None today      IMPRESSION:  Doing well, PSA undetectable    PLAN:  He continues to do well from a prostate cancer standpoint.  He can move on to annual surveillance.  I will see him back in 1 year.        Arnel Andrade M.D.

## 2024-05-14 NOTE — LETTER
5/14/2024       RE: Magdaleno Marie  [id number]  [place] [address]     Dear Colleague,    Thank you for referring your patient, Magdaleno Marie, to the [facility] at [facility]. Please see a copy of my visit note below.    Office Visit Note  [facility]  [phone]    UROLOGIC DIAGNOSES:   pT3b [place] 4+3 = 7 prostate cancer  Erectile dysfunction  History of stones  Mild phimosis    CURRENT INTERVENTIONS:   Robotic prostatectomy 2022  Viagra  Potassium citrate   Diprolene ointment      HISTORY:   Magdaleno returns clinic today for prostate cancer follow-up.  His PSA remains undetectable.  He continues to feel well with no urinary leakage.  His phimosis has not been a problem recently.      PAST MEDICAL HISTORY:   Past Medical History:   Diagnosis Date    Calculus of kidney     kidney stones    Hypercholesterolemia     Hypertension     Prostate cancer (H)        PAST SURGICAL HISTORY:   Past Surgical History:   Procedure Laterality Date    COLONOSCOPY      COMBINED CYSTOSCOPY, RETROGRADES, URETEROSCOPY, LASER HOLMIUM LITHOTRIPSY URETER(S), INSERT STENT Left 7/28/2016    Procedure: COMBINED CYSTOSCOPY, RETROGRADES, URETEROSCOPY, LASER HOLMIUM LITHOTRIPSY URETER(S), INSERT STENT;  Surgeon: Arnel Andrade MD;  Location: [facility] OR    COMBINED CYSTOSCOPY, RETROGRADES, URETEROSCOPY, LASER HOLMIUM LITHOTRIPSY URETER(S), INSERT STENT Bilateral 9/9/2019    Procedure: Cystoscopy, bilateral retrograde pyelograms, interpretation of fluoroscopic images, bilateral ureteroscopy with holmium laser lithotripsy and stone basketing, placement of 5 x 26 double-J ureteral stents bilaterally;  Surgeon: Arnel Andrade MD;  Location: [facility] OR    CYSTOSCOPY      DAVINCI PROSTATECTOMY N/A 3/31/2022    Procedure: ROBOTIC ASSISTED LAPAROSCOPIC RADICAL PROSTATECTOMY,  BILATERAL PELVIC LYMPHADENECTOMY;  Surgeon: Raymond  "Arnel Kitchen MD;  Location:  OR    GENITOURINARY SURGERY  1980's    shock wave lithotripsy    LASER HOLMIUM LITHOTRIPSY URETER(S), INSERT STENT, COMBINED Right 1/21/2022    Procedure: Cystoscopy, right ureteroscopy with holmium laser lithotripsy, right ureteral stent placement,;  Surgeon: Arnel Andrade MD;  Location:  OR    ORTHOPEDIC SURGERY      Torn meniscus 2021       FAMILY HISTORY: No family history on file.    SOCIAL HISTORY:   Social History     Socioeconomic History    Marital status:    Tobacco Use    Smoking status: Never    Smokeless tobacco: Never   Substance and Sexual Activity    Alcohol use: Yes     Comment: 12-14 drinks per  week    Drug use: No    Sexual activity: Yes     Social Determinants of Health      Received from [email or web address], [employer] & [employer]    Financial Resource Strain    Received from [email or web address], [employer] & [employer]    Social Connections       Review Of Systems:  Skin: No rash, pruritis, or skin pigmentation  Eyes: No changes in vision  Ears/Nose/Throat: No changes in hearing, no nosebleeds  Respiratory: No shortness of breath, dyspnea on exertion, cough, or hemoptysis  Cardiovascular: No chest pain or palpitations  Gastrointestinal: No diarrhea or constipation. No abdominal pain. No hematochezia  Genitourinary: see HPI  Musculoskeletal: No pain or swelling of joints, normal range of motion  Neurologic: No weakness or tremors  Psychiatric: No recent changes in memory or mood  Hematologic/Lymphatic/Immunologic: No easy bruising or enlarged lymph nodes  Endocrine: No weight gain or loss      PHYSICAL EXAM:    /64   Pulse 64   Ht 1.803 m (5' 11\")   Wt 103 kg (227 lb)   BMI 31.66 kg/m      Constitutional: Well developed. Conversant and in no acute distress  Eyes: Anicteric sclera, conjunctiva clear, normal extraocular movements  ENT: " Normocephalic and atraumatic,   Skin: Warm and dry. No rashes or lesions  Cardiac: No peripheral edema  Back/Flank: Not done  CNS/PNS: Normal musculature and movements, moves all extremities normally  Respiratory: Normal non-labored breathing  Abdomen: Soft nontender and nondistended  Peripheral Vascular: No peripheral edema  Mental Status/Psych: Alert and Oriented x 3. Normal mood and affect    Penis: Not done  Scrotal Skin: Not done  Testicles: Not done  Epididymis: Not done  Digital Rectal Exam:     Cystoscopy: Not done    Imaging: None    Urinalysis: UA RESULTS:  Recent Labs   Lab Test 04/27/23  0824 11/02/21  1353 05/28/16  1424   COLOR Yellow   < > Light Yellow   APPEARANCE Clear   < > Clear   URINEGLC Negative   < > Negative   URINEBILI Negative   < > Negative   URINEKETONE Negative   < > Negative   SG 1.010   < > 1.003   UBLD Negative   < > Moderate*   URINEPH 5.5   < > 6.5   PROTEIN Negative   < > Negative   UROBILINOGEN 0.2   < >  --    NITRITE Negative   < > Negative   LEUKEST Trace*   < > Negative   RBCU  --   --  26*   WBCU  --   --  <1    < > = values in this interval not displayed.       PSA: Undetectable    Post Void Residual:     Other labs: None today      IMPRESSION:  Doing well, PSA undetectable    PLAN:  He continues to do well from a prostate cancer standpoint.  He can move on to annual surveillance.  I will see him back in 1 year.        Arnel Andrade M.D.

## 2024-05-14 NOTE — NURSING NOTE
Chief Complaint   Patient presents with    Prostate Cancer     Patient is here for PSA 6 month follow-up      Judy Barrow LPN

## 2025-03-01 ENCOUNTER — HEALTH MAINTENANCE LETTER (OUTPATIENT)
Age: 70
End: 2025-03-01

## 2025-04-22 ENCOUNTER — OFFICE VISIT (OUTPATIENT)
Dept: UROLOGY | Facility: CLINIC | Age: 70
End: 2025-04-22
Payer: COMMERCIAL

## 2025-04-22 VITALS
SYSTOLIC BLOOD PRESSURE: 152 MMHG | HEART RATE: 71 BPM | HEIGHT: 71 IN | DIASTOLIC BLOOD PRESSURE: 86 MMHG | BODY MASS INDEX: 30.8 KG/M2 | WEIGHT: 220 LBS

## 2025-04-22 DIAGNOSIS — N47.1 PHIMOSIS: ICD-10-CM

## 2025-04-22 DIAGNOSIS — C61 PROSTATE CANCER (H): Primary | ICD-10-CM

## 2025-04-22 DIAGNOSIS — N20.0 NEPHROLITHIASIS: ICD-10-CM

## 2025-04-22 LAB — PSA SERPL-MCNC: <0.04 UG/L (ref 0–4)

## 2025-04-22 PROCEDURE — 84153 ASSAY OF PSA TOTAL: CPT | Performed by: UROLOGY

## 2025-04-22 PROCEDURE — 36415 COLL VENOUS BLD VENIPUNCTURE: CPT | Performed by: UROLOGY

## 2025-04-22 ASSESSMENT — PAIN SCALES - GENERAL: PAINLEVEL_OUTOF10: NO PAIN (0)

## 2025-04-22 NOTE — LETTER
4/22/2025       RE: Magdaleno Marie  [id number]  [place] [address]     Dear Colleague,    Thank you for referring your patient, Magdaleno Marie, to the [facility] at [facility]. Please see a copy of my visit note below.    Office Visit Note  [facility]  [phone]    UROLOGIC DIAGNOSES:   pT3b [place] 4+3 = 7 prostate cancer  Erectile dysfunction  History of stones  Mild phimosis    CURRENT INTERVENTIONS:   Robotic prostatectomy 2022  Viagra  Potassium citrate  Diprolene ointment    HISTORY:   Magdaleno returns for prostate cancer follow-up.  His PSA remains undetectable and he continues to feel well.  He has had no flank pain because of kidney stones.  He does report that he has now developed phimosis and he is back to using the Diprolene ointment again      PAST MEDICAL HISTORY:   Past Medical History:   Diagnosis Date     Calculus of kidney     kidney stones     Hypercholesterolemia      Hypertension      Prostate cancer (H)        PAST SURGICAL HISTORY:   Past Surgical History:   Procedure Laterality Date     COLONOSCOPY       COMBINED CYSTOSCOPY, RETROGRADES, URETEROSCOPY, LASER HOLMIUM LITHOTRIPSY URETER(S), INSERT STENT Left 7/28/2016    Procedure: COMBINED CYSTOSCOPY, RETROGRADES, URETEROSCOPY, LASER HOLMIUM LITHOTRIPSY URETER(S), INSERT STENT;  Surgeon: Arnel Andrade MD;  Location:  OR     COMBINED CYSTOSCOPY, RETROGRADES, URETEROSCOPY, LASER HOLMIUM LITHOTRIPSY URETER(S), INSERT STENT Bilateral 9/9/2019    Procedure: Cystoscopy, bilateral retrograde pyelograms, interpretation of fluoroscopic images, bilateral ureteroscopy with holmium laser lithotripsy and stone basketing, placement of 5 x 26 double-J ureteral stents bilaterally;  Surgeon: Arnel Andrade MD;  Location:  OR     CYSTOSCOPY       DAVINCI PROSTATECTOMY N/A 3/31/2022    Procedure: ROBOTIC ASSISTED  LAPAROSCOPIC RADICAL PROSTATECTOMY,  BILATERAL PELVIC LYMPHADENECTOMY;  Surgeon: Arnel Andrade MD;  Location:  OR     GENITOURINARY SURGERY  1980's    shock wave lithotripsy     LASER HOLMIUM LITHOTRIPSY URETER(S), INSERT STENT, COMBINED Right 1/21/2022    Procedure: Cystoscopy, right ureteroscopy with holmium laser lithotripsy, right ureteral stent placement,;  Surgeon: Arnel Andrade MD;  Location:  OR     ORTHOPEDIC SURGERY      Torn meniscus 2021       FAMILY HISTORY: No family history on file.    SOCIAL HISTORY:   Social History     Socioeconomic History     Marital status:    Tobacco Use     Smoking status: Never     Smokeless tobacco: Never   Substance and Sexual Activity     Alcohol use: Yes     Comment: 12-14 drinks per  week     Drug use: No     Sexual activity: Yes     Social Drivers of Health      Received from [employer], [employer] & [facility]    Financial Resource Strain    Received from [employer], [employer] & [facility]    Social Connections       Review Of Systems:  Skin: No rash, pruritis, or skin pigmentation  Eyes: No changes in vision  Ears/Nose/Throat: No changes in hearing, no nosebleeds  Respiratory: No shortness of breath, dyspnea on exertion, cough, or hemoptysis  Cardiovascular: No chest pain or palpitations  Gastrointestinal: No diarrhea or constipation. No abdominal pain. No hematochezia  Genitourinary: see HPI  Musculoskeletal: No pain or swelling of joints, normal range of motion  Neurologic: No weakness or tremors  Psychiatric: No recent changes in memory or mood  Hematologic/Lymphatic/Immunologic: No easy bruising or enlarged lymph nodes  Endocrine: No weight gain or loss      PHYSICAL EXAM:    There were no vitals taken for this visit.    Constitutional: Well developed. Conversant and in no acute distress  Eyes: Anicteric sclera, conjunctiva  clear, normal extraocular movements  ENT: Normocephalic and atraumatic,   Skin: Warm and dry. No rashes or lesions  Cardiac: No peripheral edema  Back/Flank: Not done  CNS/PNS: Normal musculature and movements, moves all extremities normally  Respiratory: Normal non-labored breathing  Abdomen: Soft nontender and nondistended  Peripheral Vascular: No peripheral edema  Mental Status/Psych: Alert and Oriented x 3. Normal mood and affect    Penis: He now has a fairly tight phimosis present.  I cannot retract the foreskin at all.  Scrotal skin: Normal, no lesions  Testicles: Normal to palpation bilaterally  Epididymis: Normal to palpation bilaterally  Lymphatic: Normal inguinal lymph nodes,  Digital Rectal Exam:     Cystoscopy: Not done    Imaging: None    Urinalysis: UA RESULTS:  Recent Labs   Lab Test 04/27/23  0824   COLOR Yellow   APPEARANCE Clear   URINEGLC Negative   URINEBILI Negative   URINEKETONE Negative   SG 1.010   UBLD Negative   URINEPH 5.5   PROTEIN Negative   UROBILINOGEN 0.2   NITRITE Negative   LEUKEST Trace*       PSA: Undetectable    Post Void Residual:     Other labs: None today      IMPRESSION:  History of prostate cancer, kidney stones  Phimosis    PLAN:  He is doing well from both a prostate cancer and kidney stone standpoint.  He unfortunately developed a tight phimosis.  I counseled him that I feel it is likely he will require either a circumcision or dorsal slit procedure in order to fix the phimosis and we discussed both of these options.  He can continue to try the Diprolene ointment, but I counseled him that I do not think it will work and the phimosis is likely too tight to be fixed with ointment alone.  He would like to try the Diprolene ointment.  He will continue to apply it twice daily.    I will plan on seeing him back again in 1 year to follow-up on his various urologic issues.  Will repeat a PSA and a CT scan at that time.        Arnel Andrade M.D.              Again, thank you  for allowing me to participate in the care of your patient.      Sincerely,    Anrel Andrade MD

## 2025-04-22 NOTE — PROGRESS NOTES
Office Visit Note  [facility]  [phone]    UROLOGIC DIAGNOSES:   pT3b [place] 4+3 = 7 prostate cancer  Erectile dysfunction  History of stones  Mild phimosis    CURRENT INTERVENTIONS:   Robotic prostatectomy 2022  Viagra  Potassium citrate  Diprolene ointment    HISTORY:   Magdaleno returns for prostate cancer follow-up.  His PSA remains undetectable and he continues to feel well.  He has had no flank pain because of kidney stones.  He does report that he has now developed phimosis and he is back to using the Diprolene ointment again      PAST MEDICAL HISTORY:   Past Medical History:   Diagnosis Date    Calculus of kidney     kidney stones    Hypercholesterolemia     Hypertension     Prostate cancer (H)        PAST SURGICAL HISTORY:   Past Surgical History:   Procedure Laterality Date    COLONOSCOPY      COMBINED CYSTOSCOPY, RETROGRADES, URETEROSCOPY, LASER HOLMIUM LITHOTRIPSY URETER(S), INSERT STENT Left 7/28/2016    Procedure: COMBINED CYSTOSCOPY, RETROGRADES, URETEROSCOPY, LASER HOLMIUM LITHOTRIPSY URETER(S), INSERT STENT;  Surgeon: Arnel Andrade MD;  Location:  OR    COMBINED CYSTOSCOPY, RETROGRADES, URETEROSCOPY, LASER HOLMIUM LITHOTRIPSY URETER(S), INSERT STENT Bilateral 9/9/2019    Procedure: Cystoscopy, bilateral retrograde pyelograms, interpretation of fluoroscopic images, bilateral ureteroscopy with holmium laser lithotripsy and stone basketing, placement of 5 x 26 double-J ureteral stents bilaterally;  Surgeon: Arnel Andrade MD;  Location:  OR    CYSTOSCOPY      DAVINCI PROSTATECTOMY N/A 3/31/2022    Procedure: ROBOTIC ASSISTED LAPAROSCOPIC RADICAL PROSTATECTOMY,  BILATERAL PELVIC LYMPHADENECTOMY;  Surgeon: Arnel Andrade MD;  Location:  OR    GENITOURINARY SURGERY  1980's    shock wave lithotripsy    LASER HOLMIUM LITHOTRIPSY URETER(S), INSERT STENT, COMBINED Right 1/21/2022    Procedure: Cystoscopy, right ureteroscopy with holmium laser lithotripsy,  right ureteral stent placement,;  Surgeon: Arnel Andrade MD;  Location:  OR    ORTHOPEDIC SURGERY      Torn meniscus 2021       FAMILY HISTORY: No family history on file.    SOCIAL HISTORY:   Social History     Socioeconomic History    Marital status:    Tobacco Use    Smoking status: Never    Smokeless tobacco: Never   Substance and Sexual Activity    Alcohol use: Yes     Comment: 12-14 drinks per  week    Drug use: No    Sexual activity: Yes     Social Drivers of Health      Received from [employer]    Financial Resource Strain    Received from [employer]    Social Connections       Review Of Systems:  Skin: No rash, pruritis, or skin pigmentation  Eyes: No changes in vision  Ears/Nose/Throat: No changes in hearing, no nosebleeds  Respiratory: No shortness of breath, dyspnea on exertion, cough, or hemoptysis  Cardiovascular: No chest pain or palpitations  Gastrointestinal: No diarrhea or constipation. No abdominal pain. No hematochezia  Genitourinary: see HPI  Musculoskeletal: No pain or swelling of joints, normal range of motion  Neurologic: No weakness or tremors  Psychiatric: No recent changes in memory or mood  Hematologic/Lymphatic/Immunologic: No easy bruising or enlarged lymph nodes  Endocrine: No weight gain or loss      PHYSICAL EXAM:    There were no vitals taken for this visit.    Constitutional: Well developed. Conversant and in no acute distress  Eyes: Anicteric sclera, conjunctiva clear, normal extraocular movements  ENT: Normocephalic and atraumatic,   Skin: Warm and dry. No rashes or lesions  Cardiac: No peripheral edema  Back/Flank: Not done  CNS/PNS: Normal musculature and movements, moves all extremities normally  Respiratory: Normal non-labored breathing  Abdomen: Soft nontender and nondistended  Peripheral Vascular:  No peripheral edema  Mental Status/Psych: Alert and Oriented x 3. Normal mood and affect    Penis: He now has a fairly tight phimosis present.  I cannot retract the foreskin at all.  Scrotal skin: Normal, no lesions  Testicles: Normal to palpation bilaterally  Epididymis: Normal to palpation bilaterally  Lymphatic: Normal inguinal lymph nodes,  Digital Rectal Exam:     Cystoscopy: Not done    Imaging: None    Urinalysis: UA RESULTS:  Recent Labs   Lab Test 04/27/23  0824   COLOR Yellow   APPEARANCE Clear   URINEGLC Negative   URINEBILI Negative   URINEKETONE Negative   SG 1.010   UBLD Negative   URINEPH 5.5   PROTEIN Negative   UROBILINOGEN 0.2   NITRITE Negative   LEUKEST Trace*       PSA: Undetectable    Post Void Residual:     Other labs: None today      IMPRESSION:  History of prostate cancer, kidney stones  Phimosis    PLAN:  He is doing well from both a prostate cancer and kidney stone standpoint.  He unfortunately developed a tight phimosis.  I counseled him that I feel it is likely he will require either a circumcision or dorsal slit procedure in order to fix the phimosis and we discussed both of these options.  He can continue to try the Diprolene ointment, but I counseled him that I do not think it will work and the phimosis is likely too tight to be fixed with ointment alone.  He would like to try the Diprolene ointment.  He will continue to apply it twice daily.    I will plan on seeing him back again in 1 year to follow-up on his various urologic issues.  Will repeat a PSA and a CT scan at that time.        Arnel Andrade M.D.

## (undated) DEVICE — PAD CHUX UNDERPAD 23X24" 7136

## (undated) DEVICE — CATH URETERAL FLEX TIP TIGERTAIL 06FRX70CM 139006

## (undated) DEVICE — CATH FOLEY 20FR 5ML SILVER COAT LATEX 0165SI20

## (undated) DEVICE — PACK CYSTOSCOPY SBA15CYFSI

## (undated) DEVICE — DAVINCI XI DRAPE ARM 470015

## (undated) DEVICE — GLOVE PROTEXIS POWDER FREE SMT 7.5  2D72PT75X

## (undated) DEVICE — GLOVE PROTEXIS BLUE W/NEU-THERA 6.5  2D73EB65

## (undated) DEVICE — CLIP ENDO HEMO-LOC PURPLE LG 544240

## (undated) DEVICE — DAVINCI XI MONOPOLAR SCISSORS HOT SHEARS 8MM 470179

## (undated) DEVICE — SYR 10ML FINGER CONTROL W/O NDL 309695

## (undated) DEVICE — SOL WATER IRRIG 1000ML BOTTLE 2F7114

## (undated) DEVICE — DAVINCI XI OBTURATOR BLADELESS 8MM 470359

## (undated) DEVICE — GLOVE PROTEXIS W/NEU-THERA 7.5  2D73TE75

## (undated) DEVICE — GUIDEWIRE URO STR STIFF .035"X150CM NITINOL 150NSS35

## (undated) DEVICE — LINEN TOWEL PACK X5 5464

## (undated) DEVICE — KIT PATIENT POSITIONING PIGAZZI LATEX FREE 40580

## (undated) DEVICE — TUBING CONMED AIRSEAL SMOKE EVAC INSUFFLATION ASM-EVAC

## (undated) DEVICE — DRAIN JACKSON PRATT RESERVOIR 100ML SU130-1305

## (undated) DEVICE — PROTECTOR ARM ONE-STEP TRENDELENBURG 40418

## (undated) DEVICE — ENDO POUCH UNIV RETRIEVAL SYSTEM INZII 10MM CD001

## (undated) DEVICE — SCISSOR LAPAROSCOPIC EPIX 45CMX5MM CB040

## (undated) DEVICE — BASKET NITINOL TIPLESS HALO  1.5FRX120CM 554120

## (undated) DEVICE — DAVINCI XI SEAL UNIVERSAL 5-8MM 470361

## (undated) DEVICE — MANIFOLD NEPTUNE 4 PORT 700-20

## (undated) DEVICE — SU WND CLOSURE VLOC 90 ABS 3-0 VIOLET 6" CV-23 VLOCM0804

## (undated) DEVICE — SU PDS II 1 CT MONOFIL Z353H

## (undated) DEVICE — BLADE CLIPPER 4406

## (undated) DEVICE — BAG DRAIN URO FOR SIEMENS 8MM ADAPTER NS CC164NS-A

## (undated) DEVICE — SU MONOCRYL 4-0 PS-2 18" UND Y496G

## (undated) DEVICE — SOL WATER IRRIG 3000ML BAG 2B7117

## (undated) DEVICE — DAVINCI XI GRASPER ENDOWRIST PROGRASP 470093

## (undated) DEVICE — ESU GROUND PAD UNIVERSAL W/O CORD

## (undated) DEVICE — TUBING IRRIG TUR Y TYPE 96" LF 6543-01

## (undated) DEVICE — SU WND CLOSURE V-LOC 3-0 CV-23 6" VLOCM1904

## (undated) DEVICE — NDL INSUFFLATION 13GA 150MM C2202

## (undated) DEVICE — PACK CYSTO CUSTOM RIDGES

## (undated) DEVICE — SU SILK 2-0 FSL 18" 677G

## (undated) DEVICE — LINEN HALF SHEET 5512

## (undated) DEVICE — DAVINCI HOT SHEARS TIP COVER  400180

## (undated) DEVICE — RAD RX ISOVUE 300 (50ML) 61% IOPAMIDOL CHARGE PER ML

## (undated) DEVICE — SUCTION IRR STRYKERFLOW II W/TIP 250-070-520

## (undated) DEVICE — BAG CLEAR TRASH 1.3M 39X33" P4040C

## (undated) DEVICE — SHEATH URETERAL ACCESS NAVIGATOR 13/15FRX36CM 250-209

## (undated) DEVICE — SPONGE RAY-TEC 4X8" 7318

## (undated) DEVICE — ADH SKIN CLOSURE PREMIERPRO EXOFIN 1.0ML 3470

## (undated) DEVICE — DAVINCI XI DRAPE COLUMN 470341

## (undated) DEVICE — PREP TECHNI-CARE CHLOROXYLENOL 3% 4OZ BOTTLE C222-4ZWO

## (undated) DEVICE — DRAIN JACKSON PRATT 15FR ROUND SU130-1323

## (undated) DEVICE — LASER FIBER HOLMIUM FLEXIVA 200UM M0068403910 840-391

## (undated) DEVICE — SHEATH URETERAL ACCESS NAVIGATOR 11/13FRX36CM 250-203

## (undated) DEVICE — SOL NACL 0.9% INJ 1000ML BAG 2B1324X

## (undated) DEVICE — BASKET RETRIEVAL 1.9FRX120CM ESCAPE NTNL 4 WIRE 390-201

## (undated) DEVICE — SU WND CLOSURE VLOC 180 ABS 2-0 9" GS-21 VLOCL0345

## (undated) DEVICE — JELLY LUBRICATING SURGILUBE 2OZ TUBE

## (undated) DEVICE — DAVINCI XI NDL DRIVER LARGE 470006

## (undated) DEVICE — SHEATH URETERAL ACCESS NAVIGATOR 13/15FRX46CM M0062502100

## (undated) DEVICE — TROCAR AIRSEAL BLADELESS 12X120MM IAS12-120

## (undated) DEVICE — WIPES FOLEY CARE SURESTEP PROVON DFC100

## (undated) DEVICE — LINEN FULL SHEET 5511

## (undated) DEVICE — PACK DAVINCI UROLOGY SBA15UDFSG

## (undated) DEVICE — FLEXIVA 200

## (undated) DEVICE — COVER FOOTSWITCH W/CINCH 20X24" 923267

## (undated) DEVICE — CATH HOLDER STRAP 36600

## (undated) RX ORDER — HYDROMORPHONE HCL IN WATER/PF 6 MG/30 ML
PATIENT CONTROLLED ANALGESIA SYRINGE INTRAVENOUS
Status: DISPENSED
Start: 2022-03-31

## (undated) RX ORDER — FENTANYL CITRATE 50 UG/ML
INJECTION, SOLUTION INTRAMUSCULAR; INTRAVENOUS
Status: DISPENSED
Start: 2022-03-31

## (undated) RX ORDER — HYDROCODONE BITARTRATE AND ACETAMINOPHEN 5; 325 MG/1; MG/1
TABLET ORAL
Status: DISPENSED
Start: 2019-09-09

## (undated) RX ORDER — CEFAZOLIN SODIUM 2 G/100ML
INJECTION, SOLUTION INTRAVENOUS
Status: DISPENSED
Start: 2022-01-21

## (undated) RX ORDER — FENTANYL CITRATE 0.05 MG/ML
INJECTION, SOLUTION INTRAMUSCULAR; INTRAVENOUS
Status: DISPENSED
Start: 2022-01-21

## (undated) RX ORDER — MEPERIDINE HYDROCHLORIDE 25 MG/ML
INJECTION INTRAMUSCULAR; INTRAVENOUS; SUBCUTANEOUS
Status: DISPENSED
Start: 2022-01-21

## (undated) RX ORDER — FENTANYL CITRATE 50 UG/ML
INJECTION, SOLUTION INTRAMUSCULAR; INTRAVENOUS
Status: DISPENSED
Start: 2019-09-09

## (undated) RX ORDER — PHENYLEPHRINE HCL IN 0.9% NACL 1 MG/10 ML
SYRINGE (ML) INTRAVENOUS
Status: DISPENSED
Start: 2019-09-09

## (undated) RX ORDER — BUPIVACAINE HYDROCHLORIDE 2.5 MG/ML
INJECTION, SOLUTION EPIDURAL; INFILTRATION; INTRACAUDAL
Status: DISPENSED
Start: 2022-03-31

## (undated) RX ORDER — FENTANYL CITRATE 50 UG/ML
INJECTION, SOLUTION INTRAMUSCULAR; INTRAVENOUS
Status: DISPENSED
Start: 2022-01-21

## (undated) RX ORDER — CEFAZOLIN SODIUM/WATER 2 G/20 ML
SYRINGE (ML) INTRAVENOUS
Status: DISPENSED
Start: 2022-03-31

## (undated) RX ORDER — ATROPA BELLADONNA AND OPIUM 16.2; 3 MG/1; MG/1
SUPPOSITORY RECTAL
Status: DISPENSED
Start: 2022-01-21

## (undated) RX ORDER — HYDROMORPHONE HYDROCHLORIDE 1 MG/ML
INJECTION, SOLUTION INTRAMUSCULAR; INTRAVENOUS; SUBCUTANEOUS
Status: DISPENSED
Start: 2022-03-31

## (undated) RX ORDER — OXYCODONE HYDROCHLORIDE 5 MG/1
TABLET ORAL
Status: DISPENSED
Start: 2022-01-21

## (undated) RX ORDER — NEOSTIGMINE METHYLSULFATE 1 MG/ML
VIAL (ML) INJECTION
Status: DISPENSED
Start: 2022-03-31

## (undated) RX ORDER — CEFAZOLIN SODIUM 2 G/100ML
INJECTION, SOLUTION INTRAVENOUS
Status: DISPENSED
Start: 2019-09-09

## (undated) RX ORDER — PROPOFOL 10 MG/ML
INJECTION, EMULSION INTRAVENOUS
Status: DISPENSED
Start: 2022-01-21

## (undated) RX ORDER — HYDROMORPHONE HYDROCHLORIDE 1 MG/ML
INJECTION, SOLUTION INTRAMUSCULAR; INTRAVENOUS; SUBCUTANEOUS
Status: DISPENSED
Start: 2019-09-09

## (undated) RX ORDER — KETOROLAC TROMETHAMINE 30 MG/ML
INJECTION, SOLUTION INTRAMUSCULAR; INTRAVENOUS
Status: DISPENSED
Start: 2019-09-09

## (undated) RX ORDER — ONDANSETRON 2 MG/ML
INJECTION INTRAMUSCULAR; INTRAVENOUS
Status: DISPENSED
Start: 2019-09-09

## (undated) RX ORDER — ATROPA BELLADONNA AND OPIUM 16.2; 3 MG/1; MG/1
SUPPOSITORY RECTAL
Status: DISPENSED
Start: 2019-09-09

## (undated) RX ORDER — ACETAMINOPHEN 325 MG/1
TABLET ORAL
Status: DISPENSED
Start: 2022-01-21

## (undated) RX ORDER — ACETAMINOPHEN 325 MG/1
TABLET ORAL
Status: DISPENSED
Start: 2019-09-09

## (undated) RX ORDER — CEFAZOLIN SODIUM 1 G/3ML
INJECTION, POWDER, FOR SOLUTION INTRAMUSCULAR; INTRAVENOUS
Status: DISPENSED
Start: 2022-03-31

## (undated) RX ORDER — LIDOCAINE HYDROCHLORIDE 20 MG/ML
INJECTION, SOLUTION EPIDURAL; INFILTRATION; INTRACAUDAL; PERINEURAL
Status: DISPENSED
Start: 2022-01-21